# Patient Record
Sex: MALE | Race: WHITE | NOT HISPANIC OR LATINO | Employment: FULL TIME | URBAN - METROPOLITAN AREA
[De-identification: names, ages, dates, MRNs, and addresses within clinical notes are randomized per-mention and may not be internally consistent; named-entity substitution may affect disease eponyms.]

---

## 2018-07-29 ENCOUNTER — OFFICE VISIT (OUTPATIENT)
Dept: URGENT CARE | Facility: CLINIC | Age: 68
End: 2018-07-29
Payer: COMMERCIAL

## 2018-07-29 VITALS
HEIGHT: 66 IN | BODY MASS INDEX: 24.91 KG/M2 | WEIGHT: 155 LBS | HEART RATE: 69 BPM | OXYGEN SATURATION: 98 % | DIASTOLIC BLOOD PRESSURE: 97 MMHG | RESPIRATION RATE: 18 BRPM | TEMPERATURE: 97 F | SYSTOLIC BLOOD PRESSURE: 143 MMHG

## 2018-07-29 DIAGNOSIS — J40 BRONCHITIS: Primary | ICD-10-CM

## 2018-07-29 DIAGNOSIS — Z76.89 ESTABLISHING CARE WITH NEW DOCTOR, ENCOUNTER FOR: ICD-10-CM

## 2018-07-29 DIAGNOSIS — R05.9 COUGH: ICD-10-CM

## 2018-07-29 PROCEDURE — 99203 OFFICE O/P NEW LOW 30 MIN: CPT | Mod: 25,S$GLB,, | Performed by: NURSE PRACTITIONER

## 2018-07-29 PROCEDURE — 96372 THER/PROPH/DIAG INJ SC/IM: CPT | Mod: S$GLB,,, | Performed by: NURSE PRACTITIONER

## 2018-07-29 RX ORDER — PROMETHAZINE HYDROCHLORIDE AND DEXTROMETHORPHAN HYDROBROMIDE 6.25; 15 MG/5ML; MG/5ML
5 SYRUP ORAL NIGHTLY PRN
Qty: 100 ML | Refills: 0 | Status: SHIPPED | OUTPATIENT
Start: 2018-07-29 | End: 2018-08-03

## 2018-07-29 RX ORDER — BENZONATATE 200 MG/1
200 CAPSULE ORAL 3 TIMES DAILY PRN
Qty: 30 CAPSULE | Refills: 0 | Status: SHIPPED | OUTPATIENT
Start: 2018-07-29 | End: 2019-06-12

## 2018-07-29 RX ORDER — FINASTERIDE 1 MG/1
1 TABLET, FILM COATED ORAL DAILY
COMMUNITY
End: 2019-06-12

## 2018-07-29 RX ORDER — AZITHROMYCIN 250 MG/1
TABLET, FILM COATED ORAL
Qty: 6 TABLET | Refills: 0 | Status: SHIPPED | OUTPATIENT
Start: 2018-07-29 | End: 2019-06-12

## 2018-07-29 RX ORDER — ATORVASTATIN CALCIUM 10 MG/1
10 TABLET, FILM COATED ORAL DAILY
COMMUNITY
End: 2019-09-11 | Stop reason: SDUPTHER

## 2018-07-29 RX ORDER — BETAMETHASONE SODIUM PHOSPHATE AND BETAMETHASONE ACETATE 3; 3 MG/ML; MG/ML
6 INJECTION, SUSPENSION INTRA-ARTICULAR; INTRALESIONAL; INTRAMUSCULAR; SOFT TISSUE
Status: COMPLETED | OUTPATIENT
Start: 2018-07-29 | End: 2018-07-29

## 2018-07-29 RX ADMIN — BETAMETHASONE SODIUM PHOSPHATE AND BETAMETHASONE ACETATE 6 MG: 3; 3 INJECTION, SUSPENSION INTRA-ARTICULAR; INTRALESIONAL; INTRAMUSCULAR; SOFT TISSUE at 10:07

## 2018-07-29 NOTE — PATIENT INSTRUCTIONS
What Is Acute Bronchitis?  Acute bronchitis is when the airways in your lungs (bronchial tubes) become red and swollen (inflamed). It is usually caused by a viral infection. But it can also occur because of a bacteria or allergen. Symptoms include a cough that produces yellow or greenish mucus and can last for days or sometimes weeks.  Inside healthy lungs    Air travels in and out of the lungs through the airways. The linings of these airways produce sticky mucus. This mucus traps particles that enter the lungs. Tiny structures called cilia then sweep the particles out of the airways.     Healthy airway: Airways are normally open. Air moves in and out easily.      Healthy cilia: Tiny, hairlike cilia sweep mucus and particles up and out of the airways.   Lungs with bronchitis  Bronchitis often occurs with a cold or the flu virus. The airways become inflamed (red and swollen). There is a deep hacking cough from the extra mucus. Other symptoms may include:  · Wheezing  · Chest discomfort  · Shortness of breath  · Mild fever  A second infection, this time due to bacteria, may then occur. And airways irritated by allergens or smoke are more likely to get infected.        Inflamed airway: Inflammation and extra mucus narrow the airway, causing shortness of breath.      Impaired cilia: Extra mucus impairs cilia, causing congestion and wheezing. Smoking makes the problem worse.   Making a diagnosis  A physical exam, health history, and certain tests help your healthcare provider make the diagnosis.  Health history  Your healthcare provider will ask you about your symptoms.  The exam  Your provider listens to your chest for signs of congestion. He or she may also check your ears, nose, and throat.  Possible tests  · A sputum test for bacteria. This requires a sample of mucus from your lungs.  · A nasal or throat swab. This tests to see if you have a bacterial infection.  · A chest X-ray. This is done if your healthcare  provider thinks you have pneumonia.  · Tests to check for an underlying condition. Other tests may be done to check for things such as allergies, asthma, or COPD (chronic obstructive pulmonary disease). You may need to see a specialist for more lung function testing.  Treating a cough  The main treatment for bronchitis is easing symptoms. Avoiding smoke, allergens, and other things that trigger coughing can often help. If the infection is bacterial, you may be given antibiotics. During the illness, it's important to get plenty of sleep. To ease symptoms:  · Dont smoke. Also avoid secondhand smoke.  · Use a humidifier. Or try breathing in steam from a hot shower. This may help loosen mucus.  · Drink a lot of water and juice. They can soothe the throat and may help thin mucus.  · Sit up or use extra pillows when in bed. This helps to lessen coughing and congestion.  · Ask your provider about using medicine. Ask about using cough medicine, pain and fever medicine, or a decongestant.  Antibiotics  Most cases of bronchitis are caused by cold or flu viruses. They dont need antibiotics to treat them, even if your mucus is thick and green or yellow. Antibiotics dont treat viral illness and antibiotics have not been shown to have any benefit in cases of acute bronchitis. Taking antibiotics when they are not needed increases your risk of getting an infection later that is antibiotic-resistant. Antibiotics can also cause severe cases of diarrhea that require other antibiotics to treat.  It is important that you accept your healthcare provider's opinion to not use antibiotics. Your provider will prescribe antibiotics if the infection is caused by bacteria. If they are prescribed:  · Take all of the medicine. Take the medicine until it is used up, even if symptoms have improved. If you dont, the bronchitis may come back.  · Take the medicines as directed. For instance, some medicines should be taken with food.  · Ask about  side effects. Ask your provider or pharmacist what side effects are common, and what to do about them.  Follow-up care  You should see your provider again in 2 to 3 weeks. By this time, symptoms should have improved. An infection that lasts longer may mean you have a more serious problem.  Prevention  · Avoid tobacco smoke. If you smoke, quit. Stay away from smoky places. Ask friends and family not to smoke around you, or in your home or car.  · Get checked for allergies.  · Ask your provider about getting a yearly flu shot. Also ask about pneumococcal or pneumonia shots.  · Wash your hands often. This helps reduce the chance of picking up viruses that cause colds and flu.  Call your healthcare provider if:  · Symptoms worsen, or you have new symptoms  · Breathing problems worsen or  become severe  · Symptoms dont get better within a week, or within 3 days of taking antibiotics   Date Last Reviewed: 2/1/2017  © 6086-3832 [employer]. [address] [place] [address]. All rights reserved. This information is not intended as a substitute for professional medical care. Always follow your healthcare professional's instructions.    Please arrange follow up with your primary medical clinic as soon as possible. You must understand that you've received an Urgent Care treatment only and that you may be released before all of your medical problems are known or treated. You, the patient, will arrange for follow up as instructed. If your symptoms worsen or fail to improve you should go to the Emergency Room.

## 2018-07-29 NOTE — PROGRESS NOTES
"Subjective:       Patient ID: Sidney Guileln is a 68 y.o. male.    Vitals:  height is 5' 6" (1.676 m) and weight is 70.3 kg (155 lb). His tympanic temperature is 97.4 °F (36.3 °C). His blood pressure is 143/97 (abnormal) and his pulse is 69. His respiration is 18 and oxygen saturation is 98%.     Chief Complaint: Cough    Patient presents with complaint of cough x 2 weeks. Cough is productive. Patient having difficulty sleeping due to cough. Patient has different otc medicines but none have helped. Patient denies chills or fever. Patient communicates that he has had bronchitis in the past, never pneumonia. + SOB with exertion and coughing.       Cough   This is a new problem. The current episode started 1 to 4 weeks ago. The problem has been unchanged. The problem occurs every few minutes. The cough is productive of sputum. Pertinent negatives include no chest pain, chills, ear pain, eye redness, fever, headaches, hemoptysis, myalgias, nasal congestion, postnasal drip, rhinorrhea, sore throat, shortness of breath, sweats or wheezing. The symptoms are aggravated by lying down. He has tried OTC cough suppressant for the symptoms. The treatment provided no relief. His past medical history is significant for bronchitis. There is no history of asthma or pneumonia.     Review of Systems   Constitution: Positive for malaise/fatigue. Negative for chills and fever.   HENT: Positive for congestion and hoarse voice. Negative for ear pain, postnasal drip, rhinorrhea and sore throat.    Eyes: Negative for discharge and redness.   Cardiovascular: Negative for chest pain, dyspnea on exertion and leg swelling.   Respiratory: Positive for cough and sputum production. Negative for hemoptysis, shortness of breath and wheezing.    Musculoskeletal: Negative for myalgias.   Gastrointestinal: Negative for abdominal pain and nausea.   Neurological: Negative for headaches.   All other systems reviewed and are negative.      Objective:    "   Physical Exam   Constitutional: He is oriented to person, place, and time. He appears well-developed and well-nourished. He is cooperative.  Non-toxic appearance. He does not appear ill. No distress.   HENT:   Head: Normocephalic and atraumatic.   Right Ear: Hearing, tympanic membrane, external ear and ear canal normal.   Left Ear: Hearing, tympanic membrane, external ear and ear canal normal.   Nose: Nose normal. No mucosal edema, rhinorrhea or nasal deformity. No epistaxis. Right sinus exhibits no maxillary sinus tenderness and no frontal sinus tenderness. Left sinus exhibits no maxillary sinus tenderness and no frontal sinus tenderness.   Mouth/Throat: Uvula is midline, oropharynx is clear and moist and mucous membranes are normal. No trismus in the jaw. Normal dentition. No uvula swelling. No posterior oropharyngeal erythema.   Eyes: Conjunctivae and lids are normal. No scleral icterus.   Sclera clear bilat   Neck: Trachea normal, full passive range of motion without pain and phonation normal. Neck supple.   Cardiovascular: Normal rate, regular rhythm, normal heart sounds, intact distal pulses and normal pulses.    Pulmonary/Chest: Effort normal. No respiratory distress. He has rhonchi in the right lower field.   Persistent cough present throughout exam   Abdominal: Soft. Normal appearance and bowel sounds are normal. He exhibits no distension. There is no tenderness.   Musculoskeletal: Normal range of motion. He exhibits no edema or deformity.   Neurological: He is alert and oriented to person, place, and time. He exhibits normal muscle tone. Coordination normal.   Skin: Skin is warm, dry and intact. He is not diaphoretic. No pallor.   Psychiatric: He has a normal mood and affect. His speech is normal and behavior is normal. Judgment and thought content normal. Cognition and memory are normal.   Nursing note and vitals reviewed.    CXR IMPRESSION:  Impression       No significant abnormality seen.  Probable  bibasilar nipple shadows.  Apparent gynecomastia.       Assessment:       1. Bronchitis    2. Cough    3. Establishing care with new doctor, encounter for        Plan:         Bronchitis  -     azithromycin (Z-JAMESON) 250 MG tablet; Take 2 tablets by mouth on day 1; Take 1 tablet by mouth on days 2-5  Dispense: 6 tablet; Refill: 0  -     betamethasone acetate-betamethasone sodium phosphate injection 6 mg; Inject 1 mL (6 mg total) into the muscle one time.    Cough  -     X-Ray Chest PA And Lateral; Future; Expected date: 07/29/2018  -     benzonatate (TESSALON) 200 MG capsule; Take 1 capsule (200 mg total) by mouth 3 (three) times daily as needed.  Dispense: 30 capsule; Refill: 0  -     promethazine-dextromethorphan (PROMETHAZINE-DM) 6.25-15 mg/5 mL Syrp; Take 5 mLs by mouth nightly as needed.  Dispense: 100 mL; Refill: 0    Establishing care with new doctor, encounter for  -     Ambulatory referral to Internal Medicine

## 2018-08-01 ENCOUNTER — TELEPHONE (OUTPATIENT)
Dept: URGENT CARE | Facility: CLINIC | Age: 68
End: 2018-08-01

## 2019-06-12 ENCOUNTER — OFFICE VISIT (OUTPATIENT)
Dept: URGENT CARE | Facility: CLINIC | Age: 69
End: 2019-06-12
Payer: COMMERCIAL

## 2019-06-12 VITALS
WEIGHT: 158 LBS | HEIGHT: 66 IN | HEART RATE: 65 BPM | RESPIRATION RATE: 18 BRPM | TEMPERATURE: 97 F | DIASTOLIC BLOOD PRESSURE: 87 MMHG | SYSTOLIC BLOOD PRESSURE: 152 MMHG | OXYGEN SATURATION: 96 % | BODY MASS INDEX: 25.39 KG/M2

## 2019-06-12 DIAGNOSIS — Z76.89 ENCOUNTER TO ESTABLISH CARE: ICD-10-CM

## 2019-06-12 DIAGNOSIS — J98.01 BRONCHOSPASM: Primary | ICD-10-CM

## 2019-06-12 PROCEDURE — 99214 PR OFFICE/OUTPT VISIT, EST, LEVL IV, 30-39 MIN: ICD-10-PCS | Mod: S$GLB,,, | Performed by: FAMILY MEDICINE

## 2019-06-12 PROCEDURE — 99214 OFFICE O/P EST MOD 30 MIN: CPT | Mod: S$GLB,,, | Performed by: FAMILY MEDICINE

## 2019-06-12 PROCEDURE — 94640 PR INHAL RX, AIRWAY OBST/DX SPUTUM INDUCT: ICD-10-PCS | Mod: S$GLB,,, | Performed by: FAMILY MEDICINE

## 2019-06-12 PROCEDURE — 94640 AIRWAY INHALATION TREATMENT: CPT | Mod: S$GLB,,, | Performed by: FAMILY MEDICINE

## 2019-06-12 RX ORDER — CETIRIZINE HYDROCHLORIDE 10 MG/1
10 TABLET ORAL DAILY
COMMUNITY

## 2019-06-12 RX ORDER — IPRATROPIUM BROMIDE 0.5 MG/2.5ML
0.5 SOLUTION RESPIRATORY (INHALATION)
Status: COMPLETED | OUTPATIENT
Start: 2019-06-12 | End: 2019-06-12

## 2019-06-12 RX ORDER — ALBUTEROL SULFATE 0.83 MG/ML
2.5 SOLUTION RESPIRATORY (INHALATION)
Status: COMPLETED | OUTPATIENT
Start: 2019-06-12 | End: 2019-06-12

## 2019-06-12 RX ORDER — BENZONATATE 200 MG/1
200 CAPSULE ORAL 3 TIMES DAILY PRN
Qty: 30 CAPSULE | Refills: 1 | Status: SHIPPED | OUTPATIENT
Start: 2019-06-12 | End: 2019-06-22

## 2019-06-12 RX ORDER — ALBUTEROL SULFATE 90 UG/1
2 AEROSOL, METERED RESPIRATORY (INHALATION) EVERY 4 HOURS PRN
Qty: 18 G | Refills: 1 | Status: SHIPPED | OUTPATIENT
Start: 2019-06-12 | End: 2019-09-11

## 2019-06-12 RX ORDER — PREDNISONE 20 MG/1
40 TABLET ORAL DAILY
Qty: 10 TABLET | Refills: 0 | Status: SHIPPED | OUTPATIENT
Start: 2019-06-12 | End: 2019-06-17

## 2019-06-12 RX ADMIN — ALBUTEROL SULFATE 2.5 MG: 0.83 SOLUTION RESPIRATORY (INHALATION) at 09:06

## 2019-06-12 RX ADMIN — ALBUTEROL SULFATE 2.5 MG: 0.83 SOLUTION RESPIRATORY (INHALATION) at 10:06

## 2019-06-12 RX ADMIN — IPRATROPIUM BROMIDE 0.5 MG: 0.5 SOLUTION RESPIRATORY (INHALATION) at 10:06

## 2019-06-12 RX ADMIN — IPRATROPIUM BROMIDE 0.5 MG: 0.5 SOLUTION RESPIRATORY (INHALATION) at 09:06

## 2019-06-12 NOTE — PATIENT INSTRUCTIONS
Bronchospasm (Adult)    Bronchospasm occurs when the airways (bronchial tubes) go into spasm and contract. This makes it hard to breathe and causes wheezing (a high-pitched whistling sound). Bronchospasm can also cause frequent coughing without wheezing.  Bronchospasm is due to irritation, inflammation, or allergic reaction of the airways. People with asthma get bronchospasm. However, not everyone with bronchospasm has asthma.  Being exposed to harmful fumes, a recent case of bronchitis, exercise, or a flare-up of chronic obstructive pulmonary disease (COPD) may cause the airways to spasm. An episode of bronchospasm may last 7 to 14 days. Medicine may be prescribed to relax the airways and prevent wheezing. Antibiotics will be prescribed only if your healthcare provider thinks there is a bacterial infection. Antibiotics do not help a viral infection.  Home care  · Drink lots of water or other fluids (at least 10 glasses a day) during an attack. This will loosen lung secretions and make it easier to breathe. If you have heart or kidney disease, check with your doctor before you drink extra fluids.  · Take prescribed medicine exactly at the times advised. If you take an inhaled medicine to help with breathing, do not use it more than once every 4 hours, unless told to do so. If prescribed an antibiotic or prednisone, take all of the medicine, even if you are feeling better after a few days.  · Do not smoke. Also avoid being exposed to secondhand smoke.  · If you were given an inhaler, use it exactly as directed. If you need to use it more often than prescribed, your condition may be getting worse. Contact your healthcare provider.  Follow-up care  Follow up with your healthcare provider, or as advised.  Note: If you are age 65 or older, have a chronic lung disease or condition that affects your immune system, or you smoke, we recommend getting pneumococcal vaccinations, as well as an influenza vaccination (flu shot)  every autumn. Ask your healthcare provider about this.  When to seek medical advice  Call your healthcare provider right away if any of these occur:  · You need to use your inhalers more often than usual.  · You develop a fever of 100.4°F (38°C) or higher.  · You are coughing up lots of dark-colored sputum (mucus).  · You do not start to improve within 24 hours.  Call 911, or get immediate medical care  Contact emergency services if any of these occur:  · Coughing up bloody sputum (mucus)  · Chest pain with each breath  · Increased wheezing or shortness of breath  Date Last Reviewed: 9/13/2015 © 2000-2017 [employer]. [address] [place] [address], [place] [address]. All rights reserved. This information is not intended as a substitute for professional medical care. Always follow your healthcare professional's instructions.        Inhaler Use  The inhaler that you were prescribed contains a potent medicine. It should only be used as directed. The medicine in your inhaler must be breathed deeply into your lungs for it to work. It will not work at all if it only reaches your mouth and throat. Follow the instructions below for best results. And remember to follow your asthma action plan as given to you by your doctor.  1. Keep your inhaler at room temperature.  2. Hold the inhaler so that the part that goes into your mouth is at the bottom.  3. Shake the inhaler well and remove the cap.  4. Breathe out through your mouth to fully empty your lungs.  5. Place the inhaler in your mouth and close your lips tightly around it. (Or hold the inhaler 1 to 2 inches from your open mouth if told to do so by your healthcare provider.)  6. Squeeze the inhaler as you breathe in slowly through your mouth until your lungs are full of air, drawing the medicine deep into your lungs.  7. Hold your breath for 10 seconds, or as long as you can comfortably hold your breath. Then breathe out slowly.  8. If you have been advised to  take 2 puffs, wait 5 minutes, then repeat steps 3-7 above. Waiting 5 minutes between puffs will alow the medicine to open up your lungs so the second puff can get deeper into the lungs. Replace the cap when done.  9. If you were prescribed both a steroid inhaler and a bronchodilator inhaler, use the bronchodilator first to open the air passages. Wait 5 minutes, then use the steroid inhaler.  10. Rinse your mouth with water and spit it out (especially after using a steroid inhaler). This is very important if you are using a steroid inhaler to prevent thrush, a mild yeast infection of the mouth and back of the throat.  11. A special chamber (spacer) may be prescribed that attaches to your inhaler. This increases the amount of medicine that goes to your lungs. It also improves how well each treatment works. Ask your doctor about this if you did not receive one.    Keep it clean  Remove the metal canister and do not immerse it in water. Then clean the plastic mouthpiece, cap, and spacer if you have one, by rinsing them well in warm running water for 30 to 60 seconds. Shake off excess water and allow the mouthpiece to dry completely (overnight is recommended). This should be done once a week. If you need the inhaler before the mouthpiece is dry, shake off excess water, replace canister, and test spray 2 times (away from the face).  Warning  A steroid inhaler is used to prevent an asthma attack. Do not use this to treat an acute wheezing episode. Use only bronchodilator inhalers (quick relief) to treat an acute asthma attack.  If you find that your medicine is not working and you need to use it more often than prescribed, this could be a sign that your asthma is getting worse. Go to the emergency room or urgent care right away. An asthma attack is easiest to treat in the early stages before it becomes severe.  When to seek medical advice  Get prompt medical attention if any of the following occur:  · Increased wheezing  or shortness of breath  · Need to use your inhalers more often than usual without relief  · Fever of 100.4°F (38°C) or higher, or as directed by your healthcare provider  · Coughing up lots of dark-colored or bloody sputum (mucus)  · Chest pain with each breath  · Blue lips or fingernails  · Peak flow reading less than 50% of your normal best  Date Last Reviewed: 12/2/2015  © 8632-6054 [employer]. [address] [place] [address], [place] [address]. All rights reserved. This information is not intended as a substitute for professional medical care. Always follow your healthcare professional's instructions.      DO NOT HESITATE TO USE YOUR INHALER EVERY 4 HR AS NEEDED IF YOU ARE COUGHING OR WHEEZING.    Make sure that you follow up with your primary care doctor in the next 2-5 days if needed .  Return to the Urgent Care if signs or symptoms change and certainly if you have worsening symptoms go to the nearest emergency department for further evaluation.

## 2019-06-12 NOTE — PROGRESS NOTES
"Subjective:       Patient ID: Sidney Guillen is a 69 y.o. male.    Vitals:  height is 5' 6" (1.676 m) and weight is 71.7 kg (158 lb). His oral temperature is 97.4 °F (36.3 °C). His blood pressure is 152/87 (abnormal) and his pulse is 65. His respiration is 18 and oxygen saturation is 96%.     Chief Complaint: Cough    69-year-old male with cough for about 1 month.  Remote history of smoking, quit 25 years ago.  Cough is worse at night with some wheezing.  He has required steroid shots in the past. No new medications.  No fever.  No consistently colored sputum    Cough   The current episode started more than 1 month ago. The problem has been unchanged. The problem occurs constantly. The cough is productive of sputum. Associated symptoms include a fever and a sore throat. Pertinent negatives include no chills, ear pain, eye redness, hemoptysis, myalgias, rash, shortness of breath or wheezing. The symptoms are aggravated by lying down. He has tried OTC cough suppressant for the symptoms. The treatment provided no relief. His past medical history is significant for environmental allergies.       Constitution: Positive for fever. Negative for chills, sweating and fatigue.   HENT: Positive for congestion, sore throat, trouble swallowing and voice change. Negative for ear pain, sinus pain and sinus pressure.    Neck: Negative for painful lymph nodes.   Eyes: Negative for eye redness.   Respiratory: Positive for cough and sputum production. Negative for chest tightness, bloody sputum, COPD, shortness of breath, stridor, wheezing and asthma.    Gastrointestinal: Negative for nausea and vomiting.   Musculoskeletal: Negative for muscle ache.   Skin: Negative for rash.   Allergic/Immunologic: Positive for environmental allergies and seasonal allergies. Negative for asthma.   Hematologic/Lymphatic: Negative for swollen lymph nodes.       Objective:      Physical Exam   Constitutional: He is oriented to person, place, and time. " He appears well-developed and well-nourished. He is cooperative.  Non-toxic appearance. He does not appear ill. No distress.   HENT:   Head: Normocephalic and atraumatic.   Right Ear: Hearing, tympanic membrane, external ear and ear canal normal.   Left Ear: Hearing, tympanic membrane, external ear and ear canal normal.   Nose: Nose normal. No mucosal edema, rhinorrhea or nasal deformity. No epistaxis. Right sinus exhibits no maxillary sinus tenderness and no frontal sinus tenderness. Left sinus exhibits no maxillary sinus tenderness and no frontal sinus tenderness.   Mouth/Throat: Uvula is midline, oropharynx is clear and moist and mucous membranes are normal. No trismus in the jaw. Normal dentition. No uvula swelling. No oropharyngeal exudate or posterior oropharyngeal erythema.   Eyes: Conjunctivae and lids are normal. No scleral icterus.   Sclera clear bilat   Neck: Trachea normal, normal range of motion, full passive range of motion without pain and phonation normal. Neck supple.   Cardiovascular: Normal rate, regular rhythm, normal heart sounds, intact distal pulses and normal pulses.   No murmur heard.  Pulmonary/Chest: Effort normal. No stridor. No respiratory distress. He has wheezes. He has no rales.   End expiratory wheezing that improved after initial nebulizer treatment.  However he reported feeling about the same.  Nebulizer treatment was repeated.  Clear to auscultation bilaterally post treatment.  No rales.   Abdominal: Soft. Normal appearance and bowel sounds are normal. He exhibits no distension and no mass. There is no tenderness. There is no rebound and no guarding.   Musculoskeletal: Normal range of motion. He exhibits no edema or deformity.   Lymphadenopathy:     He has no cervical adenopathy.   Neurological: He is alert and oriented to person, place, and time. He exhibits normal muscle tone. Coordination normal.   Skin: Skin is warm, dry and intact. He is not diaphoretic. No pallor.    Psychiatric: He has a normal mood and affect. His speech is normal and behavior is normal. Judgment and thought content normal. Cognition and memory are normal.   Nursing note and vitals reviewed.      Assessment:       1. Bronchospasm        Plan:         Bronchospasm  -     albuterol nebulizer solution 2.5 mg  -     ipratropium 0.02 % nebulizer solution 0.5 mg    DO NOT HESITATE TO USE YOUR INHALER EVERY 4 HR AS NEEDED IF YOU ARE COUGHING OR WHEEZING.    Make sure that you follow up with your primary care doctor in the next 2-5 days if needed .  Return to the Urgent Care if signs or symptoms change and certainly if you have worsening symptoms go to the nearest emergency department for further evaluation.

## 2019-09-11 ENCOUNTER — HOSPITAL ENCOUNTER (OUTPATIENT)
Dept: RADIOLOGY | Facility: OTHER | Age: 69
Discharge: HOME | End: 2019-09-11
Attending: FAMILY MEDICINE
Payer: COMMERCIAL

## 2019-09-11 ENCOUNTER — OFFICE VISIT (OUTPATIENT)
Dept: INTERNAL MEDICINE | Facility: CLINIC | Age: 69
End: 2019-09-11
Payer: COMMERCIAL

## 2019-09-11 VITALS
SYSTOLIC BLOOD PRESSURE: 141 MMHG | HEIGHT: 66 IN | BODY MASS INDEX: 26.01 KG/M2 | OXYGEN SATURATION: 97 % | WEIGHT: 161.81 LBS | HEART RATE: 64 BPM | DIASTOLIC BLOOD PRESSURE: 84 MMHG

## 2019-09-11 DIAGNOSIS — I10 BENIGN HYPERTENSION: ICD-10-CM

## 2019-09-11 DIAGNOSIS — Z00.00 ANNUAL PHYSICAL EXAM: ICD-10-CM

## 2019-09-11 DIAGNOSIS — E78.2 MIXED HYPERLIPIDEMIA: ICD-10-CM

## 2019-09-11 DIAGNOSIS — Z11.59 ENCOUNTER FOR HEPATITIS C SCREENING TEST FOR LOW RISK PATIENT: ICD-10-CM

## 2019-09-11 DIAGNOSIS — E55.9 VITAMIN D DEFICIENCY: ICD-10-CM

## 2019-09-11 DIAGNOSIS — Z23 NEED FOR PNEUMOCOCCAL VACCINATION: ICD-10-CM

## 2019-09-11 DIAGNOSIS — Z13.6 ENCOUNTER FOR ABDOMINAL AORTIC ANEURYSM (AAA) SCREENING: ICD-10-CM

## 2019-09-11 DIAGNOSIS — Z00.00 ANNUAL PHYSICAL EXAM: Primary | ICD-10-CM

## 2019-09-11 DIAGNOSIS — I10 BENIGN HYPERTENSION: Primary | ICD-10-CM

## 2019-09-11 LAB — 25(OH)D3+25(OH)D2 SERPL-MCNC: 40 NG/ML (ref 30–96)

## 2019-09-11 PROCEDURE — 99214 OFFICE O/P EST MOD 30 MIN: CPT | Mod: PBBFAC,PN | Performed by: FAMILY MEDICINE

## 2019-09-11 PROCEDURE — 99999 PR PBB SHADOW E&M-EST. PATIENT-LVL IV: ICD-10-PCS | Mod: PBBFAC,,, | Performed by: FAMILY MEDICINE

## 2019-09-11 PROCEDURE — 90686 IIV4 VACC NO PRSV 0.5 ML IM: CPT | Mod: PBBFAC,PN

## 2019-09-11 PROCEDURE — 76706 US AAA SCREENING: ICD-10-PCS | Mod: 26,,, | Performed by: RADIOLOGY

## 2019-09-11 PROCEDURE — 99397 PER PM REEVAL EST PAT 65+ YR: CPT | Mod: S$PBB,,, | Performed by: FAMILY MEDICINE

## 2019-09-11 PROCEDURE — G0009 ADMIN PNEUMOCOCCAL VACCINE: HCPCS | Mod: PBBFAC,PN

## 2019-09-11 PROCEDURE — 82306 VITAMIN D 25 HYDROXY: CPT

## 2019-09-11 PROCEDURE — 76706 US ABDL AORTA SCREEN AAA: CPT | Mod: TC

## 2019-09-11 PROCEDURE — 99999 PR PBB SHADOW E&M-EST. PATIENT-LVL IV: CPT | Mod: PBBFAC,,, | Performed by: FAMILY MEDICINE

## 2019-09-11 PROCEDURE — 86803 HEPATITIS C AB TEST: CPT

## 2019-09-11 PROCEDURE — 76706 US ABDL AORTA SCREEN AAA: CPT | Mod: 26,,, | Performed by: RADIOLOGY

## 2019-09-11 PROCEDURE — 99397 PR PREVENTIVE VISIT,EST,65 & OVER: ICD-10-PCS | Mod: S$PBB,,, | Performed by: FAMILY MEDICINE

## 2019-09-11 RX ORDER — AMLODIPINE BESYLATE 10 MG/1
10 TABLET ORAL DAILY
Qty: 30 TABLET | Refills: 1 | Status: SHIPPED | OUTPATIENT
Start: 2019-09-11 | End: 2019-10-04 | Stop reason: SDUPTHER

## 2019-09-11 RX ORDER — ACETAMINOPHEN 500 MG
TABLET ORAL
COMMUNITY

## 2019-09-11 RX ORDER — AMLODIPINE BESYLATE 5 MG/1
5 TABLET ORAL DAILY
Qty: 90 TABLET | Refills: 3 | Status: SHIPPED | OUTPATIENT
Start: 2019-09-11 | End: 2019-09-11

## 2019-09-11 RX ORDER — ATORVASTATIN CALCIUM 10 MG/1
10 TABLET, FILM COATED ORAL DAILY
Qty: 90 TABLET | Refills: 3 | Status: SHIPPED | OUTPATIENT
Start: 2019-09-11 | End: 2020-06-11 | Stop reason: SDUPTHER

## 2019-09-11 NOTE — TELEPHONE ENCOUNTER
I see that this patient's repeat systolic blood pressure was still >140.  Can you please call the patient to offer the following options:    1. We can increase the dose of his amlodipine to bring his pressures more comfortably below the goal of 140/90    2. Or to recommend a follow-up visit in 2-4 weeks to repeat his reading, discuss home blood pressures, and possible treatment options.    Thanks!

## 2019-09-11 NOTE — PROGRESS NOTES
After obtaining consent, and per orders of Dr. Batres, injection of fluarix Lot [id number] 6/30/20 given in the RD by SOFI ALATORRE. Patient tolerated well and band aid applied. Patient instructed to remain in clinic for 15 minutes afterwards, and to report any adverse reaction to me immediately.    After obtaining consent, and per orders of Dr. Batres, injection of pneumonia 13 Lot [id number] 9/20 given in the LD by SOFI ALATORRE. Patient tolerated well and band aid applied. Patient instructed to remain in clinic for 15 minutes afterwards, and to report any adverse reaction to me immediately.

## 2019-09-11 NOTE — TELEPHONE ENCOUNTER
Sounds good, new Rx sent, can you please call his [facility] to cancel the initial Rx for amlodipine 5 mg and clarify his dosing, thank you!

## 2019-09-11 NOTE — TELEPHONE ENCOUNTER
Spoke with sofy at [facility] and informed her that Dr Batres sent in the new script for Amlodipine 10mg today and she cancelled out the Amlodipine 5mg and pt is to be taking it once daily. Pt was informed as well.

## 2019-09-11 NOTE — TELEPHONE ENCOUNTER
Pt states that she would like to try a higher dose of the amlodipine and come back to see Dr. Batres on 10/10/19 to make sure that the medication is working.

## 2019-09-11 NOTE — PATIENT INSTRUCTIONS
Prevention Guidelines, Men Ages 65 and Older  Screening tests and vaccines are an important part of managing your health. Health counseling is essential, too. Below are guidelines for these, for men ages 65 and older. Talk with your healthcare provider to make sure youre up-to-date on what you need.  Screening Who needs it How often   Abdominal aortic aneurysm Men ages 65 to 75 who have ever smoked 1 ultrasound   Alcohol misuse All men in this age group At routine exams   Blood pressure All men in this age group Every 2 years if your blood pressure is less than 120/80 mm Hg; yearly if your systolic blood pressure is 120 to 139 mm Hg, or your diastolic blood pressure reading is 80 to 89 mm Hg   Colorectal cancer All men in this age group Flexible sigmoidoscopy every 5 years, or colonoscopy every 10 years, or double-contrast barium enema every 5 years; yearly fecal occult blood test or fecal immunochemical test; or a stool DNA test as often as your healthcare provider advises; talk with your healthcare provider about which tests are best for you and when you no longer need colonoscopies (generally after age 75)   Depression All men in this age group At routine exams   Type 2 diabetes or prediabetes All adults beginning at age 45 and adults without symptoms at any age who are overweight or obese and have 1 or more other risk factors for diabetes At least every 3 years (yearly if your blood sugar has already begun to rise)   Hepatitis C Men at increased risk for infection - talk with your healthcare provider At routine exams   High cholesterol or triglycerides All men in this age group At least every 5 years   HIV Men at increased risk for infection - talk with your healthcare provider At routine exams   Lung cancer Adults ages 55 to 80 who have smoked Yearly screening in smokers with 30 pack-year history of smoking or who quit within 15 years   Obesity All men in this age group At routine exams   Prostate cancer All  men in this age group, talk to healthcare provider about risks and benefits of digital rectal exam (LASHAWN) and prostate-specific antigen (PSA) screening1 At routine exams   Syphilis Men at increased risk for infection - talk with your healthcare provider At routine exams   Tuberculosis Men at increased risk for infection - talk with your healthcare provider Ask your healthcare provider   Vision All men in this age group Every 1 to 2 years; if you have a chronic health condition, ask your healthcare provider if you needs exams more often   Vaccine Who needs it How often   Chickenpox (varicella) All men in this age group who have no record of this infection or vaccine 2 doses; second dose should be given at least 4 weeks after the first dose   Hepatitis A Men at increased risk for infection - talk with your healthcare provider 2 doses given at least 6 months apart   Hepatitis B Men at increased risk for infection - talk with your healthcare provider 3 doses over 6 months; second dose should be given 1 month after the first dose; the third dose should be given at least 2 months after the second dose and at least 4 months after the first dose   Haemophilus influenzae Type B (HIB) Men at increased risk for infection - talk with your healthcare provider 1 to 3 doses   Influenza (flu) All men in this age group  Once a year   Meningococcal Men at increased risk for infection - talk with your healthcare provider 1 or more doses   Pneumococcal conjugate vaccine (PCV13) and pneumococcal polysaccharide vaccine (PPSV23) All men in this age group 1 dose of each vaccine   Tetanus/diphtheria/  pertussis (Td/Tdap) booster All men in this age group Td every 10 years, or Tdap if you will have contact with a child younger than 12 months old   Zoster All men in this age group 1 dose   Counseling Who needs it How often   Diet and exercise Men who are overweight or obese When diagnosed, and then at routine exams   Fall prevention (exercise,  vitamin D supplements) All men in this age group At routine exams   Sexually transmitted infection Men at increased risk for infection - talk with your healthcare provider At routine exams   Use of daily aspirin Men ages 45 to 79 at risk for cardiovascular health problems At routine exams   Use of tobacco and the health effects it can cause All men in this age group Every visit   [address] [place] [address] [facility]   Date Last Reviewed: 2/1/2017  © 7354-1015 [employer]. [address] [place] [address], [place] [address]. All rights reserved. This information is not intended as a substitute for professional medical care. Always follow your healthcare professional's instructions.

## 2019-09-12 ENCOUNTER — TELEPHONE (OUTPATIENT)
Dept: INTERNAL MEDICINE | Facility: CLINIC | Age: 69
End: 2019-09-12

## 2019-09-12 LAB — HCV AB SERPL QL IA: NEGATIVE

## 2019-09-12 NOTE — TELEPHONE ENCOUNTER
"docplannerhart message sent.  Please let the patient know their results, thank you:    " Hi Mr. Guillen,    I have reviewed your recent results.  Your labs are looking good.  The screening for hepatitis is negative, and your vitamin D level is now safely in the normal range.  It would be safe to continue the current dose of vitamin D supplement, but if you prefer to reduce the dosing I would recommend still taking 1683-4354 units per day.  The ultrasound to screen for "AAA" (abdominal aortic aneurysm) was not quite normal, but also not worrisome.  The top of the aorta is measuring at 3.6-3.7 cm, which is slightly above the cutoff of normal at 3.5 cm.  For perspective, we typically don't worry about this measurement unless it is above 4.5-5.5 cm.  At this point, no further workup would be needed, but we could consider repeating this ultrasound in another 1-2 years to make sure everything is stable.  Please contact the office if you have any additional questions or concerns.    Daniel Batres MD      "

## 2019-10-04 DIAGNOSIS — I10 BENIGN HYPERTENSION: ICD-10-CM

## 2019-10-04 RX ORDER — AMLODIPINE BESYLATE 10 MG/1
TABLET ORAL
Qty: 90 TABLET | Refills: 3 | Status: SHIPPED | OUTPATIENT
Start: 2019-10-04 | End: 2020-03-11 | Stop reason: SDUPTHER

## 2019-10-14 ENCOUNTER — OFFICE VISIT (OUTPATIENT)
Dept: INTERNAL MEDICINE | Facility: CLINIC | Age: 69
End: 2019-10-14
Payer: MEDICARE

## 2019-10-14 VITALS
TEMPERATURE: 98 F | HEART RATE: 67 BPM | WEIGHT: 165.25 LBS | SYSTOLIC BLOOD PRESSURE: 142 MMHG | HEIGHT: 66 IN | BODY MASS INDEX: 26.56 KG/M2 | OXYGEN SATURATION: 98 % | DIASTOLIC BLOOD PRESSURE: 80 MMHG

## 2019-10-14 DIAGNOSIS — J30.9 CHRONIC ALLERGIC RHINITIS: ICD-10-CM

## 2019-10-14 DIAGNOSIS — I10 BENIGN HYPERTENSION: Primary | ICD-10-CM

## 2019-10-14 PROCEDURE — 99999 PR PBB SHADOW E&M-EST. PATIENT-LVL IV: CPT | Mod: PBBFAC,,, | Performed by: FAMILY MEDICINE

## 2019-10-14 PROCEDURE — 99999 PR PBB SHADOW E&M-EST. PATIENT-LVL IV: ICD-10-PCS | Mod: PBBFAC,,, | Performed by: FAMILY MEDICINE

## 2019-10-14 PROCEDURE — 99214 OFFICE O/P EST MOD 30 MIN: CPT | Mod: PBBFAC,PN | Performed by: FAMILY MEDICINE

## 2019-10-14 RX ORDER — FLUTICASONE PROPIONATE 50 MCG
2 SPRAY, SUSPENSION (ML) NASAL DAILY
Qty: 16 G | Refills: 2 | Status: SHIPPED | OUTPATIENT
Start: 2019-10-14 | End: 2019-12-30 | Stop reason: SDUPTHER

## 2019-10-14 RX ORDER — LOSARTAN POTASSIUM 25 MG/1
25 TABLET ORAL DAILY
Qty: 30 TABLET | Refills: 1 | Status: SHIPPED | OUTPATIENT
Start: 2019-10-14 | End: 2019-11-12 | Stop reason: SDUPTHER

## 2019-10-14 NOTE — PATIENT INSTRUCTIONS
Losartan tablets  What is this medicine?  LOSARTAN (efren NOELLE tan) is used to treat high blood pressure and to reduce the risk of stroke in certain patients. This drug also slows the progression of kidney disease in patients with diabetes.  How should I use this medicine?  Take this medicine by mouth with a glass of water. Follow the directions on the prescription label. This medicine can be taken with or without food. Take your doses at regular intervals. Do not take your medicine more often than directed.  Talk to your pediatrician regarding the use of this medicine in children. Special care may be needed.  What side effects may I notice from receiving this medicine?  Side effects that you should report to your doctor or health care professional as soon as possible:  · confusion, dizziness, light headedness or fainting spells  · decreased amount of urine passed  · difficulty breathing or swallowing, hoarseness, or tightening of the throat  · fast or irregular heart beat, palpitations, or chest pain  · skin rash, itching  · swelling of your face, lips, tongue, hands, or feet  Side effects that usually do not require medical attention (report to your doctor or health care professional if they continue or are bothersome):  · cough  · decreased sexual function or desire  · headache  · nasal congestion or stuffiness  · nausea or stomach pain  · sore or cramping muscles  What may interact with this medicine?  · blood pressure medicines  · diuretics, especially triamterene, spironolactone, or amiloride  · fluconazole  · NSAIDs, medicines for pain and inflammation, like ibuprofen or naproxen  · potassium salts or potassium supplements  · rifampin  What if I miss a dose?  If you miss a dose, take it as soon as you can. If it is almost time for your next dose, take only that dose. Do not take double or extra doses.  Where should I keep my medicine?  Keep out of the reach of children.  Store at room temperature between 15  and 30 degrees C (59 and 86 degrees F). Protect from light. Keep container tightly closed. Throw away any unused medicine after the expiration date.  What should I tell my health care provider before I take this medicine?  They need to know if you have any of these conditions:  · heart failure  · kidney or liver disease  · an unusual or allergic reaction to losartan, other medicines, foods, dyes, or preservatives  · pregnant or trying to get pregnant  · breast-feeding  What should I watch for while using this medicine?  Visit your doctor or health care professional for regular checks on your progress. Check your blood pressure as directed. Ask your doctor or health care professional what your blood pressure should be and when you should contact him or her. Call your doctor or health care professional if you notice an irregular or fast heart beat.  Women should inform their doctor if they wish to become pregnant or think they might be pregnant. There is a potential for serious side effects to an unborn child, particularly in the second or third trimester. Talk to your health care professional or pharmacist for more information.  You may get drowsy or dizzy. Do not drive, use machinery, or do anything that needs mental alertness until you know how this drug affects you. Do not stand or sit up quickly, especially if you are an older patient. This reduces the risk of dizzy or fainting spells. Alcohol can make you more drowsy and dizzy. Avoid alcoholic drinks.  Avoid salt substitutes unless you are told otherwise by your doctor or health care professional.  Do not treat yourself for coughs, colds, or pain while you are taking this medicine without asking your doctor or health care professional for advice. Some ingredients may increase your blood pressure.  NOTE:This sheet is a summary. It may not cover all possible information. If you have questions about this medicine, talk to your doctor, pharmacist, or health care  provider. Copyright© 2017 [employer]

## 2019-10-14 NOTE — PROGRESS NOTES
"[facility]  [facility] - Family Medicine/ Internal Medicine  Clinic Note      Subjective:       Patient ID: Sidney Guillen is a 69 y.o. male.    Chief Complaint: blood pressure follow up    Hypertension   This is a chronic problem. The problem has been gradually worsening since onset. The problem is uncontrolled (mildly elevated blood pressure at last visit and today). Pertinent negatives include no anxiety, chest pain, headaches, malaise/fatigue, palpitations, peripheral edema or shortness of breath. There are no associated agents to hypertension. There are no known risk factors for coronary artery disease. Past treatments include angiotensin blockers. The current treatment provides significant improvement. There are no compliance problems.  There is no history of kidney disease, CAD/MI or CVA.     Review of Systems   Constitutional: Negative for fatigue, fever and malaise/fatigue.   Respiratory: Negative for cough and shortness of breath.    Cardiovascular: Negative for chest pain and palpitations.   Gastrointestinal: Negative for diarrhea and vomiting.   Neurological: Negative for dizziness, light-headedness and headaches.   Psychiatric/Behavioral: Negative for dysphoric mood and sleep disturbance. The patient is not nervous/anxious.        Objective:      BP (!) 142/80 (BP Location: Right arm, Patient Position: Sitting, BP Method: Medium (Manual))   Pulse 67   Temp 98.1 °F (36.7 °C)   Ht 5' 6" (1.676 m)   Wt 75 kg (165 lb 3.8 oz)   SpO2 98%   BMI 26.67 kg/m²   Physical Exam   Constitutional: He is oriented to person, place, and time. He appears well-developed and well-nourished. No distress.   Neck: Normal range of motion.   Cardiovascular: Normal rate and regular rhythm.   No murmur heard.  Pulmonary/Chest: Effort normal and breath sounds normal. He has no wheezes. He has no rales.   Abdominal: Soft. Bowel sounds are normal. He exhibits no distension. There is no tenderness. "   Musculoskeletal: Normal range of motion.   Neurological: He is alert and oriented to person, place, and time.   Skin: Skin is warm and dry. No rash noted.   Psychiatric: He has a normal mood and affect.   Vitals reviewed.      Assessment:       1. Benign hypertension    2. Chronic allergic rhinitis        Plan:     1. Benign hypertension  - chronic condition, is not well-controlled, with slightly elevated pressures here, will increase current regimen by adding ARB to CCB, dosing instructions and potential side effects reviewed, handout provided   - - losartan (COZAAR) 25 MG tablet; Take 1 tablet (25 mg total) by mouth once daily.  Dispense: 30 tablet; Refill: 1  - diet and exercise lifestyle modifications reviewed and recommended     2. Chronic allergic rhinitis  - fluticasone propionate (FLONASE) 50 mcg/actuation nasal spray; 2 sprays (100 mcg total) by Each Nostril route once daily.  Dispense: 16 g; Refill: 2     - Follow up in about 4 weeks (around 11/11/2019) for follow-up blood pressure.     Daniel Batres MD  10/14/2019          Medication List with Changes/Refills   New Medications    FLUTICASONE PROPIONATE (FLONASE) 50 MCG/ACTUATION NASAL SPRAY    2 sprays (100 mcg total) by Each Nostril route once daily.    LOSARTAN (COZAAR) 25 MG TABLET    Take 1 tablet (25 mg total) by mouth once daily.   Current Medications    AMLODIPINE (NORVASC) 10 MG TABLET    TAKE 1 TABLET BY MOUTH EVERY DAY    ATORVASTATIN (LIPITOR) 10 MG TABLET    Take 1 tablet (10 mg total) by mouth once daily.    CETIRIZINE (ZYRTEC) 10 MG TABLET    Take 10 mg by mouth once daily.    CHOLECALCIFEROL, VITAMIN D3, (VITAMIN D3) 2,000 UNIT CAP        DICLOFENAC POTASSIUM ORAL    Take 75 mg by mouth as needed.

## 2019-11-12 ENCOUNTER — OFFICE VISIT (OUTPATIENT)
Dept: INTERNAL MEDICINE | Facility: CLINIC | Age: 69
End: 2019-11-12
Payer: MEDICARE

## 2019-11-12 VITALS
SYSTOLIC BLOOD PRESSURE: 132 MMHG | WEIGHT: 166.81 LBS | DIASTOLIC BLOOD PRESSURE: 70 MMHG | HEIGHT: 66 IN | HEART RATE: 59 BPM | OXYGEN SATURATION: 95 % | BODY MASS INDEX: 26.81 KG/M2 | TEMPERATURE: 97 F

## 2019-11-12 DIAGNOSIS — I10 BENIGN HYPERTENSION: ICD-10-CM

## 2019-11-12 PROCEDURE — 99214 OFFICE O/P EST MOD 30 MIN: CPT | Mod: PBBFAC,PN | Performed by: FAMILY MEDICINE

## 2019-11-12 PROCEDURE — 99999 PR PBB SHADOW E&M-EST. PATIENT-LVL IV: CPT | Mod: PBBFAC,,, | Performed by: FAMILY MEDICINE

## 2019-11-12 PROCEDURE — 99999 PR PBB SHADOW E&M-EST. PATIENT-LVL IV: ICD-10-PCS | Mod: PBBFAC,,, | Performed by: FAMILY MEDICINE

## 2019-11-12 RX ORDER — LOSARTAN POTASSIUM 25 MG/1
25 TABLET ORAL DAILY
Qty: 90 TABLET | Refills: 3 | Status: SHIPPED | OUTPATIENT
Start: 2019-11-12 | End: 2019-12-02 | Stop reason: SDUPTHER

## 2019-11-12 NOTE — PATIENT INSTRUCTIONS
Discharge Instructions for Low Blood Pressure (Hypotension)  You have been diagnosed with hypotension. When you have hypotension, your blood pressure is lower than normal. Low blood pressure can make you feel dizzy or faint. This condition is sometimes a side effect of taking certain medicines, including medicines for high blood pressure (hypertension). It can also result from medical conditions such as dehydration.  Home care  These home care steps can help manage your condition:  · Follow your doctors instructions.  · Rest in bed and ask for help with daily activities until you feel better. You may need to slowly increase the amount of time you spend sitting or doing light activity.  · Dont drive while your blood pressure is not controlled.  · Be careful when you get up from sitting or lying down.  ¨ Take your time. Sudden movements can cause dizziness or fainting.  ¨ When you first sit up after lying down, be sure to sit in bed for 30 seconds or so before getting up to walk.  · Tell your doctor about the medicines you are taking. Many kinds of medicines trigger low blood pressure.  · Limit your alcohol intake to no more than 2 drinks a day for men and 1 drink a day for women. Alcohol can dehydrate you even further. It can also interfere with the effectiveness of medicines.  · Prevent dehydration by drinking plenty of fluids, unless otherwise instructed by your doctor  · Learn to take your own blood pressure. Keep a record of your results. Ask your doctor which readings mean that you need medical attention.  · Tell your family members to call an ambulance if you become unconscious. Request that they learn CPR.  Follow-up care  Make a follow-up appointment as directed by our staff.     When to seek medical care  Call your doctor immediately if you have any of the following:  · Chest pain or shortness of breath (call 911)  · Dizziness or fainting spells  · Black, maroon, or tarry stools  · Irregular  heartbeat  · Stiff neck  · Severe upper back pain  · Diarrhea or vomiting that doesnt go away  · Inability to eat or drink  · Burning sensation when you urinate  · Urine with a strong, unpleasant odor  · Fainting with exercise   Date Last Reviewed: 4/27/2016  © 5832-6749 "[employer]. [address] [place] [address], [place] [address]. All rights reserved. This information is not intended as a substitute for professional medical care. Always follow your healthcare professional's instructions.

## 2019-11-12 NOTE — PROGRESS NOTES
"[facility]  [facility] - Family Medicine/ Internal Medicine  Clinic Note      Subjective:       Patient ID: Sidney Guillen is a 69 y.o. male.    Chief Complaint: Benign Hypertension    Hypertension   This is a chronic problem. The problem has been rapidly improving since onset. The problem is controlled. Pertinent negatives include no anxiety, chest pain, headaches, malaise/fatigue, palpitations, peripheral edema or shortness of breath. There are no associated agents to hypertension. There are no known risk factors for coronary artery disease. Past treatments include angiotensin blockers and calcium channel blockers. The current treatment provides significant improvement. There are no compliance problems.  There is no history of kidney disease, CAD/MI or CVA.     Review of Systems   Constitutional: Negative for fatigue, fever and malaise/fatigue.   Respiratory: Negative for cough and shortness of breath.    Cardiovascular: Negative for chest pain and palpitations.   Gastrointestinal: Negative for diarrhea and vomiting.   Neurological: Negative for dizziness, light-headedness and headaches.   Psychiatric/Behavioral: Negative for dysphoric mood and sleep disturbance. The patient is not nervous/anxious.        Objective:      /70 (BP Location: Left arm, Patient Position: Sitting, BP Method: Medium (Manual))   Pulse (!) 59   Temp 97.4 °F (36.3 °C) (Oral)   Ht 5' 6" (1.676 m)   Wt 75.7 kg (166 lb 12.8 oz)   SpO2 95%   BMI 26.92 kg/m²   Physical Exam   Constitutional: He is oriented to person, place, and time. He appears well-developed and well-nourished. No distress.   Neck: Normal range of motion.   Cardiovascular: Normal rate and regular rhythm.   No murmur heard.  Pulmonary/Chest: Effort normal and breath sounds normal. He has no wheezes. He has no rales.   Abdominal: Soft. Bowel sounds are normal. He exhibits no distension. There is no tenderness.   Musculoskeletal: Normal range of motion. "   Neurological: He is alert and oriented to person, place, and time.   Skin: Skin is warm and dry. No rash noted.   Psychiatric: He has a normal mood and affect.   Vitals reviewed.      Assessment:       1. Benign hypertension        Plan:     1. Benign hypertension  - chronic condition, now well-controlled with amlodipine and low-dose ARB, will continue current regimen and refill provided   - - losartan (COZAAR) 25 MG tablet; Take 1 tablet (25 mg total) by mouth once daily.  Dispense: 90 tablet; Refill: 3  - diet and exercise lifestyle modifications reviewed and recommended  - goal BP reviewed, have encouraged patient to follow blood pressures at home intermittently and call the office for any concerns  - questions answered, warning signs reviewed, patient is comfortable with this plan and was encouraged to call the office for any concerns or worsening of condition     - Follow up in about 3 months (around 2/12/2020) for follow-up BP.     Daniel Batres MD  11/12/2019          Medication List with Changes/Refills   Current Medications    AMLODIPINE (NORVASC) 10 MG TABLET    TAKE 1 TABLET BY MOUTH EVERY DAY    ATORVASTATIN (LIPITOR) 10 MG TABLET    Take 1 tablet (10 mg total) by mouth once daily.    CETIRIZINE (ZYRTEC) 10 MG TABLET    Take 10 mg by mouth once daily.    CHOLECALCIFEROL, VITAMIN D3, (VITAMIN D3) 2,000 UNIT CAP        DICLOFENAC POTASSIUM ORAL    Take 75 mg by mouth as needed.     FLUTICASONE PROPIONATE (FLONASE) 50 MCG/ACTUATION NASAL SPRAY    2 sprays (100 mcg total) by Each Nostril route once daily.   Changed and/or Refilled Medications    Modified Medication Previous Medication    LOSARTAN (COZAAR) 25 MG TABLET losartan (COZAAR) 25 MG tablet       Take 1 tablet (25 mg total) by mouth once daily.    Take 1 tablet (25 mg total) by mouth once daily.

## 2019-12-02 DIAGNOSIS — I10 BENIGN HYPERTENSION: ICD-10-CM

## 2019-12-02 RX ORDER — LOSARTAN POTASSIUM 25 MG/1
25 TABLET ORAL DAILY
Qty: 90 TABLET | Refills: 3 | Status: SHIPPED | OUTPATIENT
Start: 2019-12-02 | End: 2020-03-11 | Stop reason: SDUPTHER

## 2019-12-30 DIAGNOSIS — J30.9 CHRONIC ALLERGIC RHINITIS: ICD-10-CM

## 2019-12-30 RX ORDER — FLUTICASONE PROPIONATE 50 MCG
2 SPRAY, SUSPENSION (ML) NASAL DAILY
Qty: 16 G | Refills: 2 | Status: SHIPPED | OUTPATIENT
Start: 2019-12-30 | End: 2020-01-06

## 2020-01-06 DIAGNOSIS — J30.9 CHRONIC ALLERGIC RHINITIS: ICD-10-CM

## 2020-01-06 RX ORDER — FLUTICASONE PROPIONATE 50 MCG
2 SPRAY, SUSPENSION (ML) NASAL DAILY
Qty: 48 ML | Refills: 2 | Status: SHIPPED | OUTPATIENT
Start: 2020-01-06 | End: 2020-03-17 | Stop reason: SDUPTHER

## 2020-01-22 ENCOUNTER — PATIENT MESSAGE (OUTPATIENT)
Dept: INTERNAL MEDICINE | Facility: CLINIC | Age: 70
End: 2020-01-22

## 2020-01-22 NOTE — TELEPHONE ENCOUNTER
Please recommend for the patient to make an appointment in clinic to discuss what's been going on and what further testing might be indicated.  We can arrange for testing and/or specialty referral at that time, and discuss potential treatment options as well.  Thanks.

## 2020-01-27 ENCOUNTER — OFFICE VISIT (OUTPATIENT)
Dept: INTERNAL MEDICINE | Facility: CLINIC | Age: 70
End: 2020-01-27
Payer: COMMERCIAL

## 2020-01-27 VITALS
HEART RATE: 69 BPM | HEIGHT: 66 IN | DIASTOLIC BLOOD PRESSURE: 80 MMHG | OXYGEN SATURATION: 96 % | WEIGHT: 164.44 LBS | SYSTOLIC BLOOD PRESSURE: 128 MMHG | BODY MASS INDEX: 26.43 KG/M2

## 2020-01-27 DIAGNOSIS — M25.562 CHRONIC PAIN OF BOTH KNEES: Primary | ICD-10-CM

## 2020-01-27 DIAGNOSIS — G89.29 CHRONIC PAIN OF BOTH KNEES: Primary | ICD-10-CM

## 2020-01-27 DIAGNOSIS — M25.561 CHRONIC PAIN OF BOTH KNEES: Primary | ICD-10-CM

## 2020-01-27 DIAGNOSIS — Z87.828 HISTORY OF TORN MENISCUS OF LEFT KNEE: ICD-10-CM

## 2020-01-27 PROCEDURE — 99213 OFFICE O/P EST LOW 20 MIN: CPT | Mod: S$GLB,,, | Performed by: FAMILY MEDICINE

## 2020-01-27 PROCEDURE — 1125F AMNT PAIN NOTED PAIN PRSNT: CPT | Mod: S$GLB,,, | Performed by: FAMILY MEDICINE

## 2020-01-27 PROCEDURE — 99999 PR PBB SHADOW E&M-EST. PATIENT-LVL V: ICD-10-PCS | Mod: PBBFAC,,, | Performed by: FAMILY MEDICINE

## 2020-01-27 PROCEDURE — 99999 PR PBB SHADOW E&M-EST. PATIENT-LVL V: CPT | Mod: PBBFAC,,, | Performed by: FAMILY MEDICINE

## 2020-01-27 PROCEDURE — 1125F PR PAIN SEVERITY QUANTIFIED, PAIN PRESENT: ICD-10-PCS | Mod: S$GLB,,, | Performed by: FAMILY MEDICINE

## 2020-01-27 PROCEDURE — 99215 OFFICE O/P EST HI 40 MIN: CPT | Mod: PBBFAC,PN | Performed by: FAMILY MEDICINE

## 2020-01-27 PROCEDURE — 1159F PR MEDICATION LIST DOCUMENTED IN MEDICAL RECORD: ICD-10-PCS | Mod: S$GLB,,, | Performed by: FAMILY MEDICINE

## 2020-01-27 PROCEDURE — 1159F MED LIST DOCD IN RCRD: CPT | Mod: S$GLB,,, | Performed by: FAMILY MEDICINE

## 2020-01-27 PROCEDURE — 99213 PR OFFICE/OUTPT VISIT, EST, LEVL III, 20-29 MIN: ICD-10-PCS | Mod: S$GLB,,, | Performed by: FAMILY MEDICINE

## 2020-01-27 NOTE — PATIENT INSTRUCTIONS
Knee Pain of Uncertain Cause    There are several common causes for knee pain. These can include:  · A sprain of the ligaments that support the joint  · An injury to the cartilage lining of the joint  · Arthritis from wear-and-tear or inflammation  There are other causes as well. There may also be swelling, reduced movement of the knee joint, and pain with walking. A definite diagnosis will still need to be made. If your symptoms do not improve, further follow-up and testing may be needed.  Home care  · Stay off the injured leg as much as possible until pain improves.  · Apply an ice pack over the injured area for 15 to 20 minutes every 3 to 6 hours. You should do this for the first 24 to 48 hours. You can make an ice pack by filling a plastic bag that seals at the top with ice cubes and then wrapping it with a thin towel. Continue to use ice packs for relief of pain and swelling as needed. As the ice melts, be careful to avoid getting your wrap, splint, or cast wet. After 48 hours, apply heat (warm shower or warm bath) for 15 to 20 minutes several times a day, or alternate ice and heat. If you have to wear a hook-and-loop knee brace, you can open it to apply the ice pack, or heat, directly to the knee. Never put ice directly on the skin. Always wrap the ice in a towel or other type of cloth.  · You may use over-the-counter pain medicine to control pain, unless another pain medicine was prescribed. If you have chronic liver or kidney disease or ever had a stomach ulcer or GI bleeding, talk with your healthcare provider using these medicines.  · If crutches or a walker have been recommended, do not put weight on the injured leg until you can do so without pain. Check with your healthcare provider before returning to sports or full work duties.  · If you have a hook-and-loop knee brace, you can remove it to bathe and sleep, unless told otherwise.  Follow-up care  Follow up with your healthcare provider as advised.  This is usually within 1-2 weeks.  If X-rays were taken, you will be told of any new findings that may affect your care.  Call 911  Call 911 if you have:  · Shortness of breath  · Chest pain  When to seek medical advice  Call your healthcare provider right away if any of these occur:  · Toes or foot becomes swollen, cold, blue, numb, or tingly  · Pain or swelling spreads over the knee or calf  · Warmth or redness appears over the knee or calf  · Other joints become painful  · Rash appears  · Fever of 100.4°F (38°C) or above lasting for 24 to 48 hours  Date Last Reviewed: 11/23/2015  © 6446-3071 [employer]. [address] [place] [address], [place] [address]. All rights reserved. This information is not intended as a substitute for professional medical care. Always follow your healthcare professional's instructions.

## 2020-01-27 NOTE — PROGRESS NOTES
"[facility]  [facility] - Family Medicine/ Internal Medicine  Clinic Note      Subjective:       Patient ID: Sidney Guillen is a 69 y.o. male.    Chief Complaint: Both Knee Pain    Patient is here today for follow-up visit.  He notes recurrent left knee pain in the last 2 weeks, which is reminiscent of prior left knee meniscus surgery in 2011.    Knee Pain    There was no injury mechanism. The pain is present in the left knee and right knee. The quality of the pain is described as stabbing. The pain is moderate. The pain has been intermittent since onset. Pertinent negatives include no inability to bear weight, loss of sensation, muscle weakness, numbness or tingling. Associated symptoms comments: Knee is not giving out. He reports no foreign bodies present. The symptoms are aggravated by weight bearing (walking down stairs). He has tried nothing for the symptoms.     Review of Systems   Constitutional: Negative for fatigue and fever.   Respiratory: Negative for cough and shortness of breath.    Gastrointestinal: Negative for abdominal pain, diarrhea, nausea and vomiting.   Musculoskeletal: Positive for arthralgias. Negative for gait problem, joint swelling and myalgias.   Neurological: Negative for tingling, weakness and numbness.       Objective:      /80 (BP Location: Left arm, Patient Position: Sitting, BP Method: Medium (Manual))   Pulse 69   Ht 5' 6" (1.676 m)   Wt 74.6 kg (164 lb 7.4 oz)   SpO2 96%   BMI 26.55 kg/m²   Physical Exam   Constitutional: He is oriented to person, place, and time. He appears well-developed and well-nourished. No distress.   Neck: Normal range of motion.   Cardiovascular: Normal rate and regular rhythm.   No murmur heard.  Pulmonary/Chest: Effort normal and breath sounds normal. He has no wheezes. He has no rales.   Abdominal: Soft. Bowel sounds are normal.   Musculoskeletal: Normal range of motion.        Left knee: He exhibits normal range of motion, no " swelling, no effusion, no ecchymosis, no deformity, normal patellar mobility and no bony tenderness. No tenderness found. No medial joint line, no lateral joint line and no patellar tendon tenderness noted.   Neurological: He is alert and oriented to person, place, and time.   Skin: Skin is warm and dry. No rash noted.   Psychiatric: He has a normal mood and affect.   Vitals reviewed.      Assessment:       1. Chronic pain of both knees    2. History of torn meniscus of left knee        Plan:     1. Chronic pain of both knees  2. History of torn meniscus of left knee  - history and exam findings reviewed, will recheck XR and refer back to Orthopedic Surgery  - - X-Ray Knee AP Standing Bilateral; Future  - - Ambulatory referral/consult to Orthopedics; Future  - patient is not entirely sure which knee had been operated on in the past, will request records from [place] to verify  - supportive care measures reviewed  - treatment options reviewed, steroid knee injection in the contralateral knee may be considered if XR is showing significant arthritis  - questions answered, warning signs reviewed, patient is comfortable with this plan and was encouraged to call the office for any concerns or worsening of condition     - Follow up in 2-4 weeks as needed, for follow-up knee pain.     Daniel Batres MD  1/27/2020          Medication List with Changes/Refills   Current Medications    AMLODIPINE (NORVASC) 10 MG TABLET    TAKE 1 TABLET BY MOUTH EVERY DAY    ATORVASTATIN (LIPITOR) 10 MG TABLET    Take 1 tablet (10 mg total) by mouth once daily.    CETIRIZINE (ZYRTEC) 10 MG TABLET    Take 10 mg by mouth once daily.    CHOLECALCIFEROL, VITAMIN D3, (VITAMIN D3) 2,000 UNIT CAP        DICLOFENAC POTASSIUM ORAL    Take 75 mg by mouth as needed.     FLUTICASONE PROPIONATE (FLONASE) 50 MCG/ACTUATION NASAL SPRAY    2 SPRAYS (100 MCG TOTAL) BY EACH NOSTRIL ROUTE ONCE DAILY.    LOSARTAN (COZAAR) 25 MG TABLET    Take 1 tablet (25 mg  total) by mouth once daily.

## 2020-01-29 ENCOUNTER — HOSPITAL ENCOUNTER (OUTPATIENT)
Dept: RADIOLOGY | Facility: HOSPITAL | Age: 70
Discharge: HOME | End: 2020-01-29
Attending: FAMILY MEDICINE
Payer: COMMERCIAL

## 2020-01-29 DIAGNOSIS — M25.562 CHRONIC PAIN OF BOTH KNEES: ICD-10-CM

## 2020-01-29 DIAGNOSIS — G89.29 CHRONIC PAIN OF BOTH KNEES: ICD-10-CM

## 2020-01-29 DIAGNOSIS — M25.561 CHRONIC PAIN OF BOTH KNEES: ICD-10-CM

## 2020-01-29 PROCEDURE — 73565 XR KNEE AP STANDING BILATERAL: ICD-10-PCS | Mod: 26,,, | Performed by: RADIOLOGY

## 2020-01-29 PROCEDURE — 73565 X-RAY EXAM OF KNEES: CPT | Mod: TC,PO

## 2020-01-29 PROCEDURE — 73565 X-RAY EXAM OF KNEES: CPT | Mod: 26,,, | Performed by: RADIOLOGY

## 2020-01-30 ENCOUNTER — TELEPHONE (OUTPATIENT)
Dept: INTERNAL MEDICINE | Facility: CLINIC | Age: 70
End: 2020-01-30

## 2020-01-30 NOTE — TELEPHONE ENCOUNTER
"MyChart message sent.  Please let the patient know their results, thank you:    " Hi Mr. Guillen,    I have reviewed the report from your recent X-ray.  The results are showing just mild arthritic changes in your knees.  These may not be severe enough to explain your pain.  Let's see what the Orthopedist recommends in terms of additional imaging to evaluate your pain, and whether or not they would recommend physical therapy or other treatment at this point.  In the meantime, please try to remember which knee what operated on, and we could also order an MRI to evaluate further while we are waiting to see the specialist.  Please contact the office if you have any additional questions or concerns.    Daniel Batres MD      "

## 2020-02-03 ENCOUNTER — TELEPHONE (OUTPATIENT)
Dept: INTERNAL MEDICINE | Facility: CLINIC | Age: 70
End: 2020-02-03

## 2020-02-03 DIAGNOSIS — H91.90 HEARING LOSS, UNSPECIFIED HEARING LOSS TYPE, UNSPECIFIED LATERALITY: Primary | ICD-10-CM

## 2020-02-04 ENCOUNTER — TELEPHONE (OUTPATIENT)
Dept: OTOLARYNGOLOGY | Facility: CLINIC | Age: 70
End: 2020-02-04

## 2020-02-04 NOTE — TELEPHONE ENCOUNTER
Called pt per Dr. Hickey.      ----- Message from PAZ Bowser sent at 2/4/2020 10:12 AM CST -----  Contact: JEFFREY BENNETT [[id number]]  Please see message below and contact patient to ask exactly what kind of appointment is being requested so that we can better assist him.  Once you have clarification from the patient, let me know and I'll work with you to find an appointment.    Thanks,  Dr. Hickey  ----- Message -----  From: Kaykay Flood  Sent: 2/4/2020  10:02 AM CST  To: PAZ Bowser    Name of Who is Calling: JEFFREY BENNETT [[id number]]      What is the request in detail: Pt is requesting a call back from clinical team in regard to getiing schedule appointment .Please contact to further discuss and advise.          Can the clinic reply by [facility]:       What Number to Call Back if not in [facility]: [phone]

## 2020-02-07 ENCOUNTER — TELEPHONE (OUTPATIENT)
Dept: INTERNAL MEDICINE | Facility: CLINIC | Age: 70
End: 2020-02-07

## 2020-02-11 ENCOUNTER — TELEPHONE (OUTPATIENT)
Dept: INTERNAL MEDICINE | Facility: CLINIC | Age: 70
End: 2020-02-11

## 2020-02-11 ENCOUNTER — CLINICAL SUPPORT (OUTPATIENT)
Dept: AUDIOLOGY | Facility: CLINIC | Age: 70
End: 2020-02-11
Payer: COMMERCIAL

## 2020-02-11 DIAGNOSIS — H90.3 SENSORINEURAL HEARING LOSS, BILATERAL: Primary | ICD-10-CM

## 2020-02-11 PROCEDURE — 99999 PR PBB SHADOW E&M-EST. PATIENT-LVL I: ICD-10-PCS | Mod: PBBFAC,,, | Performed by: AUDIOLOGIST

## 2020-02-11 PROCEDURE — 99999 PR PBB SHADOW E&M-EST. PATIENT-LVL I: CPT | Mod: PBBFAC,,, | Performed by: AUDIOLOGIST

## 2020-02-11 PROCEDURE — 99211 OFF/OP EST MAY X REQ PHY/QHP: CPT | Mod: PBBFAC,25 | Performed by: AUDIOLOGIST

## 2020-02-11 PROCEDURE — 92567 TYMPANOMETRY: CPT | Mod: PBBFAC | Performed by: AUDIOLOGIST

## 2020-02-11 PROCEDURE — 92557 COMPREHENSIVE HEARING TEST: CPT | Mod: PBBFAC | Performed by: AUDIOLOGIST

## 2020-02-11 NOTE — PROGRESS NOTES
Wangevettewilliam Guillen was seen today for a hearing evaluation.     Pure tone audiometry revealed a mild - moderate SNHL, AU  SRT and PTA are in good agreement bilaterally.  Excellent speech discrimination for the right ear: Excellent for the left ear   Tympanometry revealed Type A, bilaterally      Recommendations:  1. Otologic Evaluation  2. Annual Audiogram or repeat audiogram as needed  3. Hearing Protection  4. Hearing Aid Consult

## 2020-03-03 ENCOUNTER — CLINICAL SUPPORT (OUTPATIENT)
Dept: AUDIOLOGY | Facility: CLINIC | Age: 70
End: 2020-03-03
Payer: MEDICARE

## 2020-03-03 DIAGNOSIS — H90.3 SENSORINEURAL HEARING LOSS, BILATERAL: Primary | ICD-10-CM

## 2020-03-03 PROCEDURE — 99499 UNLISTED E&M SERVICE: CPT | Mod: S$PBB,,, | Performed by: AUDIOLOGIST-HEARING AID FITTER

## 2020-03-03 PROCEDURE — 99499 NO LOS: ICD-10-PCS | Mod: S$PBB,,, | Performed by: AUDIOLOGIST-HEARING AID FITTER

## 2020-03-03 NOTE — PROGRESS NOTES
Sidney Guillen was seen in the clinic today for a hearing aid consult.     Pricing and styles were discussed. Two hearing aids were recommended. Mr. Guillen decided to order a pair of [employer] M90-RT hearing aids in P6 with #2 M receivers and small open domes. The contract was signed and Mr. Guillen was given a copy. An appointment was scheduled for Mr. Guillen to return to the clinic to  the hearing aids.

## 2020-03-11 ENCOUNTER — OFFICE VISIT (OUTPATIENT)
Dept: INTERNAL MEDICINE | Facility: CLINIC | Age: 70
End: 2020-03-11
Payer: COMMERCIAL

## 2020-03-11 VITALS
HEART RATE: 60 BPM | HEIGHT: 66 IN | DIASTOLIC BLOOD PRESSURE: 78 MMHG | BODY MASS INDEX: 26.43 KG/M2 | WEIGHT: 164.44 LBS | SYSTOLIC BLOOD PRESSURE: 136 MMHG | OXYGEN SATURATION: 96 %

## 2020-03-11 DIAGNOSIS — M25.561 CHRONIC PAIN OF BOTH KNEES: ICD-10-CM

## 2020-03-11 DIAGNOSIS — G89.29 CHRONIC PAIN OF BOTH KNEES: ICD-10-CM

## 2020-03-11 DIAGNOSIS — N52.9 ERECTILE DYSFUNCTION, UNSPECIFIED ERECTILE DYSFUNCTION TYPE: ICD-10-CM

## 2020-03-11 DIAGNOSIS — M25.562 CHRONIC PAIN OF BOTH KNEES: ICD-10-CM

## 2020-03-11 DIAGNOSIS — I10 BENIGN HYPERTENSION: Primary | ICD-10-CM

## 2020-03-11 PROCEDURE — 99213 PR OFFICE/OUTPT VISIT, EST, LEVL III, 20-29 MIN: ICD-10-PCS | Mod: S$GLB,,, | Performed by: FAMILY MEDICINE

## 2020-03-11 PROCEDURE — 99213 OFFICE O/P EST LOW 20 MIN: CPT | Mod: PBBFAC,PN | Performed by: FAMILY MEDICINE

## 2020-03-11 PROCEDURE — 99213 OFFICE O/P EST LOW 20 MIN: CPT | Mod: S$GLB,,, | Performed by: FAMILY MEDICINE

## 2020-03-11 PROCEDURE — 99999 PR PBB SHADOW E&M-EST. PATIENT-LVL III: ICD-10-PCS | Mod: PBBFAC,,, | Performed by: FAMILY MEDICINE

## 2020-03-11 PROCEDURE — 99999 PR PBB SHADOW E&M-EST. PATIENT-LVL III: CPT | Mod: PBBFAC,,, | Performed by: FAMILY MEDICINE

## 2020-03-11 RX ORDER — GLUCOSAMINE/CHONDRO SU A 500-400 MG
2 TABLET ORAL DAILY
COMMUNITY

## 2020-03-11 RX ORDER — LOSARTAN POTASSIUM 25 MG/1
25 TABLET ORAL DAILY
Qty: 90 TABLET | Refills: 3 | Status: SHIPPED | OUTPATIENT
Start: 2020-03-11 | End: 2020-05-26 | Stop reason: SDUPTHER

## 2020-03-11 RX ORDER — DICLOFENAC SODIUM 75 MG/1
75 TABLET, DELAYED RELEASE ORAL 2 TIMES DAILY PRN
Qty: 90 TABLET | Refills: 1 | Status: SHIPPED | OUTPATIENT
Start: 2020-03-11 | End: 2020-04-15 | Stop reason: SDUPTHER

## 2020-03-11 RX ORDER — AMLODIPINE BESYLATE 10 MG/1
10 TABLET ORAL DAILY
Qty: 90 TABLET | Refills: 3 | Status: SHIPPED | OUTPATIENT
Start: 2020-03-11 | End: 2020-06-11 | Stop reason: SDUPTHER

## 2020-03-11 NOTE — PROGRESS NOTES
[facility]  [facility] Family Medicine/ Internal Medicine  Clinic Note      Subjective:       Patient ID: Sidney Guillen is a 69 y.o. male.    Chief Complaint: Hypertension    Patient is here today for follow-up visit.  He was last seen for recurrence of chronic left knee pain and referred to Orthopedics, who recommended medication management as opposed to any surgical options.  He notes the pain is manageable at this point and reassured that no further surgery will be needed.    Hypertension   This is a chronic problem. The problem is unchanged. The problem is controlled. Pertinent negatives include no anxiety, chest pain, headaches, malaise/fatigue, palpitations, peripheral edema or shortness of breath. There are no associated agents to hypertension. There are no known risk factors for coronary artery disease. Past treatments include angiotensin blockers and calcium channel blockers. The current treatment provides significant improvement. There are no compliance problems.  There is no history of kidney disease, CAD/MI or CVA.   Knee Pain    There was no injury mechanism. The pain is present in the left knee and right knee. The quality of the pain is described as stabbing. The pain is moderate. The pain has been intermittent since onset. Pertinent negatives include no inability to bear weight, loss of sensation, muscle weakness, numbness or tingling. Associated symptoms comments: Knee is not giving out. He reports no foreign bodies present. The symptoms are aggravated by weight bearing (walking down stairs). He has tried NSAIDs (glucosamine/chondroitin) for the symptoms. The treatment provided moderate relief.     Review of Systems   Constitutional: Negative for fatigue, fever and malaise/fatigue.   Respiratory: Negative for cough and shortness of breath.    Cardiovascular: Negative for chest pain and palpitations.   Gastrointestinal: Negative for abdominal pain, diarrhea, nausea and vomiting.  "  Musculoskeletal: Positive for arthralgias and joint swelling. Negative for myalgias.   Neurological: Negative for dizziness, tingling, syncope, numbness and headaches.       Objective:      /78   Pulse 60   Ht 5' 6" (1.676 m)   Wt 74.6 kg (164 lb 7.4 oz)   SpO2 96%   BMI 26.55 kg/m²   Physical Exam   Constitutional: He is oriented to person, place, and time. He appears well-developed and well-nourished. No distress.   Neck: Normal range of motion.   Cardiovascular: Normal rate and regular rhythm.   No murmur heard.  Pulmonary/Chest: Effort normal and breath sounds normal. He has no wheezes. He has no rales.   Abdominal: Soft. Bowel sounds are normal. He exhibits no distension. There is no tenderness.   Musculoskeletal: He exhibits no edema.        Left knee: He exhibits decreased range of motion, swelling and bony tenderness. He exhibits no deformity and no erythema. Tenderness found.   Neurological: He is alert and oriented to person, place, and time.   Skin: Skin is warm and dry. No rash noted.   Psychiatric: He has a normal mood and affect.   Vitals reviewed.      Assessment:       1. Benign hypertension    2. Erectile dysfunction, unspecified erectile dysfunction type    3. Chronic pain of both knees        Plan:     1. Benign hypertension  - chronic condition, well-controlled, will continue current regimen and refill provided   - - amLODIPine (NORVASC) 10 MG tablet; Take 1 tablet (10 mg total) by mouth once daily.  Dispense: 90 tablet; Refill: 3  - - losartan (COZAAR) 25 MG tablet; Take 1 tablet (25 mg total) by mouth once daily.  Dispense: 90 tablet; Refill: 3  .cbdiet     2. Erectile dysfunction, unspecified erectile dysfunction type  - history reviewed, will check labs and we discussed possible trial of PDE-5i medication, patient will consider this option after labs return  - - Testosterone; Future  - - CBC auto differential; Future  - - Comprehensive metabolic panel; Future  - - Lipid panel; " Future    3. Chronic pain of both knees  - continue follow-up with Orthopedics as directed for routine care  - - diclofenac (VOLTAREN) 75 MG EC tablet; Take 1 tablet (75 mg total) by mouth 2 (two) times daily as needed.  Dispense: 90 tablet; Refill: 1     - Follow up in about 3 months (around 6/11/2020) for follow-up blood pressure.     Daniel Batres MD  3/11/2020          Medication List with Changes/Refills   New Medications    DICLOFENAC (VOLTAREN) 75 MG EC TABLET    Take 1 tablet (75 mg total) by mouth 2 (two) times daily as needed.   Current Medications    ATORVASTATIN (LIPITOR) 10 MG TABLET    Take 1 tablet (10 mg total) by mouth once daily.    CETIRIZINE (ZYRTEC) 10 MG TABLET    Take 10 mg by mouth once daily.    CHOLECALCIFEROL, VITAMIN D3, (VITAMIN D3) 2,000 UNIT CAP        FLUTICASONE PROPIONATE (FLONASE) 50 MCG/ACTUATION NASAL SPRAY    2 SPRAYS (100 MCG TOTAL) BY EACH NOSTRIL ROUTE ONCE DAILY.    GLUCOSAMINE-CHONDROITIN 500-400 MG TABLET    Take 2 tablets by mouth once daily.   Changed and/or Refilled Medications    Modified Medication Previous Medication    AMLODIPINE (NORVASC) 10 MG TABLET amLODIPine (NORVASC) 10 MG tablet       Take 1 tablet (10 mg total) by mouth once daily.    TAKE 1 TABLET BY MOUTH EVERY DAY    LOSARTAN (COZAAR) 25 MG TABLET losartan (COZAAR) 25 MG tablet       Take 1 tablet (25 mg total) by mouth once daily.    Take 1 tablet (25 mg total) by mouth once daily.   Discontinued Medications    DICLOFENAC POTASSIUM ORAL    Take 75 mg by mouth as needed.

## 2020-03-12 ENCOUNTER — LAB VISIT (OUTPATIENT)
Dept: LAB | Facility: HOSPITAL | Age: 70
End: 2020-03-12
Attending: FAMILY MEDICINE

## 2020-03-12 DIAGNOSIS — N52.9 ERECTILE DYSFUNCTION, UNSPECIFIED ERECTILE DYSFUNCTION TYPE: ICD-10-CM

## 2020-03-12 LAB
ALBUMIN SERPL BCP-MCNC: 4.1 G/DL (ref 3.5–5.2)
ALP SERPL-CCNC: 70 U/L (ref 55–135)
ALT SERPL W/O P-5'-P-CCNC: 21 U/L (ref 10–44)
ANION GAP SERPL CALC-SCNC: 7 MMOL/L (ref 8–16)
AST SERPL-CCNC: 25 U/L (ref 10–40)
BASOPHILS # BLD AUTO: 0.04 K/UL (ref 0–0.2)
BASOPHILS NFR BLD: 0.9 % (ref 0–1.9)
BILIRUB SERPL-MCNC: 0.9 MG/DL (ref 0.1–1)
BUN SERPL-MCNC: 10 MG/DL (ref 8–23)
CALCIUM SERPL-MCNC: 9.4 MG/DL (ref 8.7–10.5)
CHLORIDE SERPL-SCNC: 107 MMOL/L (ref 95–110)
CHOLEST SERPL-MCNC: 161 MG/DL (ref 120–199)
CHOLEST/HDLC SERPL: 3.2 {RATIO} (ref 2–5)
CO2 SERPL-SCNC: 28 MMOL/L (ref 23–29)
CREAT SERPL-MCNC: 0.8 MG/DL (ref 0.5–1.4)
DIFFERENTIAL METHOD: ABNORMAL
EOSINOPHIL # BLD AUTO: 0.3 K/UL (ref 0–0.5)
EOSINOPHIL NFR BLD: 5.6 % (ref 0–8)
ERYTHROCYTE [DISTWIDTH] IN BLOOD BY AUTOMATED COUNT: 12.3 % (ref 11.5–14.5)
EST. GFR  (AFRICAN AMERICAN): >60 ML/MIN/1.73 M^2
EST. GFR  (NON AFRICAN AMERICAN): >60 ML/MIN/1.73 M^2
GLUCOSE SERPL-MCNC: 91 MG/DL (ref 70–110)
HCT VFR BLD AUTO: 46.2 % (ref 40–54)
HDLC SERPL-MCNC: 50 MG/DL (ref 40–75)
HDLC SERPL: 31.1 % (ref 20–50)
HGB BLD-MCNC: 15.1 G/DL (ref 14–18)
IMM GRANULOCYTES # BLD AUTO: 0.02 K/UL (ref 0–0.04)
IMM GRANULOCYTES NFR BLD AUTO: 0.4 % (ref 0–0.5)
LDLC SERPL CALC-MCNC: 92.2 MG/DL (ref 63–159)
LYMPHOCYTES # BLD AUTO: 1.5 K/UL (ref 1–4.8)
LYMPHOCYTES NFR BLD: 34.2 % (ref 18–48)
MCH RBC QN AUTO: 31.7 PG (ref 27–31)
MCHC RBC AUTO-ENTMCNC: 32.7 G/DL (ref 32–36)
MCV RBC AUTO: 97 FL (ref 82–98)
MONOCYTES # BLD AUTO: 0.4 K/UL (ref 0.3–1)
MONOCYTES NFR BLD: 8.3 % (ref 4–15)
NEUTROPHILS # BLD AUTO: 2.3 K/UL (ref 1.8–7.7)
NEUTROPHILS NFR BLD: 50.6 % (ref 38–73)
NONHDLC SERPL-MCNC: 111 MG/DL
NRBC BLD-RTO: 0 /100 WBC
PLATELET # BLD AUTO: 206 K/UL (ref 150–350)
PMV BLD AUTO: 10.7 FL (ref 9.2–12.9)
POTASSIUM SERPL-SCNC: 4 MMOL/L (ref 3.5–5.1)
PROT SERPL-MCNC: 7.2 G/DL (ref 6–8.4)
RBC # BLD AUTO: 4.77 M/UL (ref 4.6–6.2)
SODIUM SERPL-SCNC: 142 MMOL/L (ref 136–145)
TESTOST SERPL-MCNC: 477 NG/DL (ref 304–1227)
TRIGL SERPL-MCNC: 94 MG/DL (ref 30–150)
WBC # BLD AUTO: 4.47 K/UL (ref 3.9–12.7)

## 2020-03-12 PROCEDURE — 80061 LIPID PANEL: CPT

## 2020-03-12 PROCEDURE — 84403 ASSAY OF TOTAL TESTOSTERONE: CPT

## 2020-03-12 PROCEDURE — 80053 COMPREHEN METABOLIC PANEL: CPT

## 2020-03-12 PROCEDURE — 36415 COLL VENOUS BLD VENIPUNCTURE: CPT

## 2020-03-12 PROCEDURE — 85025 COMPLETE CBC W/AUTO DIFF WBC: CPT

## 2020-03-16 ENCOUNTER — TELEPHONE (OUTPATIENT)
Dept: INTERNAL MEDICINE | Facility: CLINIC | Age: 70
End: 2020-03-16

## 2020-03-16 NOTE — TELEPHONE ENCOUNTER
"Arista Powerhart message sent.  Please let the patient know their results, thank you:    " Hi Mr. Guillen,    I have reviewed your recent results.  Your blood counts are all within acceptable limits and do not show any anemia.  Your electrolytes, blood sugar, and kidney/liver function are all normal.  Your cholesterol levels are all normal, as well as your testosterone level.  There is nothing to worry about in these results.  Please contact the office if you have any additional questions or concerns.    Daniel Batres MD      "

## 2020-03-17 DIAGNOSIS — J30.9 CHRONIC ALLERGIC RHINITIS: ICD-10-CM

## 2020-03-17 RX ORDER — FLUTICASONE PROPIONATE 50 MCG
2 SPRAY, SUSPENSION (ML) NASAL DAILY
Qty: 48 ML | Refills: 2 | Status: SHIPPED | OUTPATIENT
Start: 2020-03-17 | End: 2020-04-28 | Stop reason: SDUPTHER

## 2020-04-15 ENCOUNTER — PATIENT MESSAGE (OUTPATIENT)
Dept: AUDIOLOGY | Facility: CLINIC | Age: 70
End: 2020-04-15

## 2020-04-15 DIAGNOSIS — M25.562 CHRONIC PAIN OF BOTH KNEES: ICD-10-CM

## 2020-04-15 DIAGNOSIS — M25.561 CHRONIC PAIN OF BOTH KNEES: ICD-10-CM

## 2020-04-15 DIAGNOSIS — G89.29 CHRONIC PAIN OF BOTH KNEES: ICD-10-CM

## 2020-04-15 RX ORDER — DICLOFENAC SODIUM 75 MG/1
75 TABLET, DELAYED RELEASE ORAL 2 TIMES DAILY PRN
Qty: 90 TABLET | Refills: 1 | Status: CANCELLED | OUTPATIENT
Start: 2020-04-15

## 2020-04-15 RX ORDER — DICLOFENAC SODIUM 75 MG/1
75 TABLET, DELAYED RELEASE ORAL 2 TIMES DAILY PRN
Qty: 90 TABLET | Refills: 1 | Status: SHIPPED | OUTPATIENT
Start: 2020-04-15 | End: 2020-06-11 | Stop reason: SDUPTHER

## 2020-04-19 ENCOUNTER — PATIENT MESSAGE (OUTPATIENT)
Dept: INTERNAL MEDICINE | Facility: CLINIC | Age: 70
End: 2020-04-19

## 2020-04-20 DIAGNOSIS — M25.561 CHRONIC PAIN OF BOTH KNEES: ICD-10-CM

## 2020-04-20 DIAGNOSIS — M25.562 CHRONIC PAIN OF BOTH KNEES: ICD-10-CM

## 2020-04-20 DIAGNOSIS — G89.29 CHRONIC PAIN OF BOTH KNEES: ICD-10-CM

## 2020-04-20 RX ORDER — DICLOFENAC SODIUM 75 MG/1
75 TABLET, DELAYED RELEASE ORAL 2 TIMES DAILY PRN
Qty: 90 TABLET | Refills: 1 | Status: CANCELLED | OUTPATIENT
Start: 2020-04-20

## 2020-04-20 NOTE — TELEPHONE ENCOUNTER
Spoke to pharmacy. pharmacy stated that patient medication is ready. Patient is just needing to update is Rx coverage.

## 2020-04-23 ENCOUNTER — PATIENT MESSAGE (OUTPATIENT)
Dept: INTERNAL MEDICINE | Facility: CLINIC | Age: 70
End: 2020-04-23

## 2020-04-28 DIAGNOSIS — J30.9 CHRONIC ALLERGIC RHINITIS: ICD-10-CM

## 2020-04-28 RX ORDER — FLUTICASONE PROPIONATE 50 MCG
2 SPRAY, SUSPENSION (ML) NASAL DAILY
Qty: 48 ML | Refills: 2 | Status: SHIPPED | OUTPATIENT
Start: 2020-04-28 | End: 2021-06-11 | Stop reason: SDUPTHER

## 2020-05-03 DIAGNOSIS — J30.9 CHRONIC ALLERGIC RHINITIS: ICD-10-CM

## 2020-05-03 RX ORDER — FLUTICASONE PROPIONATE 50 MCG
2 SPRAY, SUSPENSION (ML) NASAL DAILY
Qty: 48 ML | Refills: 2 | Status: CANCELLED | OUTPATIENT
Start: 2020-05-03

## 2020-05-06 ENCOUNTER — TELEPHONE (OUTPATIENT)
Dept: AUDIOLOGY | Facility: CLINIC | Age: 70
End: 2020-05-06

## 2020-05-08 NOTE — PROGRESS NOTES
Zion Guillen was seen in the clinic today to  his hearing aids.    The hearing aids were programmed. The fitting formula was changed to NAL-NL 2. MPO was increased. Acclimatization was set to 90%. Mr. Guillen was pleased with how the hearing aids sounded. The alerts were demonstrated. Mr. guillen was shown how to properly place the hearing aids in the  and how to turn them on and off. Insertion/removal as well as care and maintenance were also reviewed. The hearing aids were paired to the iPhone. Streaming was enabled. We were unable to download the luana in the office today. Mr. Guillen was encouraged to try downloading the luana at home and that we would review navigating the luana at his follow-up appointment.     A two week follow-up appointment was scheduled. Mr. Guillen was encouraged to call the clinic if he needed to be seen sooner.     Hearing Aid Information:    Right ear  : Phonak  Model: [employer] M90-RT  Type: SRI  Color: Silver gray  Battery: Lithium-ion  Tube/ length & power: #2 M  Dome size & style: Phonak small open   Serial number: [id number]  Warranty expiration: 05/31/2020   L and D expiration: 05/31/2020      Left ear  :    Model:    Type:    Color:    Battery:  Tube/ length & power:    Dome size & style:    Serial number: [id number]  Warranty expiration:    L and D expiration:       Serial Number: [id number]

## 2020-05-11 ENCOUNTER — CLINICAL SUPPORT (OUTPATIENT)
Dept: AUDIOLOGY | Facility: CLINIC | Age: 70
End: 2020-05-11
Payer: COMMERCIAL

## 2020-05-11 DIAGNOSIS — H90.3 SENSORINEURAL HEARING LOSS, BILATERAL: Primary | ICD-10-CM

## 2020-05-25 ENCOUNTER — CLINICAL SUPPORT (OUTPATIENT)
Dept: AUDIOLOGY | Facility: CLINIC | Age: 70
End: 2020-05-25
Payer: COMMERCIAL

## 2020-05-25 DIAGNOSIS — H90.3 SENSORINEURAL HEARING LOSS, BILATERAL: Primary | ICD-10-CM

## 2020-05-25 PROCEDURE — 99499 UNLISTED E&M SERVICE: CPT | Mod: S$GLB,,, | Performed by: AUDIOLOGIST-HEARING AID FITTER

## 2020-05-25 PROCEDURE — 99499 NO LOS: ICD-10-PCS | Mod: S$GLB,,, | Performed by: AUDIOLOGIST-HEARING AID FITTER

## 2020-05-25 NOTE — PROGRESS NOTES
Zion Guillen was seen in the clinic today for his first hearing aid follow-up.    Overall, Mr. Guillen was pleased with the hearing aids. No adjustments were made. All questions were answered.    Mr. Guillen will call the clinic for a follow-up appointment as needed. A one year appointment reminder will be put in to the Epic system.

## 2020-05-26 DIAGNOSIS — I10 BENIGN HYPERTENSION: ICD-10-CM

## 2020-05-26 RX ORDER — LOSARTAN POTASSIUM 25 MG/1
25 TABLET ORAL DAILY
Qty: 90 TABLET | Refills: 3 | Status: SHIPPED | OUTPATIENT
Start: 2020-05-26 | End: 2020-06-11 | Stop reason: SDUPTHER

## 2020-06-11 ENCOUNTER — OFFICE VISIT (OUTPATIENT)
Dept: INTERNAL MEDICINE | Facility: CLINIC | Age: 70
End: 2020-06-11
Payer: COMMERCIAL

## 2020-06-11 VITALS
OXYGEN SATURATION: 96 % | DIASTOLIC BLOOD PRESSURE: 78 MMHG | BODY MASS INDEX: 26.22 KG/M2 | HEIGHT: 66 IN | WEIGHT: 163.13 LBS | SYSTOLIC BLOOD PRESSURE: 124 MMHG | HEART RATE: 60 BPM | TEMPERATURE: 98 F

## 2020-06-11 DIAGNOSIS — G89.29 CHRONIC PAIN OF BOTH KNEES: ICD-10-CM

## 2020-06-11 DIAGNOSIS — E78.2 MIXED HYPERLIPIDEMIA: ICD-10-CM

## 2020-06-11 DIAGNOSIS — M25.561 CHRONIC PAIN OF BOTH KNEES: ICD-10-CM

## 2020-06-11 DIAGNOSIS — I10 BENIGN HYPERTENSION: ICD-10-CM

## 2020-06-11 DIAGNOSIS — M25.562 CHRONIC PAIN OF BOTH KNEES: ICD-10-CM

## 2020-06-11 PROCEDURE — 99213 PR OFFICE/OUTPT VISIT, EST, LEVL III, 20-29 MIN: ICD-10-PCS | Mod: S$GLB,,, | Performed by: FAMILY MEDICINE

## 2020-06-11 PROCEDURE — 99999 PR PBB SHADOW E&M-EST. PATIENT-LVL IV: CPT | Mod: PBBFAC,,, | Performed by: FAMILY MEDICINE

## 2020-06-11 PROCEDURE — 1159F PR MEDICATION LIST DOCUMENTED IN MEDICAL RECORD: ICD-10-PCS | Mod: S$GLB,,, | Performed by: FAMILY MEDICINE

## 2020-06-11 PROCEDURE — 1101F PR PT FALLS ASSESS DOC 0-1 FALLS W/OUT INJ PAST YR: ICD-10-PCS | Mod: CPTII,S$GLB,, | Performed by: FAMILY MEDICINE

## 2020-06-11 PROCEDURE — 99999 PR PBB SHADOW E&M-EST. PATIENT-LVL IV: ICD-10-PCS | Mod: PBBFAC,,, | Performed by: FAMILY MEDICINE

## 2020-06-11 PROCEDURE — 1126F AMNT PAIN NOTED NONE PRSNT: CPT | Mod: S$GLB,,, | Performed by: FAMILY MEDICINE

## 2020-06-11 PROCEDURE — 3074F PR MOST RECENT SYSTOLIC BLOOD PRESSURE < 130 MM HG: ICD-10-PCS | Mod: CPTII,S$GLB,, | Performed by: FAMILY MEDICINE

## 2020-06-11 PROCEDURE — 3078F PR MOST RECENT DIASTOLIC BLOOD PRESSURE < 80 MM HG: ICD-10-PCS | Mod: CPTII,S$GLB,, | Performed by: FAMILY MEDICINE

## 2020-06-11 PROCEDURE — 1159F MED LIST DOCD IN RCRD: CPT | Mod: S$GLB,,, | Performed by: FAMILY MEDICINE

## 2020-06-11 PROCEDURE — 3078F DIAST BP <80 MM HG: CPT | Mod: CPTII,S$GLB,, | Performed by: FAMILY MEDICINE

## 2020-06-11 PROCEDURE — 1126F PR PAIN SEVERITY QUANTIFIED, NO PAIN PRESENT: ICD-10-PCS | Mod: S$GLB,,, | Performed by: FAMILY MEDICINE

## 2020-06-11 PROCEDURE — 1101F PT FALLS ASSESS-DOCD LE1/YR: CPT | Mod: CPTII,S$GLB,, | Performed by: FAMILY MEDICINE

## 2020-06-11 PROCEDURE — 3074F SYST BP LT 130 MM HG: CPT | Mod: CPTII,S$GLB,, | Performed by: FAMILY MEDICINE

## 2020-06-11 PROCEDURE — 99213 OFFICE O/P EST LOW 20 MIN: CPT | Mod: S$GLB,,, | Performed by: FAMILY MEDICINE

## 2020-06-11 RX ORDER — LOSARTAN POTASSIUM 25 MG/1
25 TABLET ORAL DAILY
Qty: 90 TABLET | Refills: 3 | Status: SHIPPED | OUTPATIENT
Start: 2020-06-11 | End: 2021-07-27 | Stop reason: SDUPTHER

## 2020-06-11 RX ORDER — DICLOFENAC SODIUM 75 MG/1
75 TABLET, DELAYED RELEASE ORAL 2 TIMES DAILY PRN
Qty: 90 TABLET | Refills: 3 | Status: SHIPPED | OUTPATIENT
Start: 2020-06-11 | End: 2021-07-01

## 2020-06-11 RX ORDER — AMLODIPINE BESYLATE 10 MG/1
10 TABLET ORAL DAILY
Qty: 90 TABLET | Refills: 3 | Status: SHIPPED | OUTPATIENT
Start: 2020-06-11 | End: 2021-07-12 | Stop reason: SDUPTHER

## 2020-06-11 RX ORDER — ATORVASTATIN CALCIUM 10 MG/1
10 TABLET, FILM COATED ORAL DAILY
Qty: 90 TABLET | Refills: 3 | Status: SHIPPED | OUTPATIENT
Start: 2020-06-11 | End: 2021-07-27 | Stop reason: SDUPTHER

## 2020-06-11 NOTE — PATIENT INSTRUCTIONS
Contrôle de lhypertension artérielle (high blood pressure)  Lhypertension artérielle (high blood pressure) (HTA) est appelée le tueur silencieux. Cest parce que beaucoup de gens rivas en souffrent ne le savent pas. La tension artérielle normale est inférieure à 120/80. Connaissez votre pression artérielle et noubliez pas de la vérifier régulièrement. Cindy peut vous sauver la vie. Voici quelques conseils que vous pouvez suivre pour aider à contrôler votre pression artérielle.    Choisissez shola aliments sains pour le cur  · Choisissez shola aliments à faible teneur en kvng et en graisses.  · Limitez les aliments en conserve, séchés, fumés, salés, emballés, et les fast-foods. Ceux-ci peuvent contenir beaucoup de kvng.  · Mangez 8-10 portions de fruits et légumes chaque jour.  · Choisissez shola viandes maigres, du poisson ou du poulet.  · Mangez shola pâtes à grains entiers, du yvette susie et shola haricots.  · Mangez 2-3 portions de produits laitiers faibles en gras ou sans gras.  · Demandez conseil à votre médecin au sujet du plan DASH. Ce plan permet de réduire la pression artérielle.  Maintenez un poids marcello  · Demandez à votre professionnel de santé combien de calories vous devez manger par jour. Puis tenez-vous en à ce nombre.  · Demandez à votre professionnel de santé quelle fourchette de poids est la plus saine pour vous. Si vous êtes en surpoids, une perte de poids de seulement 10 lb (4.53 kg) peut contribuer à diminuer votre pression artérielle.  · Limitez les collations et friandises.  · Faites de lexercice régulièrement.  Levez-vous et soyez actif.  · Choisissez shola activités que vous aimez. Trouvez shola activités que vous pouvez pratiquer avec shola amis ou en famille.  · Garez-vous plus loin de lentrée shola bâtiments.  · Utilisez les escaliers plutôt que lascenseur.  · Lorsque vous le pouvez, partez à pied ou à vélo plutôt quen voiture.  · Ramassez les feuilles, prenez soin de votre jardin ou faites shola  réparations à la inder.  · Soyez actif pendant au moins 30 minutes par jour, la plupart shola jours de la semaine.  Gérez votre stress  · Prenez le temps de vous détendre et de profiter de la vie. Trouvez le temps de rire.  · Rendez visite à votre famille et à micah amis, et poursuivez micah passe-temps.  Limitez lalcool et arrêtez de fumer  · Hommes: Ne buvez pas plus de 2 verres par jour.  · Femmes: Ne buvez pas plus d1 verre par jour.  · Discutez avec votre professionnel de santé au sujet darrêter de fumer. Fumer augmente votre risque de maladie cardiaque et daccident vasculaire cérébral. Renseignez-vous ruddy les programmes locaux ou communautaires rivas peuvent vous aider.  Médicaments  Si les changements de mode de vie ne suffisent pas, votre professionnel de santé peut vous prescrire un traitement contre lhypertension artérielle (high blood pressure). Prenez micah médicaments tels que prescrits.   Date Last Reviewed: 4/27/2016  © 3545-1090 [employer]. [address] [place] [address], [place] [address]. All rights reserved. This information is not intended as a substitute for professional medical care. Always follow your healthcare professional's instructions.

## 2020-06-11 NOTE — PROGRESS NOTES
"[facility]  [facility] - Family Medicine/ Internal Medicine  Clinic Note      Subjective:       Patient ID: Sidney Guillen is a 70 y.o. male.    Chief Complaint: benign hypetension    Patient is here today for follow-up visit.  He is at baseline health, without recent illness or fever.  He has been able to get back into his routine with daily walk of 5 miles and swimming frequently.  He reports feeling great.  He has chronic HTN, which remains well-controlled.  He is tolerating his medications well.  He has chronic left knee pain which is manageable at this point with daily NSAID use, with no further surgery planned.    Hypertension   This is a chronic problem. The problem is unchanged. The problem is controlled. Pertinent negatives include no anxiety, chest pain, headaches, malaise/fatigue, palpitations, peripheral edema or shortness of breath. There are no associated agents to hypertension. There are no known risk factors for coronary artery disease. Past treatments include angiotensin blockers and calcium channel blockers. The current treatment provides significant improvement. There are no compliance problems.  There is no history of kidney disease, CAD/MI or CVA.     Review of Systems   Constitutional: Negative for fatigue, fever and malaise/fatigue.   Respiratory: Negative for cough and shortness of breath.    Cardiovascular: Negative for chest pain and palpitations.   Musculoskeletal: Positive for arthralgias. Negative for joint swelling.   Neurological: Negative for dizziness and headaches.       Objective:      /78 (BP Location: Left arm, Patient Position: Sitting, BP Method: Medium (Manual))   Pulse 60   Temp 97.8 °F (36.6 °C) (Oral)   Ht 5' 6" (1.676 m)   Wt 74 kg (163 lb 2.3 oz)   SpO2 96%   BMI 26.33 kg/m²   Physical Exam   Constitutional: He is oriented to person, place, and time. He appears well-developed and well-nourished. No distress.   Neck: Normal range of motion. "   Cardiovascular: Normal rate and regular rhythm.   No murmur heard.  Pulmonary/Chest: Effort normal and breath sounds normal. He has no wheezes. He has no rales.   Abdominal: Soft. Bowel sounds are normal. He exhibits no distension and no mass. There is no tenderness. There is no guarding.   Musculoskeletal: Normal range of motion.   Neurological: He is alert and oriented to person, place, and time.   Skin: Skin is warm and dry. No rash noted.   Psychiatric: He has a normal mood and affect.   Vitals reviewed.      Assessment:       1. Benign hypertension    2. Mixed hyperlipidemia    3. Chronic pain of both knees        Plan:     1. Benign hypertension  2. Mixed hyperlipidemia  - chronic condition, well-controlled, will continue current regimen and refill provided   - - amLODIPine (NORVASC) 10 MG tablet; Take 1 tablet (10 mg total) by mouth once daily.  Dispense: 90 tablet; Refill: 3  - - losartan (COZAAR) 25 MG tablet; Take 1 tablet (25 mg total) by mouth once daily.  Dispense: 90 tablet; Refill: 3  - - atorvastatin (LIPITOR) 10 MG tablet; Take 1 tablet (10 mg total) by mouth once daily.  Dispense: 90 tablet; Refill: 3  - labs last done in March, results reviewed and normal  - diet and exercise lifestyle modifications reviewed and recommended  - goal BP reviewed, monitor clinical response, have recommended to continue checking BP at home intermittently  - questions answered, warning signs reviewed, patient is comfortable with this plan and was encouraged to call the office for any concerns or worsening of condition     3. Chronic pain of both knees  - diclofenac (VOLTAREN) 75 MG EC tablet; Take 1 tablet (75 mg total) by mouth 2 (two) times daily as needed.  Dispense: 90 tablet; Refill: 3    - Follow up in about 6 months (around 12/11/2020) for follow-up BP.     Daniel Batres MD  6/11/2020          Medication List with Changes/Refills   Current Medications    CETIRIZINE (ZYRTEC) 10 MG TABLET    Take 10 mg by  mouth once daily.    CHOLECALCIFEROL, VITAMIN D3, (VITAMIN D3) 2,000 UNIT CAP        FLUTICASONE PROPIONATE (FLONASE) 50 MCG/ACTUATION NASAL SPRAY    2 sprays (100 mcg total) by Each Nostril route once daily.    GLUCOSAMINE-CHONDROITIN 500-400 MG TABLET    Take 2 tablets by mouth once daily.   Changed and/or Refilled Medications    Modified Medication Previous Medication    AMLODIPINE (NORVASC) 10 MG TABLET amLODIPine (NORVASC) 10 MG tablet       Take 1 tablet (10 mg total) by mouth once daily.    Take 1 tablet (10 mg total) by mouth once daily.    ATORVASTATIN (LIPITOR) 10 MG TABLET atorvastatin (LIPITOR) 10 MG tablet       Take 1 tablet (10 mg total) by mouth once daily.    Take 1 tablet (10 mg total) by mouth once daily.    DICLOFENAC (VOLTAREN) 75 MG EC TABLET diclofenac (VOLTAREN) 75 MG EC tablet       Take 1 tablet (75 mg total) by mouth 2 (two) times daily as needed.    Take 1 tablet (75 mg total) by mouth 2 (two) times daily as needed.    LOSARTAN (COZAAR) 25 MG TABLET losartan (COZAAR) 25 MG tablet       Take 1 tablet (25 mg total) by mouth once daily.    Take 1 tablet (25 mg total) by mouth once daily.

## 2020-06-20 DIAGNOSIS — E78.2 MIXED HYPERLIPIDEMIA: ICD-10-CM

## 2020-06-20 RX ORDER — ATORVASTATIN CALCIUM 10 MG/1
10 TABLET, FILM COATED ORAL DAILY
Qty: 90 TABLET | Refills: 3 | Status: CANCELLED | OUTPATIENT
Start: 2020-06-20

## 2020-12-03 ENCOUNTER — OFFICE VISIT (OUTPATIENT)
Dept: INTERNAL MEDICINE | Facility: CLINIC | Age: 70
End: 2020-12-03
Payer: COMMERCIAL

## 2020-12-03 VITALS
WEIGHT: 164.69 LBS | OXYGEN SATURATION: 97 % | DIASTOLIC BLOOD PRESSURE: 78 MMHG | SYSTOLIC BLOOD PRESSURE: 125 MMHG | HEIGHT: 66 IN | HEART RATE: 65 BPM | BODY MASS INDEX: 26.47 KG/M2

## 2020-12-03 DIAGNOSIS — I10 ESSENTIAL HYPERTENSION: ICD-10-CM

## 2020-12-03 DIAGNOSIS — E55.9 VITAMIN D DEFICIENCY: ICD-10-CM

## 2020-12-03 DIAGNOSIS — Z23 NEED FOR PNEUMOCOCCAL VACCINE: ICD-10-CM

## 2020-12-03 DIAGNOSIS — L82.1 SEBORRHEIC KERATOSIS: ICD-10-CM

## 2020-12-03 DIAGNOSIS — Z00.00 ANNUAL PHYSICAL EXAM: Primary | ICD-10-CM

## 2020-12-03 DIAGNOSIS — E78.2 MIXED HYPERLIPIDEMIA: ICD-10-CM

## 2020-12-03 PROBLEM — M17.0 BILATERAL PRIMARY OSTEOARTHRITIS OF KNEE: Status: ACTIVE | Noted: 2020-12-03

## 2020-12-03 PROCEDURE — 1101F PR PT FALLS ASSESS DOC 0-1 FALLS W/OUT INJ PAST YR: ICD-10-PCS | Mod: S$GLB,,, | Performed by: INTERNAL MEDICINE

## 2020-12-03 PROCEDURE — 3074F SYST BP LT 130 MM HG: CPT | Mod: S$GLB,,, | Performed by: INTERNAL MEDICINE

## 2020-12-03 PROCEDURE — 99397 PR PREVENTIVE VISIT,EST,65 & OVER: ICD-10-PCS | Mod: S$GLB,,, | Performed by: INTERNAL MEDICINE

## 2020-12-03 PROCEDURE — 1101F PT FALLS ASSESS-DOCD LE1/YR: CPT | Mod: S$GLB,,, | Performed by: INTERNAL MEDICINE

## 2020-12-03 PROCEDURE — 3008F PR BODY MASS INDEX (BMI) DOCUMENTED: ICD-10-PCS | Mod: S$GLB,,, | Performed by: INTERNAL MEDICINE

## 2020-12-03 PROCEDURE — 3078F PR MOST RECENT DIASTOLIC BLOOD PRESSURE < 80 MM HG: ICD-10-PCS | Mod: S$GLB,,, | Performed by: INTERNAL MEDICINE

## 2020-12-03 PROCEDURE — 1126F AMNT PAIN NOTED NONE PRSNT: CPT | Mod: S$GLB,,, | Performed by: INTERNAL MEDICINE

## 2020-12-03 PROCEDURE — 99999 PR PBB SHADOW E&M-EST. PATIENT-LVL IV: CPT | Mod: PBBFAC,,, | Performed by: INTERNAL MEDICINE

## 2020-12-03 PROCEDURE — 3288F FALL RISK ASSESSMENT DOCD: CPT | Mod: S$GLB,,, | Performed by: INTERNAL MEDICINE

## 2020-12-03 PROCEDURE — 3078F DIAST BP <80 MM HG: CPT | Mod: S$GLB,,, | Performed by: INTERNAL MEDICINE

## 2020-12-03 PROCEDURE — 1126F PR PAIN SEVERITY QUANTIFIED, NO PAIN PRESENT: ICD-10-PCS | Mod: S$GLB,,, | Performed by: INTERNAL MEDICINE

## 2020-12-03 PROCEDURE — 3074F PR MOST RECENT SYSTOLIC BLOOD PRESSURE < 130 MM HG: ICD-10-PCS | Mod: S$GLB,,, | Performed by: INTERNAL MEDICINE

## 2020-12-03 PROCEDURE — 99999 PR PBB SHADOW E&M-EST. PATIENT-LVL IV: ICD-10-PCS | Mod: PBBFAC,,, | Performed by: INTERNAL MEDICINE

## 2020-12-03 PROCEDURE — 3288F PR FALLS RISK ASSESSMENT DOCUMENTED: ICD-10-PCS | Mod: S$GLB,,, | Performed by: INTERNAL MEDICINE

## 2020-12-03 PROCEDURE — 99397 PER PM REEVAL EST PAT 65+ YR: CPT | Mod: S$GLB,,, | Performed by: INTERNAL MEDICINE

## 2020-12-03 PROCEDURE — 3008F BODY MASS INDEX DOCD: CPT | Mod: S$GLB,,, | Performed by: INTERNAL MEDICINE

## 2020-12-03 NOTE — PROGRESS NOTES
Subjective:       Patient ID: Zion Guillen is a 70 y.o. male who  has a past medical history of Arthritis, BPH (benign prostatic hyperplasia), Hyperlipidemia, and Hypertension.    Chief Complaint: Establish Care and Hypertension (benign)     History was obtained from the patient and supplemented through chart review.  He was previously seen by Dr. Batres 6/2020 for HTN.    He is from [facility].  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  His wife is Brenda (teaches at [employer]).    HPI    HTN:    Pt's BP is controlled on Norvasc 10, losartan 25. Took meds 1-1.5 hours ago. Tolerating meds well. Pt denies CP, SOB, lightheadedness, dizziness, leg cramps.    Home BP: controlled. Arm cuff. Consistently 120/70s. 145/80 this AM.    ETOH: 1-2 drinks, 4/week  Exercise:  Walks 5 miles qAM for 40 min. Swims 1 km 3/week.    HLD:  Is currently taking Lipitor 10  Diet: No meat, chicken. Eats fish. [place] 3/year.  Eats bread, cheese. Eats veggies. Fruit daily for breakfast.  Lab Results   Component Value Date    LDLCALC 92.2 03/12/2020     The ASCVD Risk score (Randolph VIVEK Jr., et al., 2013) failed to calculate for the following reasons:    Unable to determine if patient is Non-     Vitamin D deficiency: Is taking supplements, 2000 units.  Lab Results   Component Value Date    [id number] 40 09/11/2019     Seborrheic keratosis  No change in size. No FHx skin cancer. No h/o being easily sunburned.              Not addressed today.  C scope in 5/15/2019 in [place] that was reportedly normal. Repeat in 10 years. Will no longer indicated by age.    BPH:  S/p TURP in 2018.    LBP:  Swimming helps.    B/l knee OA:  Hurts to go up and down stairs.  X-ray with mild narrowing.  H/o L meniscus repair.  Controlled with p.o. Voltaren, glucosamine chondroitin.  [facility] recommended medication management rather than surgery.    Review of Systems   Constitutional: Negative for activity change and unexpected weight change.   HENT:  Negative for hearing loss, rhinorrhea and trouble swallowing.    Eyes: Negative for discharge and visual disturbance.   Respiratory: Negative for chest tightness and wheezing.    Cardiovascular: Negative for chest pain and palpitations.   Gastrointestinal: Negative for blood in stool, constipation, diarrhea and vomiting.   Endocrine: Negative for polydipsia and polyuria.   Genitourinary: Negative for difficulty urinating, hematuria and urgency.   Musculoskeletal: Negative for arthralgias, joint swelling and neck pain.   Skin: Negative for rash and wound.   Neurological: Negative for weakness and headaches.   Hematological: Negative for adenopathy.   Psychiatric/Behavioral: Negative for confusion and dysphoric mood.         Past Medical History:   Diagnosis Date    Arthritis     BPH (benign prostatic hyperplasia)     Hyperlipidemia     Hypertension      Past Surgical History:   Procedure Laterality Date    COLONOSCOPY      CYSTOURETHROSCOPY  05/15/2019    HERNIA REPAIR  2017    umbilical, bilateral inguinal    HERNIA REPAIR      KNEE ARTHROSCOPY Left     left knee meniscus repair    PROSTATE SURGERY  10/25/2018    TRANSURETHRAL RESECTION OF PROSTATE  10/25/2018     Family History   Problem Relation Age of Onset    Uterine cancer Mother          at 78    Arthritis Mother     Coronary artery disease Father     Heart attack Father          at 72    Hypertension Father      Social History     Socioeconomic History    Marital status:      Spouse name: Not on file    Number of children: Not on file    Years of education: Not on file    Highest education level: Not on file   Occupational History    Not on file   Social Needs    Financial resource strain: Not hard at all    Food insecurity     Worry: Never true     Inability: Never true    Transportation needs     Medical: No     Non-medical: No   Tobacco Use    Smoking status: Former Smoker     Types: Cigarettes     "Smokeless tobacco: Never Used   Substance and Sexual Activity    Alcohol use: Yes     Alcohol/week: 9.0 standard drinks     Types: 6 Glasses of wine, 3 Shots of liquor per week     Frequency: 4 or more times a week     Drinks per session: 1 or 2     Binge frequency: Never     Comment: socially    Drug use: No    Sexual activity: Not on file   Lifestyle    Physical activity     Days per week: 6 days     Minutes per session: 40 min    Stress: Only a little   Relationships    Social connections     Talks on phone: More than three times a week     Gets together: Once a week     Attends Rastafari service: Not on file     Active member of club or organization: No     Attends meetings of clubs or organizations: Patient refused     Relationship status:    Other Topics Concern    Not on file   Social History Narrative    Not on file     Objective:      Vitals:    12/03/20 1056 12/03/20 1101 12/03/20 1120   BP: (!) 154/83 (!) 147/84 125/78   Pulse: 65     SpO2: 97%     Weight: 74.7 kg (164 lb 10.9 oz)     Height: 5' 6" (1.676 m)        Physical Exam  Constitutional:       General: He is not in acute distress.     Appearance: He is well-developed. He is not diaphoretic.   HENT:      Head: Normocephalic and atraumatic.      Nose: Nose normal.      Mouth/Throat:      Pharynx: No oropharyngeal exudate.   Eyes:      General: No scleral icterus.        Right eye: No discharge.         Left eye: No discharge.   Neck:      Musculoskeletal: Neck supple.      Thyroid: No thyromegaly.      Trachea: No tracheal deviation.   Cardiovascular:      Rate and Rhythm: Normal rate and regular rhythm.      Heart sounds: Normal heart sounds. No murmur.   Pulmonary:      Effort: Pulmonary effort is normal. No respiratory distress.      Breath sounds: Normal breath sounds. No wheezing.   Abdominal:      General: Bowel sounds are normal. There is no distension.      Palpations: Abdomen is soft.      Tenderness: There is no abdominal " tenderness.   Musculoskeletal:         General: No deformity.      Right lower leg: No edema.      Left lower leg: No edema.   Lymphadenopathy:      Cervical: No cervical adenopathy.   Skin:     General: Skin is warm and dry.      Findings: No erythema.      Comments: Seborrheic keratosis of left mid back, R flank. No scaling.   Neurological:      Mental Status: He is alert.      Cranial Nerves: No cranial nerve deficit.      Gait: Gait normal.   Psychiatric:         Behavior: Behavior normal.           Lab Results   Component Value Date    WBC 4.47 03/12/2020    HGB 15.1 03/12/2020    HCT 46.2 03/12/2020     03/12/2020    CHOL 161 03/12/2020    TRIG 94 03/12/2020    HDL 50 03/12/2020    ALT 21 03/12/2020    AST 25 03/12/2020     03/12/2020    K 4.0 03/12/2020     03/12/2020    CREATININE 0.8 03/12/2020    BUN 10 03/12/2020    CO2 28 03/12/2020       The ASCVD Risk score (Fredrick VIVEK Jr., et al., 2013) failed to calculate for the following reasons:    Unable to determine if patient is Non-     (Imaging have been independently reviewed)  Knee x-ray with mild narrowing.  No fracture.    Assessment:       1. Annual physical exam    2. Essential hypertension    3. Mixed hyperlipidemia    4. Vitamin D deficiency    5. Seborrheic keratosis    6. Need for pneumococcal vaccine          Plan:       Zion was seen today for establish care and hypertension.    Diagnoses and all orders for this visit:    Annual physical exam    Essential hypertension  Comments:  Controlled. Cont Norvasc 10, losartan 25. Doing well with exercise.  CMP in 6 mo.   Orders:  -     Comprehensive Metabolic Panel; Future    Mixed hyperlipidemia  Comments:  Controlled.  Continue Lipitor 10.  Doing well with diet. FLP, CMP in 6 months.  Orders:  -     Comprehensive Metabolic Panel; Future  -     CBC Auto Differential; Future  -     Lipid Panel; Future    Vitamin D deficiency  Comments:  On supplements.  Check  vitamin-D level with labs in 6 months.  Orders:  -     Vitamin D; Future    Seborrheic keratosis  Comments:  Benign lesions. This may increase with age. No Fhx skin cancer. Declines referral to Derm for TBSE.    Need for pneumococcal vaccine  Comments:  PPSV23. Advised to obtain vaccine at Pharmacy.         Health Maintenance   Topic Date Due    Pneumococcal Vaccine (65+ Low/Medium Risk) (2 of 2 - PPSV23) 09/11/2020    TETANUS VACCINE  01/01/2024    Lipid Panel  03/12/2025    Hepatitis C Screening  Completed    Abdominal Aortic Aneurysm Screening  Completed     Side effects of medication(s) were discussed in detail and patient voiced understanding.  Patient will call back for any issues or complications.     RTC in 6 month(s) or sooner PRN for HTN with labs prior.

## 2020-12-14 ENCOUNTER — OFFICE VISIT (OUTPATIENT)
Dept: INTERNAL MEDICINE | Facility: CLINIC | Age: 70
End: 2020-12-14
Payer: COMMERCIAL

## 2020-12-14 VITALS
OXYGEN SATURATION: 98 % | SYSTOLIC BLOOD PRESSURE: 145 MMHG | HEIGHT: 66 IN | BODY MASS INDEX: 26.65 KG/M2 | WEIGHT: 165.81 LBS | DIASTOLIC BLOOD PRESSURE: 91 MMHG | HEART RATE: 74 BPM

## 2020-12-14 DIAGNOSIS — R42 DIZZINESS: Primary | ICD-10-CM

## 2020-12-14 DIAGNOSIS — S39.012A STRAIN OF LUMBAR REGION, INITIAL ENCOUNTER: ICD-10-CM

## 2020-12-14 DIAGNOSIS — I10 BENIGN HYPERTENSION: ICD-10-CM

## 2020-12-14 PROCEDURE — 1101F PT FALLS ASSESS-DOCD LE1/YR: CPT | Mod: CPTII,S$GLB,, | Performed by: FAMILY MEDICINE

## 2020-12-14 PROCEDURE — 3080F PR MOST RECENT DIASTOLIC BLOOD PRESSURE >= 90 MM HG: ICD-10-PCS | Mod: CPTII,S$GLB,, | Performed by: FAMILY MEDICINE

## 2020-12-14 PROCEDURE — 3077F PR MOST RECENT SYSTOLIC BLOOD PRESSURE >= 140 MM HG: ICD-10-PCS | Mod: CPTII,S$GLB,, | Performed by: FAMILY MEDICINE

## 2020-12-14 PROCEDURE — 3080F DIAST BP >= 90 MM HG: CPT | Mod: CPTII,S$GLB,, | Performed by: FAMILY MEDICINE

## 2020-12-14 PROCEDURE — 3008F BODY MASS INDEX DOCD: CPT | Mod: CPTII,S$GLB,, | Performed by: FAMILY MEDICINE

## 2020-12-14 PROCEDURE — 99214 OFFICE O/P EST MOD 30 MIN: CPT | Mod: S$GLB,,, | Performed by: FAMILY MEDICINE

## 2020-12-14 PROCEDURE — 99999 PR PBB SHADOW E&M-EST. PATIENT-LVL IV: ICD-10-PCS | Mod: PBBFAC,,, | Performed by: FAMILY MEDICINE

## 2020-12-14 PROCEDURE — 3288F PR FALLS RISK ASSESSMENT DOCUMENTED: ICD-10-PCS | Mod: CPTII,S$GLB,, | Performed by: FAMILY MEDICINE

## 2020-12-14 PROCEDURE — 1126F AMNT PAIN NOTED NONE PRSNT: CPT | Mod: S$GLB,,, | Performed by: FAMILY MEDICINE

## 2020-12-14 PROCEDURE — 99214 PR OFFICE/OUTPT VISIT, EST, LEVL IV, 30-39 MIN: ICD-10-PCS | Mod: S$GLB,,, | Performed by: FAMILY MEDICINE

## 2020-12-14 PROCEDURE — 3008F PR BODY MASS INDEX (BMI) DOCUMENTED: ICD-10-PCS | Mod: CPTII,S$GLB,, | Performed by: FAMILY MEDICINE

## 2020-12-14 PROCEDURE — 1159F MED LIST DOCD IN RCRD: CPT | Mod: S$GLB,,, | Performed by: FAMILY MEDICINE

## 2020-12-14 PROCEDURE — 3077F SYST BP >= 140 MM HG: CPT | Mod: CPTII,S$GLB,, | Performed by: FAMILY MEDICINE

## 2020-12-14 PROCEDURE — 99999 PR PBB SHADOW E&M-EST. PATIENT-LVL IV: CPT | Mod: PBBFAC,,, | Performed by: FAMILY MEDICINE

## 2020-12-14 PROCEDURE — 1101F PR PT FALLS ASSESS DOC 0-1 FALLS W/OUT INJ PAST YR: ICD-10-PCS | Mod: CPTII,S$GLB,, | Performed by: FAMILY MEDICINE

## 2020-12-14 PROCEDURE — 3288F FALL RISK ASSESSMENT DOCD: CPT | Mod: CPTII,S$GLB,, | Performed by: FAMILY MEDICINE

## 2020-12-14 PROCEDURE — 1126F PR PAIN SEVERITY QUANTIFIED, NO PAIN PRESENT: ICD-10-PCS | Mod: S$GLB,,, | Performed by: FAMILY MEDICINE

## 2020-12-14 PROCEDURE — 1159F PR MEDICATION LIST DOCUMENTED IN MEDICAL RECORD: ICD-10-PCS | Mod: S$GLB,,, | Performed by: FAMILY MEDICINE

## 2020-12-14 RX ORDER — TIZANIDINE HYDROCHLORIDE 4 MG/1
1 CAPSULE, GELATIN COATED ORAL EVERY 6 HOURS PRN
Qty: 30 CAPSULE | Refills: 1 | Status: SHIPPED | OUTPATIENT
Start: 2020-12-14 | End: 2021-06-08 | Stop reason: ALTCHOICE

## 2020-12-14 NOTE — PATIENT INSTRUCTIONS
Understanding Dizziness, Balance Problems, and Fainting     The eyes, inner ear, joints, and muscles send signals to the brain to achieve balance.     Balance is a group effort of the eyes, inner ear, joints, and muscles. They each send signals to the brain about body position and head movement. Then the brain uses this information to achieve balance. When the brain receives conflicting signals, or when there is a problem with blood flow, dizziness or fainting can happen.  Vertigo  Vertigo is the feeling of spinning. It may happen if the brain receives conflicting balance signals. Vertigo is often caused by a problem in the inner ear. Problems include changes in inner ear structures, infection, swelling, or excess fluid. Sometimes vertigo is due to a brain problem, such as migraine.   Dysequilibrium  Dysequilibrium is the feeling of imbalance without a sense of spinning. It may happen if the signal path between the body and brain is disrupted. There are many causes of dysequilibrium, including diabetes, anemia, head injury, and aging.  Syncope  Syncope is losing consciousness or fainting. The brain needs oxygen-rich blood to function. The heart pumps that blood to the brain. If there is a problem with the heart, blood flow (such as low blood pressure), or blood vessels, faintness may happen.  Date Last Reviewed: 10/6/2015  © 8487-5270 [employer]. [address] [place] [address], [place] [address]. All rights reserved. This information is not intended as a substitute for professional medical care. Always follow your healthcare professional's instructions.

## 2020-12-14 NOTE — PROGRESS NOTES
Subjective:       Patient ID: Zion Guillen is a 70 y.o. male.    Chief Complaint:   Otitis Media (right) and Dizziness    Dizziness:   Chronicity:  New  Onset:  1 to 4 weeks ago  Progression since onset - resolved included: intermittent.  Severity:  Severe  Dizziness characteristics:  Lightheaded/impending faint, off-balance and sensation of movement  Dizziness duration: seconds to 1-2 minutes.no fever, no headaches, no tinnitus, no nausea, no vomiting, no diaphoresis, no palpitations, no facial weakness and no numbness in extremities.  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  [place] like he was going to pass out.  Had to pull over.  2 days ago while having breakfast had similar symptoms.  Sometimes when he bends over and stands back up he feels off balance.  Like he might fall.  He denies vertigo (which he states he's had before).    Back Pain  R lumbar back pain for several days.  Started after he helped cleaned the house and was bent over a lot.  No trauma.  Diclofenac not helping.  No radiation.  No numbness or tingling.  HTN  His bp has been 140/90 the last 2 days.  Normally 120's systolic when he checks it.  Review of Systems   Constitutional: Negative for diaphoresis and fever.   HENT: Negative for tinnitus.    Cardiovascular: Negative for palpitations.   Gastrointestinal: Negative for nausea and vomiting.   Neurological: Positive for dizziness. Negative for headaches.     Current Outpatient Medications   Medication Sig    amLODIPine (NORVASC) 10 MG tablet Take 1 tablet (10 mg total) by mouth once daily.    atorvastatin (LIPITOR) 10 MG tablet Take 1 tablet (10 mg total) by mouth once daily.    cetirizine (ZYRTEC) 10 MG tablet Take 10 mg by mouth once daily.    cholecalciferol, vitamin D3, (VITAMIN D3) 2,000 unit Cap     diclofenac (VOLTAREN) 75 MG EC tablet Take 1 tablet (75 mg total) by mouth 2 (two) times daily as needed.    fluticasone propionate (FLONASE) 50 mcg/actuation  "nasal spray 2 sprays (100 mcg total) by Each Nostril route once daily.    glucosamine-chondroitin 500-400 mg tablet Take 2 tablets by mouth once daily.    losartan (COZAAR) 25 MG tablet Take 1 tablet (25 mg total) by mouth once daily.    tiZANidine 4 mg Cap Take 1 capsule by mouth every 6 (six) hours as needed.     No current facility-administered medications for this visit.      Past Medical History:   Diagnosis Date    Arthritis     BPH (benign prostatic hyperplasia)     Hyperlipidemia     Hypertension      Family History   Problem Relation Age of Onset    Uterine cancer Mother          at 78    Arthritis Mother     Coronary artery disease Father     Heart attack Father          at 72    Hypertension Father      Social History     Tobacco Use    Smoking status: Former Smoker     Types: Cigarettes    Smokeless tobacco: Never Used   Substance Use Topics    Alcohol use: Yes     Alcohol/week: 9.0 standard drinks     Types: 6 Glasses of wine, 3 Shots of liquor per week     Frequency: 4 or more times a week     Drinks per session: 1 or 2     Binge frequency: Never     Comment: socially    Drug use: No       Objective:      Vitals:    20 0903   BP: (!) 145/91   Pulse: 74   SpO2: 98%   Weight: 75.2 kg (165 lb 12.6 oz)   Height: 5' 6" (1.676 m)     Physical Exam  Vitals signs and nursing note reviewed.   Constitutional:       General: He is not in acute distress.     Appearance: He is well-developed. He is not diaphoretic.   HENT:      Head: Normocephalic and atraumatic.      Right Ear: Tympanic membrane normal. No tenderness.      Left Ear: Tympanic membrane normal. No tenderness.      Mouth/Throat:      Pharynx: Uvula midline.   Eyes:      Conjunctiva/sclera: Conjunctivae normal.      Pupils: Pupils are equal, round, and reactive to light.   Neck:      Musculoskeletal: Normal range of motion and neck supple.   Cardiovascular:      Rate and Rhythm: Normal rate and regular rhythm.      Heart " sounds: Normal heart sounds. No murmur. No friction rub. No gallop.    Pulmonary:      Effort: Pulmonary effort is normal.      Breath sounds: Normal breath sounds. No wheezing or rales.   Skin:     General: Skin is warm and dry.   Neurological:      General: No focal deficit present.      Mental Status: He is alert and oriented to person, place, and time.      Cranial Nerves: Cranial nerves are intact.      Sensory: Sensation is intact.      Motor: Motor function is intact.      Coordination: Coordination is intact.      Gait: Gait is intact.   Psychiatric:         Behavior: Behavior normal.         Thought Content: Thought content normal.              Assessment and Plan:     Dizziness  -     Ambulatory referral/consult to Neurology; Future; Expected date: 12/21/2020    Strain of lumbar region, initial encounter  -     tiZANidine 4 mg Cap; Take 1 capsule by mouth every 6 (six) hours as needed.  Dispense: 30 capsule; Refill: 1    Benign hypertension    He will have the fasting labs done tomorrow that were ordered by Dr. Song on 12/3 during his annual exam.  Check bp daily for 1 week and call Dr. Song with the readings.     Follow up if symptoms worsen or fail to improve.      Jordin Guzman MD                   Statement Selected

## 2020-12-15 ENCOUNTER — PATIENT MESSAGE (OUTPATIENT)
Dept: INTERNAL MEDICINE | Facility: CLINIC | Age: 70
End: 2020-12-15

## 2020-12-15 ENCOUNTER — PATIENT OUTREACH (OUTPATIENT)
Dept: ADMINISTRATIVE | Facility: OTHER | Age: 70
End: 2020-12-15

## 2020-12-15 ENCOUNTER — LAB VISIT (OUTPATIENT)
Dept: LAB | Facility: OTHER | Age: 70
End: 2020-12-15
Attending: INTERNAL MEDICINE
Payer: COMMERCIAL

## 2020-12-15 DIAGNOSIS — E55.9 VITAMIN D DEFICIENCY: ICD-10-CM

## 2020-12-15 DIAGNOSIS — I10 ESSENTIAL HYPERTENSION: ICD-10-CM

## 2020-12-15 DIAGNOSIS — E78.2 MIXED HYPERLIPIDEMIA: ICD-10-CM

## 2020-12-15 LAB
25(OH)D3+25(OH)D2 SERPL-MCNC: 41 NG/ML (ref 30–96)
ALBUMIN SERPL BCP-MCNC: 4.4 G/DL (ref 3.5–5.2)
ALP SERPL-CCNC: 66 U/L (ref 55–135)
ALT SERPL W/O P-5'-P-CCNC: 27 U/L (ref 10–44)
ANION GAP SERPL CALC-SCNC: 11 MMOL/L (ref 8–16)
AST SERPL-CCNC: 29 U/L (ref 10–40)
BASOPHILS # BLD AUTO: 0.05 K/UL (ref 0–0.2)
BASOPHILS NFR BLD: 1.1 % (ref 0–1.9)
BILIRUB SERPL-MCNC: 1 MG/DL (ref 0.1–1)
BUN SERPL-MCNC: 10 MG/DL (ref 8–23)
CALCIUM SERPL-MCNC: 9.1 MG/DL (ref 8.7–10.5)
CHLORIDE SERPL-SCNC: 110 MMOL/L (ref 95–110)
CHOLEST SERPL-MCNC: 155 MG/DL (ref 120–199)
CHOLEST/HDLC SERPL: 3.2 {RATIO} (ref 2–5)
CO2 SERPL-SCNC: 24 MMOL/L (ref 23–29)
CREAT SERPL-MCNC: 0.8 MG/DL (ref 0.5–1.4)
DIFFERENTIAL METHOD: ABNORMAL
EOSINOPHIL # BLD AUTO: 0.3 K/UL (ref 0–0.5)
EOSINOPHIL NFR BLD: 6.4 % (ref 0–8)
ERYTHROCYTE [DISTWIDTH] IN BLOOD BY AUTOMATED COUNT: 12.5 % (ref 11.5–14.5)
EST. GFR  (AFRICAN AMERICAN): >60 ML/MIN/1.73 M^2
EST. GFR  (NON AFRICAN AMERICAN): >60 ML/MIN/1.73 M^2
GLUCOSE SERPL-MCNC: 84 MG/DL (ref 70–110)
HCT VFR BLD AUTO: 46.1 % (ref 40–54)
HDLC SERPL-MCNC: 49 MG/DL (ref 40–75)
HDLC SERPL: 31.6 % (ref 20–50)
HGB BLD-MCNC: 15.4 G/DL (ref 14–18)
IMM GRANULOCYTES # BLD AUTO: 0.03 K/UL (ref 0–0.04)
IMM GRANULOCYTES NFR BLD AUTO: 0.6 % (ref 0–0.5)
LDLC SERPL CALC-MCNC: 85.4 MG/DL (ref 63–159)
LYMPHOCYTES # BLD AUTO: 1.3 K/UL (ref 1–4.8)
LYMPHOCYTES NFR BLD: 27.9 % (ref 18–48)
MCH RBC QN AUTO: 31.6 PG (ref 27–31)
MCHC RBC AUTO-ENTMCNC: 33.4 G/DL (ref 32–36)
MCV RBC AUTO: 95 FL (ref 82–98)
MONOCYTES # BLD AUTO: 0.4 K/UL (ref 0.3–1)
MONOCYTES NFR BLD: 9.2 % (ref 4–15)
NEUTROPHILS # BLD AUTO: 2.6 K/UL (ref 1.8–7.7)
NEUTROPHILS NFR BLD: 54.8 % (ref 38–73)
NONHDLC SERPL-MCNC: 106 MG/DL
NRBC BLD-RTO: 0 /100 WBC
PLATELET # BLD AUTO: 210 K/UL (ref 150–350)
PMV BLD AUTO: 10 FL (ref 9.2–12.9)
POTASSIUM SERPL-SCNC: 4.3 MMOL/L (ref 3.5–5.1)
PROT SERPL-MCNC: 7.2 G/DL (ref 6–8.4)
RBC # BLD AUTO: 4.87 M/UL (ref 4.6–6.2)
SODIUM SERPL-SCNC: 145 MMOL/L (ref 136–145)
TRIGL SERPL-MCNC: 103 MG/DL (ref 30–150)
WBC # BLD AUTO: 4.69 K/UL (ref 3.9–12.7)

## 2020-12-15 PROCEDURE — 82306 VITAMIN D 25 HYDROXY: CPT

## 2020-12-15 PROCEDURE — 80053 COMPREHEN METABOLIC PANEL: CPT

## 2020-12-15 PROCEDURE — 36415 COLL VENOUS BLD VENIPUNCTURE: CPT

## 2020-12-15 PROCEDURE — 80061 LIPID PANEL: CPT

## 2020-12-15 PROCEDURE — 85025 COMPLETE CBC W/AUTO DIFF WBC: CPT

## 2020-12-29 ENCOUNTER — OFFICE VISIT (OUTPATIENT)
Dept: NEUROLOGY | Facility: CLINIC | Age: 70
End: 2020-12-29
Payer: COMMERCIAL

## 2020-12-29 VITALS
BODY MASS INDEX: 26.76 KG/M2 | DIASTOLIC BLOOD PRESSURE: 89 MMHG | SYSTOLIC BLOOD PRESSURE: 159 MMHG | HEIGHT: 66 IN | HEART RATE: 65 BPM

## 2020-12-29 DIAGNOSIS — R42 DIZZINESS: ICD-10-CM

## 2020-12-29 PROCEDURE — 1101F PR PT FALLS ASSESS DOC 0-1 FALLS W/OUT INJ PAST YR: ICD-10-PCS | Mod: CPTII,S$GLB,, | Performed by: PSYCHIATRY & NEUROLOGY

## 2020-12-29 PROCEDURE — 3077F PR MOST RECENT SYSTOLIC BLOOD PRESSURE >= 140 MM HG: ICD-10-PCS | Mod: CPTII,S$GLB,, | Performed by: PSYCHIATRY & NEUROLOGY

## 2020-12-29 PROCEDURE — 3288F FALL RISK ASSESSMENT DOCD: CPT | Mod: CPTII,S$GLB,, | Performed by: PSYCHIATRY & NEUROLOGY

## 2020-12-29 PROCEDURE — 1101F PT FALLS ASSESS-DOCD LE1/YR: CPT | Mod: CPTII,S$GLB,, | Performed by: PSYCHIATRY & NEUROLOGY

## 2020-12-29 PROCEDURE — 1126F AMNT PAIN NOTED NONE PRSNT: CPT | Mod: S$GLB,,, | Performed by: PSYCHIATRY & NEUROLOGY

## 2020-12-29 PROCEDURE — 3008F BODY MASS INDEX DOCD: CPT | Mod: CPTII,S$GLB,, | Performed by: PSYCHIATRY & NEUROLOGY

## 2020-12-29 PROCEDURE — 3077F SYST BP >= 140 MM HG: CPT | Mod: CPTII,S$GLB,, | Performed by: PSYCHIATRY & NEUROLOGY

## 2020-12-29 PROCEDURE — 99204 PR OFFICE/OUTPT VISIT, NEW, LEVL IV, 45-59 MIN: ICD-10-PCS | Mod: S$GLB,,, | Performed by: PSYCHIATRY & NEUROLOGY

## 2020-12-29 PROCEDURE — 1126F PR PAIN SEVERITY QUANTIFIED, NO PAIN PRESENT: ICD-10-PCS | Mod: S$GLB,,, | Performed by: PSYCHIATRY & NEUROLOGY

## 2020-12-29 PROCEDURE — 3288F PR FALLS RISK ASSESSMENT DOCUMENTED: ICD-10-PCS | Mod: CPTII,S$GLB,, | Performed by: PSYCHIATRY & NEUROLOGY

## 2020-12-29 PROCEDURE — 3079F PR MOST RECENT DIASTOLIC BLOOD PRESSURE 80-89 MM HG: ICD-10-PCS | Mod: CPTII,S$GLB,, | Performed by: PSYCHIATRY & NEUROLOGY

## 2020-12-29 PROCEDURE — 99999 PR PBB SHADOW E&M-EST. PATIENT-LVL III: CPT | Mod: PBBFAC,,, | Performed by: PSYCHIATRY & NEUROLOGY

## 2020-12-29 PROCEDURE — 1159F MED LIST DOCD IN RCRD: CPT | Mod: S$GLB,,, | Performed by: PSYCHIATRY & NEUROLOGY

## 2020-12-29 PROCEDURE — 99999 PR PBB SHADOW E&M-EST. PATIENT-LVL III: ICD-10-PCS | Mod: PBBFAC,,, | Performed by: PSYCHIATRY & NEUROLOGY

## 2020-12-29 PROCEDURE — 99204 OFFICE O/P NEW MOD 45 MIN: CPT | Mod: S$GLB,,, | Performed by: PSYCHIATRY & NEUROLOGY

## 2020-12-29 PROCEDURE — 3008F PR BODY MASS INDEX (BMI) DOCUMENTED: ICD-10-PCS | Mod: CPTII,S$GLB,, | Performed by: PSYCHIATRY & NEUROLOGY

## 2020-12-29 PROCEDURE — 3079F DIAST BP 80-89 MM HG: CPT | Mod: CPTII,S$GLB,, | Performed by: PSYCHIATRY & NEUROLOGY

## 2020-12-29 PROCEDURE — 1159F PR MEDICATION LIST DOCUMENTED IN MEDICAL RECORD: ICD-10-PCS | Mod: S$GLB,,, | Performed by: PSYCHIATRY & NEUROLOGY

## 2020-12-29 RX ORDER — MECLIZINE HYDROCHLORIDE 25 MG/1
25 TABLET ORAL 2 TIMES DAILY
Qty: 30 TABLET | Refills: 0 | Status: SHIPPED | OUTPATIENT
Start: 2020-12-29 | End: 2021-06-08 | Stop reason: ALTCHOICE

## 2020-12-29 NOTE — LETTER
December 30, 2020      Jordin Guzman MD  [address]  [facility] [address]           [facility] - Neurology 7th Fl  [address]  [facility] [address]  Phone: [phone]          Patient: Zion Guillen   MR Number: [id number]   YOB: 1950   Date of Visit: 12/29/2020       Dear Dr. Jordin Guzman:    Thank you for referring Zion Guillen to me for evaluation. Attached you will find relevant portions of my assessment and plan of care.    If you have questions, please do not hesitate to call me. I look forward to following Zion Guillen along with you.    Sincerely,    Macho Oliveira MD    Enclosure  CC:  No Recipients    If you would like to receive this communication electronically, please contact [email or web address] or [phone] to request more information on [employer] Link access.    For providers and/or their staff who would like to refer a patient to [facility], please contact us through our one-stop-shop provider referral line, [facility], at [phone].    If you feel you have received this communication in error or would no longer like to receive these types of communications, please e-mail [email or web address]

## 2020-12-29 NOTE — PROGRESS NOTES
"  Zion Guillen is a 70 y.o. year old male that  presents with complains of dizziness.  Chief Complaint   Patient presents with    Consult    Dizziness   .     HPI:  Mr Guillen has HTN, HLD, arthritis, and BPH.  He comes in today stating that few days ago he was driving early in the morning in company of his wife when suddenly his whole body became " extremely weak, like my body energy going away from me  and had to pulled over and left my wife drive". The episode lasted for only couple seconds and he denies associated HA, vertigo, double vision, confusion, visual changes, slurred speech, language impairment, visual dysfunction, diaphoresis, palpitations, shortness of breath, chest pain, tinnitus, or hearing loss.  Stated that he did well for the rest of that day but the next morning he was home sitting in a chair and once again experienced " the same type of feeling coming over again but this time it was much shorter in duration". His medications remain unchanged and he denies recent head or neck trauma.  Mr Guillen indicated that although he had had " spinning vertigo in the past", these two episodes were " quite different and very scary", and ever since started noticing that turning in bed or bending forward can provoke " a sensation that is hard to describe but is like I am losing balance".  He strongly thinks that such episodes are somehow related to R ear infections that he had in the past as well as some ear discomfort two weeks prior to these events, although at this time there is not clinical evidence of infection.          Past Medical History:   Diagnosis Date    Arthritis     BPH (benign prostatic hyperplasia)     Hyperlipidemia     Hypertension      Social History     Socioeconomic History    Marital status:      Spouse name: Not on file    Number of children: Not on file    Years of education: Not on file    Highest education level: Not on file   Occupational History    Not on file "   Social Needs    Financial resource strain: Not hard at all    Food insecurity     Worry: Never true     Inability: Never true    Transportation needs     Medical: No     Non-medical: No   Tobacco Use    Smoking status: Former Smoker     Types: Cigarettes    Smokeless tobacco: Never Used   Substance and Sexual Activity    Alcohol use: Yes     Alcohol/week: 9.0 standard drinks     Types: 6 Glasses of wine, 3 Shots of liquor per week     Frequency: 4 or more times a week     Drinks per session: 1 or 2     Binge frequency: Never     Comment: socially    Drug use: No    Sexual activity: Not on file   Lifestyle    Physical activity     Days per week: 6 days     Minutes per session: 40 min    Stress: Only a little   Relationships    Social connections     Talks on phone: More than three times a week     Gets together: Once a week     Attends Denominational service: Not on file     Active member of club or organization: No     Attends meetings of clubs or organizations: Patient refused     Relationship status:    Other Topics Concern    Not on file   Social History Narrative    Not on file     Past Surgical History:   Procedure Laterality Date    COLONOSCOPY  2006    CYSTOURETHROSCOPY  05/15/2019    HERNIA REPAIR  2017    umbilical, bilateral inguinal    HERNIA REPAIR      KNEE ARTHROSCOPY Left     left knee meniscus repair    PROSTATE SURGERY  10/25/2018    TRANSURETHRAL RESECTION OF PROSTATE  10/25/2018     Family History   Problem Relation Age of Onset    Uterine cancer Mother          at 78    Arthritis Mother     Coronary artery disease Father     Heart attack Father          at 72    Hypertension Father            Review of Systems  General ROS: negative for chills, fever or weight loss  Psychological ROS: negative for hallucination, depression or suicidal ideation  Ophthalmic ROS: negative for blurry vision, photophobia or eye pain  ENT ROS: negative for epistaxis,  "sore throat or rhinorrhea  Respiratory ROS: no cough, shortness of breath, or wheezing  Cardiovascular ROS: no chest pain or dyspnea on exertion  Gastrointestinal ROS: no abdominal pain, change in bowel habits, or black/ bloody stools  Genito-Urinary ROS: no dysuria, trouble voiding, or hematuria  Musculoskeletal ROS: negative for gait disturbance or muscular weakness  Neurological ROS: no syncope or seizures; no ataxia  Dermatological ROS: negative for pruritis, rash and jaundice      Physical Exam:  BP (!) 159/89 (BP Location: Left arm, Patient Position: Sitting)   Pulse 65   Ht 5' 6" (1.676 m)   BMI 26.76 kg/m²   General appearance: alert, cooperative, no distress  Constitutional:Oriented to person, place, and time.appears well-developed and well-nourished.   HEENT: Normocephalic, atraumatic, neck symmetrical, no nasal discharge   Eyes: conjunctivae/corneas clear, PERRL, EOM's intact  Lungs: clear to auscultation bilaterally, no dullness to percussion bilaterally  Heart: regular rate and rhythm without rub; no displacement of the PMI   Abdomen: soft, non-tender; bowel sounds normoactive; no organomegaly  Extremities: extremities symmetric; no clubbing, cyanosis, or edema  Integument: Skin color, texture, turgor normal; no rashes; hair distrubution normal  Neurologic:   Mental status: alert and awake, oriented x 4, comprehension, naming, and repetition intact. No right to left confusion. Performs serial 7's without difficulty .No dysarthria.  CN 2-12: pupils 4 mm bilaterally, reactive to light. Fundi without papilledema. Visual fields full to confrontation. EOM full without nystagmus. Face sensation normal in all distributions. Face symmetric. Hearing grossly intact. Palate elevates well. Tongue midline without atrophy or fasciculations.  Motor: 5/5 all over  Sensory: intact in all modalities.  DTR's: 2+ all over.  Plantars: no tested.  Coordination: finger to nose and heel-knee-shin intact.  Gait: no ataxia or " "bradykinesia    LABS:    Complete Blood Count  Lab Results   Component Value Date    RBC 4.87 12/15/2020    HGB 15.4 12/15/2020    HCT 46.1 12/15/2020    MCV 95 12/15/2020    MCH 31.6 (H) 12/15/2020    MCHC 33.4 12/15/2020    RDW 12.5 12/15/2020     12/15/2020    MPV 10.0 12/15/2020    GRAN 2.6 12/15/2020    GRAN 54.8 12/15/2020    LYMPH 1.3 12/15/2020    LYMPH 27.9 12/15/2020    MONO 0.4 12/15/2020    MONO 9.2 12/15/2020    EOS 0.3 12/15/2020    BASO 0.05 12/15/2020    EOSINOPHIL 6.4 12/15/2020    BASOPHIL 1.1 12/15/2020    DIFFMETHOD Automated 12/15/2020       Comprehensive Metabolic Panel  Lab Results   Component Value Date    GLU 84 12/15/2020    BUN 10 12/15/2020    CREATININE 0.8 12/15/2020     12/15/2020    K 4.3 12/15/2020     12/15/2020    PROT 7.2 12/15/2020    ALBUMIN 4.4 12/15/2020    BILITOT 1.0 12/15/2020    AST 29 12/15/2020    ALKPHOS 66 12/15/2020    CO2 24 12/15/2020    ALT 27 12/15/2020    ANIONGAP 11 12/15/2020    EGFRNONAA >60 12/15/2020    ESTGFRAFRICA >60 12/15/2020       TSH  No results found for: TSH      Assessment: 69 y/o with HTN, HLD, arthritis, and BPH, presents for evaluation of new onset episodes of transient generalized weakness and sensation of imbalance when turning in bed or bending forward. Of note, although he had had vertigo in the past, he describes no true vertigo associated with these recent episodes.  Neuro exam is unremarkable.  His symptoms are hard to localize, thus suggested pursuing brain imaging to better elucidate his symptoms. He is concerned with the cost associated with MRI or CT head and prefers to try " a medication that could help with these symptoms" and then decide about testing.  Will give him a trial of meclizine but I stressed the fact that I am no quite convinced that his symptoms are vertiginous in nature, thus advised him to contact me with any new developments or lack of benefit while using meclizine.      ICD-10-CM ICD-9-CM    1. " Dizziness  R42 780.4 Ambulatory referral/consult to Neurology     The encounter diagnosis was Dizziness.      Plan:  1) Transient neurological symptoms: as above  2) HTN  3) HLD  4) Arthritis  5) BPH    No orders of the defined types were placed in this encounter.          Macho Oliveira MD

## 2021-01-08 ENCOUNTER — IMMUNIZATION (OUTPATIENT)
Dept: INTERNAL MEDICINE | Facility: CLINIC | Age: 71
End: 2021-01-08
Payer: MEDICARE

## 2021-01-08 DIAGNOSIS — Z23 NEED FOR VACCINATION: ICD-10-CM

## 2021-01-08 PROCEDURE — 91300 COVID-19, MRNA, LNP-S, PF, 30 MCG/0.3 ML DOSE VACCINE: CPT | Mod: PBBFAC | Performed by: INTERNAL MEDICINE

## 2021-01-18 ENCOUNTER — PATIENT MESSAGE (OUTPATIENT)
Dept: NEUROLOGY | Facility: CLINIC | Age: 71
End: 2021-01-18

## 2021-01-19 DIAGNOSIS — R42 DIZZINESS AND GIDDINESS: ICD-10-CM

## 2021-01-20 ENCOUNTER — PATIENT MESSAGE (OUTPATIENT)
Dept: NEUROLOGY | Facility: CLINIC | Age: 71
End: 2021-01-20

## 2021-01-21 ENCOUNTER — PATIENT MESSAGE (OUTPATIENT)
Dept: NEUROLOGY | Facility: CLINIC | Age: 71
End: 2021-01-21

## 2021-01-29 ENCOUNTER — HOSPITAL ENCOUNTER (OUTPATIENT)
Dept: RADIOLOGY | Facility: HOSPITAL | Age: 71
Discharge: HOME | End: 2021-01-29
Attending: PSYCHIATRY & NEUROLOGY
Payer: MEDICARE

## 2021-01-29 ENCOUNTER — IMMUNIZATION (OUTPATIENT)
Dept: INTERNAL MEDICINE | Facility: CLINIC | Age: 71
End: 2021-01-29
Payer: MEDICARE

## 2021-01-29 DIAGNOSIS — Z23 NEED FOR VACCINATION: Primary | ICD-10-CM

## 2021-01-29 DIAGNOSIS — R42 DIZZINESS AND GIDDINESS: ICD-10-CM

## 2021-01-29 PROCEDURE — 91300 COVID-19, MRNA, LNP-S, PF, 30 MCG/0.3 ML DOSE VACCINE: CPT | Mod: PBBFAC | Performed by: INTERNAL MEDICINE

## 2021-01-29 PROCEDURE — 0002A COVID-19, MRNA, LNP-S, PF, 30 MCG/0.3 ML DOSE VACCINE: CPT | Mod: PBBFAC | Performed by: INTERNAL MEDICINE

## 2021-01-29 PROCEDURE — 70544 MRA BRAIN WITHOUT CONTRAST: ICD-10-PCS | Mod: 26,59,, | Performed by: RADIOLOGY

## 2021-01-29 PROCEDURE — 70551 MRI BRAIN STEM W/O DYE: CPT | Mod: TC

## 2021-01-29 PROCEDURE — 70551 MRI BRAIN WITHOUT CONTRAST: ICD-10-PCS | Mod: 26,,, | Performed by: RADIOLOGY

## 2021-01-29 PROCEDURE — 70544 MR ANGIOGRAPHY HEAD W/O DYE: CPT | Mod: 26,59,, | Performed by: RADIOLOGY

## 2021-01-29 PROCEDURE — 70544 MR ANGIOGRAPHY HEAD W/O DYE: CPT | Mod: TC,59

## 2021-01-29 PROCEDURE — 70551 MRI BRAIN STEM W/O DYE: CPT | Mod: 26,,, | Performed by: RADIOLOGY

## 2021-02-02 ENCOUNTER — TELEPHONE (OUTPATIENT)
Dept: NEUROLOGY | Facility: CLINIC | Age: 71
End: 2021-02-02

## 2021-02-04 ENCOUNTER — TELEPHONE (OUTPATIENT)
Dept: NEUROLOGY | Facility: CLINIC | Age: 71
End: 2021-02-04

## 2021-03-18 ENCOUNTER — PATIENT MESSAGE (OUTPATIENT)
Dept: RESEARCH | Facility: HOSPITAL | Age: 71
End: 2021-03-18

## 2021-03-26 ENCOUNTER — PATIENT MESSAGE (OUTPATIENT)
Dept: RESEARCH | Facility: HOSPITAL | Age: 71
End: 2021-03-26

## 2021-05-17 ENCOUNTER — TELEPHONE (OUTPATIENT)
Dept: AUDIOLOGY | Facility: CLINIC | Age: 71
End: 2021-05-17

## 2021-05-17 DIAGNOSIS — H90.3 SENSORINEURAL HEARING LOSS, BILATERAL: Primary | ICD-10-CM

## 2021-05-25 ENCOUNTER — CLINICAL SUPPORT (OUTPATIENT)
Dept: AUDIOLOGY | Facility: CLINIC | Age: 71
End: 2021-05-25
Payer: MEDICARE

## 2021-05-25 DIAGNOSIS — H90.3 SENSORINEURAL HEARING LOSS, BILATERAL: ICD-10-CM

## 2021-05-25 DIAGNOSIS — H90.3 SENSORINEURAL HEARING LOSS, BILATERAL: Primary | ICD-10-CM

## 2021-05-25 PROCEDURE — 92557 COMPREHENSIVE HEARING TEST: CPT | Mod: PBBFAC | Performed by: AUDIOLOGIST

## 2021-05-25 PROCEDURE — 99999 PR PBB SHADOW E&M-EST. PATIENT-LVL I: CPT | Mod: PBBFAC,,, | Performed by: AUDIOLOGIST

## 2021-05-25 PROCEDURE — 99999 PR PBB SHADOW E&M-EST. PATIENT-LVL I: ICD-10-PCS | Mod: PBBFAC,,, | Performed by: AUDIOLOGIST

## 2021-05-25 PROCEDURE — 99499 NO LOS: ICD-10-PCS | Mod: S$PBB,,, | Performed by: AUDIOLOGIST

## 2021-05-25 PROCEDURE — 99211 OFF/OP EST MAY X REQ PHY/QHP: CPT | Mod: PBBFAC,25 | Performed by: AUDIOLOGIST

## 2021-05-25 PROCEDURE — 99499 UNLISTED E&M SERVICE: CPT | Mod: S$PBB,,, | Performed by: AUDIOLOGIST

## 2021-05-25 PROCEDURE — 92567 TYMPANOMETRY: CPT | Mod: PBBFAC | Performed by: AUDIOLOGIST

## 2021-06-08 ENCOUNTER — OFFICE VISIT (OUTPATIENT)
Dept: URGENT CARE | Facility: CLINIC | Age: 71
End: 2021-06-08
Payer: MEDICARE

## 2021-06-08 VITALS
TEMPERATURE: 98 F | WEIGHT: 150 LBS | HEIGHT: 66 IN | SYSTOLIC BLOOD PRESSURE: 161 MMHG | OXYGEN SATURATION: 97 % | DIASTOLIC BLOOD PRESSURE: 81 MMHG | RESPIRATION RATE: 17 BRPM | HEART RATE: 61 BPM | BODY MASS INDEX: 24.11 KG/M2

## 2021-06-08 DIAGNOSIS — J01.80 ACUTE NON-RECURRENT SINUSITIS OF OTHER SINUS: Primary | ICD-10-CM

## 2021-06-08 DIAGNOSIS — I10 ESSENTIAL HYPERTENSION: ICD-10-CM

## 2021-06-08 PROCEDURE — 99499 UNLISTED E&M SERVICE: CPT | Mod: S$GLB,,, | Performed by: FAMILY MEDICINE

## 2021-06-08 PROCEDURE — 99214 OFFICE O/P EST MOD 30 MIN: CPT | Mod: S$GLB,,, | Performed by: FAMILY MEDICINE

## 2021-06-08 PROCEDURE — 99214 PR OFFICE/OUTPT VISIT, EST, LEVL IV, 30-39 MIN: ICD-10-PCS | Mod: S$GLB,,, | Performed by: FAMILY MEDICINE

## 2021-06-08 PROCEDURE — 99499 RISK ADDL DX/OHS AUDIT: ICD-10-PCS | Mod: S$GLB,,, | Performed by: FAMILY MEDICINE

## 2021-06-08 RX ORDER — AMOXICILLIN AND CLAVULANATE POTASSIUM 875; 125 MG/1; MG/1
1 TABLET, FILM COATED ORAL 2 TIMES DAILY
Qty: 14 TABLET | Refills: 0 | Status: SHIPPED | OUTPATIENT
Start: 2021-06-08 | End: 2021-06-15

## 2021-06-11 DIAGNOSIS — J30.9 CHRONIC ALLERGIC RHINITIS: ICD-10-CM

## 2021-06-11 RX ORDER — FLUTICASONE PROPIONATE 50 MCG
1 SPRAY, SUSPENSION (ML) NASAL DAILY
Qty: 16 G | Refills: 11 | Status: SHIPPED | OUTPATIENT
Start: 2021-06-11 | End: 2022-12-16

## 2021-07-12 ENCOUNTER — PATIENT MESSAGE (OUTPATIENT)
Dept: INTERNAL MEDICINE | Facility: CLINIC | Age: 71
End: 2021-07-12

## 2021-07-12 DIAGNOSIS — I10 BENIGN HYPERTENSION: ICD-10-CM

## 2021-07-12 RX ORDER — AMLODIPINE BESYLATE 10 MG/1
10 TABLET ORAL DAILY
Qty: 90 TABLET | Refills: 3 | Status: SHIPPED | OUTPATIENT
Start: 2021-07-12 | End: 2021-10-13 | Stop reason: SDUPTHER

## 2021-07-27 ENCOUNTER — TELEPHONE (OUTPATIENT)
Dept: INTERNAL MEDICINE | Facility: CLINIC | Age: 71
End: 2021-07-27

## 2021-07-27 ENCOUNTER — OFFICE VISIT (OUTPATIENT)
Dept: INTERNAL MEDICINE | Facility: CLINIC | Age: 71
End: 2021-07-27
Payer: MEDICARE

## 2021-07-27 VITALS
HEART RATE: 68 BPM | WEIGHT: 162.06 LBS | SYSTOLIC BLOOD PRESSURE: 143 MMHG | DIASTOLIC BLOOD PRESSURE: 90 MMHG | BODY MASS INDEX: 26.05 KG/M2 | OXYGEN SATURATION: 96 % | HEIGHT: 66 IN

## 2021-07-27 DIAGNOSIS — Z00.00 ANNUAL PHYSICAL EXAM: ICD-10-CM

## 2021-07-27 DIAGNOSIS — I70.0 AORTIC ATHEROSCLEROSIS: ICD-10-CM

## 2021-07-27 DIAGNOSIS — E78.2 MIXED HYPERLIPIDEMIA: ICD-10-CM

## 2021-07-27 DIAGNOSIS — I10 ESSENTIAL HYPERTENSION: ICD-10-CM

## 2021-07-27 DIAGNOSIS — E55.9 VITAMIN D DEFICIENCY: ICD-10-CM

## 2021-07-27 DIAGNOSIS — I95.1 ORTHOSTASIS: Primary | ICD-10-CM

## 2021-07-27 DIAGNOSIS — M17.0 BILATERAL PRIMARY OSTEOARTHRITIS OF KNEE: ICD-10-CM

## 2021-07-27 PROCEDURE — 99499 RISK ADDL DX/OHS AUDIT: ICD-10-PCS | Mod: HCNC,S$GLB,, | Performed by: INTERNAL MEDICINE

## 2021-07-27 PROCEDURE — 99215 PR OFFICE/OUTPT VISIT, EST, LEVL V, 40-54 MIN: ICD-10-PCS | Mod: S$GLB,,, | Performed by: INTERNAL MEDICINE

## 2021-07-27 PROCEDURE — 99999 PR PBB SHADOW E&M-EST. PATIENT-LVL III: ICD-10-PCS | Mod: PBBFAC,,, | Performed by: INTERNAL MEDICINE

## 2021-07-27 PROCEDURE — 99499 UNLISTED E&M SERVICE: CPT | Mod: HCNC,S$GLB,, | Performed by: INTERNAL MEDICINE

## 2021-07-27 PROCEDURE — 99999 PR PBB SHADOW E&M-EST. PATIENT-LVL III: CPT | Mod: PBBFAC,,, | Performed by: INTERNAL MEDICINE

## 2021-07-27 PROCEDURE — 99213 OFFICE O/P EST LOW 20 MIN: CPT | Mod: PBBFAC | Performed by: INTERNAL MEDICINE

## 2021-07-27 PROCEDURE — 99215 OFFICE O/P EST HI 40 MIN: CPT | Mod: S$GLB,,, | Performed by: INTERNAL MEDICINE

## 2021-07-27 RX ORDER — LOSARTAN POTASSIUM 25 MG/1
25 TABLET ORAL DAILY
Qty: 90 TABLET | Refills: 3 | Status: SHIPPED | OUTPATIENT
Start: 2021-07-27 | End: 2021-11-26 | Stop reason: SDUPTHER

## 2021-07-27 RX ORDER — ATORVASTATIN CALCIUM 10 MG/1
10 TABLET, FILM COATED ORAL DAILY
Qty: 90 TABLET | Refills: 3 | Status: SHIPPED | OUTPATIENT
Start: 2021-07-27 | End: 2021-12-24 | Stop reason: SDUPTHER

## 2021-08-26 ENCOUNTER — PATIENT MESSAGE (OUTPATIENT)
Dept: INTERNAL MEDICINE | Facility: CLINIC | Age: 71
End: 2021-08-26

## 2021-08-26 DIAGNOSIS — I10 ESSENTIAL HYPERTENSION: ICD-10-CM

## 2021-08-26 RX ORDER — LOSARTAN POTASSIUM 25 MG/1
25 TABLET ORAL DAILY
Qty: 90 TABLET | Refills: 3 | Status: CANCELLED | OUTPATIENT
Start: 2021-08-26

## 2021-09-24 DIAGNOSIS — M25.561 CHRONIC PAIN OF BOTH KNEES: ICD-10-CM

## 2021-09-24 DIAGNOSIS — G89.29 CHRONIC PAIN OF BOTH KNEES: ICD-10-CM

## 2021-09-24 DIAGNOSIS — M25.562 CHRONIC PAIN OF BOTH KNEES: ICD-10-CM

## 2021-09-24 RX ORDER — DICLOFENAC SODIUM 75 MG/1
75 TABLET, DELAYED RELEASE ORAL 2 TIMES DAILY PRN
Qty: 30 TABLET | Refills: 3 | Status: SHIPPED | OUTPATIENT
Start: 2021-09-24 | End: 2021-11-26 | Stop reason: SDUPTHER

## 2021-09-28 ENCOUNTER — IMMUNIZATION (OUTPATIENT)
Dept: INTERNAL MEDICINE | Facility: CLINIC | Age: 71
End: 2021-09-28
Payer: MEDICARE

## 2021-09-28 DIAGNOSIS — Z23 NEED FOR VACCINATION: Primary | ICD-10-CM

## 2021-09-28 PROCEDURE — 0003A COVID-19, MRNA, LNP-S, PF, 30 MCG/0.3 ML DOSE VACCINE: CPT | Mod: HCNC,PBBFAC | Performed by: INTERNAL MEDICINE

## 2021-09-28 PROCEDURE — 91300 COVID-19, MRNA, LNP-S, PF, 30 MCG/0.3 ML DOSE VACCINE: CPT | Mod: HCNC,PBBFAC | Performed by: INTERNAL MEDICINE

## 2021-10-19 ENCOUNTER — PATIENT MESSAGE (OUTPATIENT)
Dept: INTERNAL MEDICINE | Facility: CLINIC | Age: 71
End: 2021-10-19
Payer: MEDICARE

## 2021-12-24 DIAGNOSIS — E78.2 MIXED HYPERLIPIDEMIA: ICD-10-CM

## 2021-12-27 DIAGNOSIS — E78.2 MIXED HYPERLIPIDEMIA: ICD-10-CM

## 2021-12-27 RX ORDER — ATORVASTATIN CALCIUM 10 MG/1
10 TABLET, FILM COATED ORAL DAILY
Qty: 90 TABLET | Refills: 3 | OUTPATIENT
Start: 2021-12-27

## 2021-12-27 RX ORDER — INFLUENZA VACCINE, ADJUVANTED 15; 15; 15; 15 UG/.5ML; UG/.5ML; UG/.5ML; UG/.5ML
INJECTION, SUSPENSION INTRAMUSCULAR
COMMUNITY
Start: 2021-10-06 | End: 2022-01-27

## 2021-12-27 RX ORDER — ATORVASTATIN CALCIUM 10 MG/1
10 TABLET, FILM COATED ORAL DAILY
Qty: 90 TABLET | Refills: 3 | Status: SHIPPED | OUTPATIENT
Start: 2021-12-27 | End: 2022-01-27 | Stop reason: SDUPTHER

## 2022-01-16 ENCOUNTER — PATIENT MESSAGE (OUTPATIENT)
Dept: AUDIOLOGY | Facility: CLINIC | Age: 72
End: 2022-01-16
Payer: MEDICARE

## 2022-01-16 DIAGNOSIS — I10 BENIGN HYPERTENSION: ICD-10-CM

## 2022-01-16 DIAGNOSIS — M25.562 CHRONIC PAIN OF BOTH KNEES: ICD-10-CM

## 2022-01-16 DIAGNOSIS — M25.561 CHRONIC PAIN OF BOTH KNEES: ICD-10-CM

## 2022-01-16 DIAGNOSIS — G89.29 CHRONIC PAIN OF BOTH KNEES: ICD-10-CM

## 2022-01-16 NOTE — TELEPHONE ENCOUNTER
No new care gaps identified.  Powered by [employer]. Reference number: [id number].   1/16/2022 4:58:24 PM CST

## 2022-01-18 RX ORDER — DICLOFENAC SODIUM 75 MG/1
75 TABLET, DELAYED RELEASE ORAL 2 TIMES DAILY PRN
Qty: 30 TABLET | Refills: 0 | Status: SHIPPED | OUTPATIENT
Start: 2022-01-18 | End: 2022-05-01 | Stop reason: SDUPTHER

## 2022-01-18 RX ORDER — AMLODIPINE BESYLATE 10 MG/1
10 TABLET ORAL DAILY
Qty: 90 TABLET | Refills: 3 | Status: SHIPPED | OUTPATIENT
Start: 2022-01-18 | End: 2022-07-18 | Stop reason: SDUPTHER

## 2022-01-24 ENCOUNTER — LAB VISIT (OUTPATIENT)
Dept: LAB | Facility: OTHER | Age: 72
End: 2022-01-24
Attending: INTERNAL MEDICINE
Payer: MEDICARE

## 2022-01-24 DIAGNOSIS — E55.9 VITAMIN D DEFICIENCY: ICD-10-CM

## 2022-01-24 DIAGNOSIS — Z00.00 ANNUAL PHYSICAL EXAM: ICD-10-CM

## 2022-01-24 DIAGNOSIS — I10 ESSENTIAL HYPERTENSION: ICD-10-CM

## 2022-01-24 DIAGNOSIS — E78.2 MIXED HYPERLIPIDEMIA: ICD-10-CM

## 2022-01-24 LAB
25(OH)D3+25(OH)D2 SERPL-MCNC: 49 NG/ML (ref 30–96)
ALBUMIN SERPL BCP-MCNC: 4.5 G/DL (ref 3.5–5.2)
ALP SERPL-CCNC: 69 U/L (ref 55–135)
ALT SERPL W/O P-5'-P-CCNC: 31 U/L (ref 10–44)
ANION GAP SERPL CALC-SCNC: 9 MMOL/L (ref 8–16)
AST SERPL-CCNC: 32 U/L (ref 10–40)
BASOPHILS # BLD AUTO: 0.05 K/UL (ref 0–0.2)
BASOPHILS NFR BLD: 1 % (ref 0–1.9)
BILIRUB SERPL-MCNC: 1.5 MG/DL (ref 0.1–1)
BUN SERPL-MCNC: 10 MG/DL (ref 8–23)
CALCIUM SERPL-MCNC: 9.8 MG/DL (ref 8.7–10.5)
CHLORIDE SERPL-SCNC: 110 MMOL/L (ref 95–110)
CHOLEST SERPL-MCNC: 173 MG/DL (ref 120–199)
CHOLEST/HDLC SERPL: 3.4 {RATIO} (ref 2–5)
CO2 SERPL-SCNC: 27 MMOL/L (ref 23–29)
CREAT SERPL-MCNC: 0.9 MG/DL (ref 0.5–1.4)
DIFFERENTIAL METHOD: ABNORMAL
EOSINOPHIL # BLD AUTO: 0.5 K/UL (ref 0–0.5)
EOSINOPHIL NFR BLD: 9.1 % (ref 0–8)
ERYTHROCYTE [DISTWIDTH] IN BLOOD BY AUTOMATED COUNT: 12.5 % (ref 11.5–14.5)
EST. GFR  (AFRICAN AMERICAN): >60 ML/MIN/1.73 M^2
EST. GFR  (NON AFRICAN AMERICAN): >60 ML/MIN/1.73 M^2
GLUCOSE SERPL-MCNC: 97 MG/DL (ref 70–110)
HCT VFR BLD AUTO: 47 % (ref 40–54)
HDLC SERPL-MCNC: 51 MG/DL (ref 40–75)
HDLC SERPL: 29.5 % (ref 20–50)
HGB BLD-MCNC: 15.9 G/DL (ref 14–18)
IMM GRANULOCYTES # BLD AUTO: 0.02 K/UL (ref 0–0.04)
IMM GRANULOCYTES NFR BLD AUTO: 0.4 % (ref 0–0.5)
LDLC SERPL CALC-MCNC: 97.6 MG/DL (ref 63–159)
LYMPHOCYTES # BLD AUTO: 1.7 K/UL (ref 1–4.8)
LYMPHOCYTES NFR BLD: 33 % (ref 18–48)
MCH RBC QN AUTO: 32.8 PG (ref 27–31)
MCHC RBC AUTO-ENTMCNC: 33.8 G/DL (ref 32–36)
MCV RBC AUTO: 97 FL (ref 82–98)
MONOCYTES # BLD AUTO: 0.5 K/UL (ref 0.3–1)
MONOCYTES NFR BLD: 8.9 % (ref 4–15)
NEUTROPHILS # BLD AUTO: 2.5 K/UL (ref 1.8–7.7)
NEUTROPHILS NFR BLD: 47.6 % (ref 38–73)
NONHDLC SERPL-MCNC: 122 MG/DL
NRBC BLD-RTO: 0 /100 WBC
PLATELET # BLD AUTO: 220 K/UL (ref 150–450)
PMV BLD AUTO: 10.1 FL (ref 9.2–12.9)
POTASSIUM SERPL-SCNC: 5 MMOL/L (ref 3.5–5.1)
PROT SERPL-MCNC: 7.2 G/DL (ref 6–8.4)
RBC # BLD AUTO: 4.85 M/UL (ref 4.6–6.2)
SODIUM SERPL-SCNC: 146 MMOL/L (ref 136–145)
TRIGL SERPL-MCNC: 122 MG/DL (ref 30–150)
WBC # BLD AUTO: 5.15 K/UL (ref 3.9–12.7)

## 2022-01-24 PROCEDURE — 85025 COMPLETE CBC W/AUTO DIFF WBC: CPT | Mod: HCNC | Performed by: INTERNAL MEDICINE

## 2022-01-24 PROCEDURE — 80061 LIPID PANEL: CPT | Mod: HCNC | Performed by: INTERNAL MEDICINE

## 2022-01-24 PROCEDURE — 80053 COMPREHEN METABOLIC PANEL: CPT | Mod: HCNC | Performed by: INTERNAL MEDICINE

## 2022-01-24 PROCEDURE — 82306 VITAMIN D 25 HYDROXY: CPT | Mod: HCNC | Performed by: INTERNAL MEDICINE

## 2022-01-24 PROCEDURE — 36415 COLL VENOUS BLD VENIPUNCTURE: CPT | Mod: HCNC | Performed by: INTERNAL MEDICINE

## 2022-01-27 ENCOUNTER — PATIENT MESSAGE (OUTPATIENT)
Dept: ORTHOPEDICS | Facility: CLINIC | Age: 72
End: 2022-01-27
Payer: MEDICARE

## 2022-01-27 ENCOUNTER — TELEPHONE (OUTPATIENT)
Dept: ORTHOPEDICS | Facility: CLINIC | Age: 72
End: 2022-01-27
Payer: MEDICARE

## 2022-01-27 ENCOUNTER — OFFICE VISIT (OUTPATIENT)
Dept: INTERNAL MEDICINE | Facility: CLINIC | Age: 72
End: 2022-01-27
Payer: MEDICARE

## 2022-01-27 VITALS
BODY MASS INDEX: 26.26 KG/M2 | OXYGEN SATURATION: 98 % | HEIGHT: 66 IN | WEIGHT: 163.38 LBS | SYSTOLIC BLOOD PRESSURE: 110 MMHG | DIASTOLIC BLOOD PRESSURE: 76 MMHG | HEART RATE: 68 BPM

## 2022-01-27 DIAGNOSIS — E87.0 HYPERNATREMIA: ICD-10-CM

## 2022-01-27 DIAGNOSIS — I10 ESSENTIAL HYPERTENSION: ICD-10-CM

## 2022-01-27 DIAGNOSIS — M25.532 LEFT WRIST PAIN: ICD-10-CM

## 2022-01-27 DIAGNOSIS — G89.29 CHRONIC LOW BACK PAIN, UNSPECIFIED BACK PAIN LATERALITY, UNSPECIFIED WHETHER SCIATICA PRESENT: ICD-10-CM

## 2022-01-27 DIAGNOSIS — I70.0 AORTIC ATHEROSCLEROSIS: ICD-10-CM

## 2022-01-27 DIAGNOSIS — M17.0 BILATERAL PRIMARY OSTEOARTHRITIS OF KNEE: Primary | ICD-10-CM

## 2022-01-27 DIAGNOSIS — M54.50 CHRONIC LOW BACK PAIN, UNSPECIFIED BACK PAIN LATERALITY, UNSPECIFIED WHETHER SCIATICA PRESENT: ICD-10-CM

## 2022-01-27 DIAGNOSIS — L82.1 SEBORRHEIC KERATOSIS: ICD-10-CM

## 2022-01-27 DIAGNOSIS — E55.9 VITAMIN D DEFICIENCY: ICD-10-CM

## 2022-01-27 PROCEDURE — 1159F PR MEDICATION LIST DOCUMENTED IN MEDICAL RECORD: ICD-10-PCS | Mod: HCNC,CPTII,S$GLB, | Performed by: INTERNAL MEDICINE

## 2022-01-27 PROCEDURE — 3078F PR MOST RECENT DIASTOLIC BLOOD PRESSURE < 80 MM HG: ICD-10-PCS | Mod: HCNC,CPTII,S$GLB, | Performed by: INTERNAL MEDICINE

## 2022-01-27 PROCEDURE — 3074F SYST BP LT 130 MM HG: CPT | Mod: HCNC,CPTII,S$GLB, | Performed by: INTERNAL MEDICINE

## 2022-01-27 PROCEDURE — 99417 PR PROLONGED SVC, OUTPT, W/WO DIRECT PT CONTACT,  EA ADDTL 15 MIN: ICD-10-PCS | Mod: HCNC,S$GLB,, | Performed by: INTERNAL MEDICINE

## 2022-01-27 PROCEDURE — 3288F PR FALLS RISK ASSESSMENT DOCUMENTED: ICD-10-PCS | Mod: HCNC,CPTII,S$GLB, | Performed by: INTERNAL MEDICINE

## 2022-01-27 PROCEDURE — 99499 UNLISTED E&M SERVICE: CPT | Mod: HCNC,S$GLB,, | Performed by: INTERNAL MEDICINE

## 2022-01-27 PROCEDURE — 99499 RISK ADDL DX/OHS AUDIT: ICD-10-PCS | Mod: HCNC,S$GLB,, | Performed by: INTERNAL MEDICINE

## 2022-01-27 PROCEDURE — 99215 PR OFFICE/OUTPT VISIT, EST, LEVL V, 40-54 MIN: ICD-10-PCS | Mod: HCNC,S$GLB,, | Performed by: INTERNAL MEDICINE

## 2022-01-27 PROCEDURE — 1160F PR REVIEW ALL MEDS BY PRESCRIBER/CLIN PHARMACIST DOCUMENTED: ICD-10-PCS | Mod: HCNC,CPTII,S$GLB, | Performed by: INTERNAL MEDICINE

## 2022-01-27 PROCEDURE — 99999 PR PBB SHADOW E&M-EST. PATIENT-LVL IV: ICD-10-PCS | Mod: PBBFAC,HCNC,, | Performed by: INTERNAL MEDICINE

## 2022-01-27 PROCEDURE — 99999 PR PBB SHADOW E&M-EST. PATIENT-LVL IV: CPT | Mod: PBBFAC,HCNC,, | Performed by: INTERNAL MEDICINE

## 2022-01-27 PROCEDURE — 99215 OFFICE O/P EST HI 40 MIN: CPT | Mod: HCNC,S$GLB,, | Performed by: INTERNAL MEDICINE

## 2022-01-27 PROCEDURE — 1101F PT FALLS ASSESS-DOCD LE1/YR: CPT | Mod: HCNC,CPTII,S$GLB, | Performed by: INTERNAL MEDICINE

## 2022-01-27 PROCEDURE — 3008F BODY MASS INDEX DOCD: CPT | Mod: HCNC,CPTII,S$GLB, | Performed by: INTERNAL MEDICINE

## 2022-01-27 PROCEDURE — 3078F DIAST BP <80 MM HG: CPT | Mod: HCNC,CPTII,S$GLB, | Performed by: INTERNAL MEDICINE

## 2022-01-27 PROCEDURE — 1101F PR PT FALLS ASSESS DOC 0-1 FALLS W/OUT INJ PAST YR: ICD-10-PCS | Mod: HCNC,CPTII,S$GLB, | Performed by: INTERNAL MEDICINE

## 2022-01-27 PROCEDURE — 3288F FALL RISK ASSESSMENT DOCD: CPT | Mod: HCNC,CPTII,S$GLB, | Performed by: INTERNAL MEDICINE

## 2022-01-27 PROCEDURE — 1126F PR PAIN SEVERITY QUANTIFIED, NO PAIN PRESENT: ICD-10-PCS | Mod: HCNC,CPTII,S$GLB, | Performed by: INTERNAL MEDICINE

## 2022-01-27 PROCEDURE — 1159F MED LIST DOCD IN RCRD: CPT | Mod: HCNC,CPTII,S$GLB, | Performed by: INTERNAL MEDICINE

## 2022-01-27 PROCEDURE — 99417 PROLNG OP E/M EACH 15 MIN: CPT | Mod: HCNC,S$GLB,, | Performed by: INTERNAL MEDICINE

## 2022-01-27 PROCEDURE — 1160F RVW MEDS BY RX/DR IN RCRD: CPT | Mod: HCNC,CPTII,S$GLB, | Performed by: INTERNAL MEDICINE

## 2022-01-27 PROCEDURE — 3008F PR BODY MASS INDEX (BMI) DOCUMENTED: ICD-10-PCS | Mod: HCNC,CPTII,S$GLB, | Performed by: INTERNAL MEDICINE

## 2022-01-27 PROCEDURE — 3074F PR MOST RECENT SYSTOLIC BLOOD PRESSURE < 130 MM HG: ICD-10-PCS | Mod: HCNC,CPTII,S$GLB, | Performed by: INTERNAL MEDICINE

## 2022-01-27 PROCEDURE — 1126F AMNT PAIN NOTED NONE PRSNT: CPT | Mod: HCNC,CPTII,S$GLB, | Performed by: INTERNAL MEDICINE

## 2022-01-27 RX ORDER — ATORVASTATIN CALCIUM 40 MG/1
40 TABLET, FILM COATED ORAL DAILY
Qty: 90 TABLET | Refills: 3 | Status: SHIPPED | OUTPATIENT
Start: 2022-01-27 | End: 2022-02-01 | Stop reason: SDUPTHER

## 2022-01-27 NOTE — PROGRESS NOTES
Subjective:       Patient ID: Zion Guillen is a 71 y.o. male who  has a past medical history of Arthritis, BPH (benign prostatic hyperplasia), Hyperlipidemia, and Hypertension.    Chief Complaint: Annual Exam, Back Pain, Knee Pain, and Wrist Pain     History was obtained from the patient and supplemented through chart review.  There were no ER or clinic visits since our last appointment.    He is from [facility].  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  His wife is Brenda Sotomayor (teaches at [employer]).    HPI    B/l knee OA:  Hurts to go up and down stairs.  X-ray with mild joint space narrowing.  H/o L meniscus repair.  Controlled with p.o. and topical Voltaren, glucosamine chondroitin. OSH Ortho recommended medical management rather than surgery.    Sx do not occur daily.  Had increased knee pain a few months ago.  Bought a sleeve. Has nearly resolved for the past month or so.  He currently feels well.    Chronic LBP:  Intermittent, but can be more significant at times. Sometimes has to lie down, take diclofenac and then it resolves. Doesn't impair his activities.  Swimming helps.     L wrist pain:  He is R handed.  Intermittent sharp pain at the anterior and posterior aspect of the medial L wrist.  No involvement of the palm/fingers.  Improves with wrist massage.  Used to type often, but now retired.  Holds his phone with that L hand.  Notices wrist pain afterwards. Gradual onset.  No weakness. No wrist cyst/mass.    HTN:    H/o dizziness with positional changes. Saw Neurology.  Not consistent with true vertigo/spinning.  MRI/MRA brain 1/2021 with chronic microvascular ischemic changes.    Pt's BP is controlled on Norvasc 10, losartan 25. Tolerating meds well.   Home BP: has an arm cuff. Checks in the afternoon after meditation.    ETOH: 1-2 drinks, 4/week  Exercise:  Walks daily x 3 miles. Swims 1 km 2-3/week.    Aortic atherosclerosis, HLD:    Is currently taking Lipitor 10. His dad passed away around his age d/t  "MI.  Diet: No meat, chicken. Eats fish. [place] 3/year.  Eats bread, cheese. Eats veggies. Fruit daily for breakfast.  Lab Results   Component Value Date    LDLCALC 97.6 01/24/2022     The 10-year ASCVD risk score (Fredrick DOYLE Jr., et al., 2013) is: 17.4%    Values used to calculate the score:      Age: 71 years      Sex: Male      Is Non- : No      Diabetic: No      Tobacco smoker: No      Systolic Blood Pressure: 110 mmHg      Is BP treated: Yes      HDL Cholesterol: 51 mg/dL      Total Cholesterol: 173 mg/dL    Vitamin D deficiency: Is taking supplements, 2000 units.  Lab Results   Component Value Date    IHTUJKDL89EO 49 01/24/2022    BZFNOBZL96TP 41 12/15/2020    GZQDRAIF94OL 40 09/11/2019     Bili, Na borderline high. Fasted for 10 hours before labs. Didn't drink much water.  CBC, CMP otherwise wnl.    Seborrheic keratosis  No change in size. No FHx skin cancer. No h/o being easily sunburned.            Not addressed today.  C scope in 5/15/2019 in [place] that was reportedly normal. Repeat in 10 years. Will no longer indicated by age.    BPH:  S/p TURP in 2018.    Review of Systems   Constitutional: Negative for activity change and appetite change.   Respiratory: Negative for wheezing.    Cardiovascular: Negative for leg swelling.   Musculoskeletal: Positive for arthralgias and back pain. Negative for gait problem.   Skin: Negative for rash and wound.   Hematological: Negative for adenopathy.   Psychiatric/Behavioral: Negative for confusion. The patient is not nervous/anxious.        I personally reviewed Past Medical History, Past Surgical History, Social History, and Family History.    Objective:      Vitals:    01/27/22 0831   BP: 110/76   BP Location: Right arm   Patient Position: Sitting   BP Method: Large (Manual)   Pulse: 68   SpO2: 98%   Weight: 74.1 kg (163 lb 5.8 oz)   Height: 5' 6" (1.676 m)      Physical Exam  Constitutional:       General: He is not in acute distress.     " Appearance: He is well-developed. He is not diaphoretic.   HENT:      Head: Normocephalic and atraumatic.      Nose: Nose normal.      Mouth/Throat:      Pharynx: No oropharyngeal exudate.   Eyes:      General: No scleral icterus.        Right eye: No discharge.         Left eye: No discharge.   Neck:      Thyroid: No thyromegaly.      Trachea: No tracheal deviation.   Cardiovascular:      Rate and Rhythm: Normal rate and regular rhythm.      Heart sounds: Normal heart sounds. No murmur heard.      Pulmonary:      Effort: Pulmonary effort is normal. No respiratory distress.      Breath sounds: Normal breath sounds. No wheezing.   Abdominal:      General: Bowel sounds are normal. There is no distension.      Palpations: Abdomen is soft.      Tenderness: There is no abdominal tenderness.   Musculoskeletal:         General: No deformity.      Right wrist: No swelling, tenderness or bony tenderness.      Left wrist: No swelling, tenderness or bony tenderness.      Cervical back: Neck supple.      Right lower leg: No edema.      Left lower leg: No edema.      Comments: No wrist cyst/mass. Neg phalen, Tinel sign.   Lymphadenopathy:      Cervical: No cervical adenopathy.   Skin:     General: Skin is warm and dry.      Findings: No erythema.      Comments: About 2 cm seborrheic keratosis of left mid back, R flank. No scaling.   Neurological:      Mental Status: He is alert.      Cranial Nerves: No cranial nerve deficit.      Gait: Gait normal.   Psychiatric:         Behavior: Behavior normal.           Lab Results   Component Value Date    WBC 5.15 01/24/2022    HGB 15.9 01/24/2022    HCT 47.0 01/24/2022     01/24/2022    CHOL 173 01/24/2022    TRIG 122 01/24/2022    HDL 51 01/24/2022    ALT 31 01/24/2022    AST 32 01/24/2022     (H) 01/24/2022    K 5.0 01/24/2022     01/24/2022    CREATININE 0.9 01/24/2022    BUN 10 01/24/2022    CO2 27 01/24/2022       The 10-year ASCVD risk score (Fredrick DOYLE Jr., et al.,  2013) is: 17.4%    Values used to calculate the score:      Age: 71 years      Sex: Male      Is Non- : No      Diabetic: No      Tobacco smoker: No      Systolic Blood Pressure: 110 mmHg      Is BP treated: Yes      HDL Cholesterol: 51 mg/dL      Total Cholesterol: 173 mg/dL    (Imaging have been independently reviewed)  MRI brain with chronic microvascular ischemic changes.  No acute abnormality.    Assessment:       1. Bilateral primary osteoarthritis of knee    2. Chronic low back pain, unspecified back pain laterality, unspecified whether sciatica present    3. Left wrist pain    4. Essential hypertension    5. Aortic atherosclerosis    6. Vitamin D deficiency    7. Hypernatremia    8. Seborrheic keratosis          Plan:       Zion was seen today for annual exam, back pain, knee pain and wrist pain.    Diagnoses and all orders for this visit:    Bilateral primary osteoarthritis of knee  Comments:  Intermittent. May take OTC analgesics PRN for flares. F/u with Ortho PRN; consider steroid injections if sx worsen.  Orders:  -     Ambulatory referral/consult to Orthopedics; Future    Chronic low back pain, unspecified back pain laterality, unspecified whether sciatica present  Comments:  Intermittent. May take OTC analgesics PRN for flares. Consider referral to back/spine clinic.    Left wrist pain  Comments:  Not c/w ganglioin cyst. Avoid triggers/phone use. May wear a wrist splint qHS for possible carpal tunnel syndrome.    Essential hypertension  Comments:  Controlled. Cont Norvasc 10, losartan 25. Monitor home BP. Caution d/t orthostasis. Doing well with diet, exercise.      Aortic atherosclerosis  Comments:  FLP controlled. ASCVD intermediate. Discussed R/B of moderate/high intensity statin. Increase Lipitor 10->40. Doing well with diet.  Monitor FLP annually.  Orders:  -     atorvastatin (LIPITOR) 40 MG tablet; Take 1 tablet (40 mg total) by mouth once daily.    Vitamin D  deficiency  Comments:  Vitamin-D level WNL.  Cont supplements.      Hypernatremia  Comments:  Slightly elevated. Likely hypovolemic hypernatremia d/t fasting for labs.     Seborrheic keratosis  Comments:  Discussed benign nature.  This may increase with age. Refer to Derm for TBSE.  Orders:  -     Ambulatory referral/consult to Dermatology; Future         Side effects of medication(s) were discussed in detail and patient voiced understanding.  Patient will call back for any issues or complications.     I have spent a total of 69 minutes with the patient as well as reviewing the chart/medical record and placing orders on the day of the visit. Discussed knee, back, wrist pain, HLD, SK.     RTC in 6 month(s) or sooner PRN for HTN

## 2022-01-27 NOTE — PATIENT INSTRUCTIONS
"Thank you for your message. We have been getting a lot of questions like yours.     [facility] is offering COVID-19 vaccinations in accordance to the recommendations of the [facility].  [facility] recommends scheduling your COVID Vaccine via [employer] by following the directions outlined below.      To Schedule your vaccine on the [employer] website:  Choose "Visits" then "Schedule an Appointment" and select the visit tile titled "COVID-19 Vaccine."    To Schedule your vaccine on the [employer] luana:  Choose "Appointments" then "Schedule an Appointment" then "Tell us why you're coming in" and select the visit tile titled "COVID Vaccine"      Patients may also call [phone] if they do not have a [facility] account.    More times and dates will become available as we receive more vaccine on a weekly basis. We encourage you to continue to check [facility] as more slots become available and appreciate your patience during this process.    Due to high activity, the [facility] website and COVID hotline may experience a slowdown or long wait times. We sincerely apologize for the inconvenience and appreciate your patience as you try and schedule your vaccination.    We are hopeful that the vaccine will be available to more members of the public soon. We encourage community members and patients to visit [email or web address] or ldh.[place].gov/[email or web address] or covidvaccine.[place].gov for the latest information and resources.     "

## 2022-02-01 DIAGNOSIS — I70.0 AORTIC ATHEROSCLEROSIS: ICD-10-CM

## 2022-02-01 NOTE — TELEPHONE ENCOUNTER
No new care gaps identified.  Powered by [employer]. Reference number: [id number].   2/01/2022 10:30:39 AM CST

## 2022-02-12 RX ORDER — ATORVASTATIN CALCIUM 40 MG/1
40 TABLET, FILM COATED ORAL DAILY
Qty: 90 TABLET | Refills: 3 | Status: SHIPPED | OUTPATIENT
Start: 2022-02-12 | End: 2022-07-18 | Stop reason: SDUPTHER

## 2022-02-12 NOTE — TELEPHONE ENCOUNTER
Refill Authorization Note   Zion Guillen  is requesting a refill authorization.  Brief Assessment and Rationale for Refill:  Approve     Medication Therapy Plan:       Medication Reconciliation Completed: No   Comments:   --->Care Gap information included below if applicable.   Orders Placed This Encounter    atorvastatin (LIPITOR) 40 MG tablet      Requested Prescriptions   Signed Prescriptions Disp Refills    atorvastatin (LIPITOR) 40 MG tablet 90 tablet 3     Sig: Take 1 tablet (40 mg total) by mouth once daily.       Cardiovascular:  Antilipid - Statins Passed - 2/1/2022 12:30 PM        Passed - Patient is at least 18 years old        Passed - Valid encounter within last 15 months     Recent Visits  Date Type Provider Dept   01/27/22 Office Visit Michelle Song MD [facility] Internal Medicine   07/27/21 Office Visit Michelle Song MD [facility] Internal Medicine   12/03/20 Office Visit Michelle Song MD [facility] Internal Medicine   06/11/20 Office Visit Daniel Batres MD [facility]/ Internal Med   03/11/20 Office Visit Daniel Batres MD [facility] Medicine/ Internal Med   Showing recent visits within past 720 days and meeting all other requirements  Future Appointments  No visits were found meeting these conditions.  Showing future appointments within next 150 days and meeting all other requirements      Future Appointments              In 4 months Michelle Song MD [facility] - Internal Medicine, [facility]                Passed - ALT is 131 or below and within 360 days     ALT   Date Value Ref Range Status   01/24/2022 31 10 - 44 U/L Final   12/15/2020 27 10 - 44 U/L Final   03/12/2020 21 10 - 44 U/L Final              Passed - AST is 119 or below and within 360 days     AST   Date Value Ref Range Status   01/24/2022 32 10 - 40 U/L Final   12/15/2020 29 10 - 40 U/L Final   03/12/2020 25 10 - 40 U/L Final              Passed - Total Cholesterol within 360 days     Lab Results   Component Value Date     CHOL 173 01/24/2022    CHOL 155 12/15/2020    CHOL 161 03/12/2020              Passed - LDL within 360 days     LDL Cholesterol   Date Value Ref Range Status   01/24/2022 97.6 63.0 - 159.0 mg/dL Final     Comment:     The National Cholesterol Education Program (NCEP) has set the  following guidelines (reference values) for LDL Cholesterol:  Optimal.......................<130 mg/dL  Borderline High...............130-159 mg/dL  High..........................160-189 mg/dL  Very High.....................>190 mg/dL              Passed - HDL within 360 days     HDL   Date Value Ref Range Status   01/24/2022 51 40 - 75 mg/dL Final     Comment:     The National Cholesterol Education Program (NCEP) has set the  following guidelines (reference values) for HDL Cholesterol:  Low...............<40 mg/dL  Optimal...........>60 mg/dL              Passed - Triglycerides within 360 days     Lab Results   Component Value Date    TRIG 122 01/24/2022    TRIG 103 12/15/2020    TRIG 94 03/12/2020                  Appointments  past 12m or future 3m with PCP    Date Provider   Last Visit   1/27/2022 Michelle Song MD   Next Visit   7/1/2022 Michelle Song MD   ED visits in past 90 days: 0     Note composed:2:20 PM 02/12/2022

## 2022-05-04 DIAGNOSIS — I10 ESSENTIAL HYPERTENSION: ICD-10-CM

## 2022-05-04 NOTE — TELEPHONE ENCOUNTER
No new care gaps identified.  [facility] Embedded Care Gaps. Reference number: [id number]. 5/04/2022   3:49:02 PM CDT

## 2022-05-05 RX ORDER — LOSARTAN POTASSIUM 25 MG/1
25 TABLET ORAL DAILY
Qty: 90 TABLET | Refills: 3 | OUTPATIENT
Start: 2022-05-05

## 2022-05-05 NOTE — TELEPHONE ENCOUNTER
Quick DC. Request already responded to by other means (e.g. phone or fax)   Refill Authorization Note   Zion Guillen  is requesting a refill authorization.  Brief Assessment and Rationale for Refill:  Quick Discontinue  Medication Therapy Plan:       Medication Reconciliation Completed:  No      Comments:   Pended Medication(s)       Requested Prescriptions     Refused Prescriptions Disp Refills    losartan (COZAAR) 25 MG tablet 90 tablet 3     Sig: Take 1 tablet (25 mg total) by mouth once daily.     Refused By: MATHEUS CORONADO     Reason for Refusal: Request already responded to by other means (e.g. phone or fax)        Duplicate Pended Encounter(s)/ Last Prescribed Details: (includes pharmacy & prescriber details)              Note composed:3:31 PM 05/05/2022

## 2022-06-06 DIAGNOSIS — I10 ESSENTIAL HYPERTENSION: ICD-10-CM

## 2022-06-06 RX ORDER — LOSARTAN POTASSIUM 25 MG/1
25 TABLET ORAL DAILY
Qty: 90 TABLET | Refills: 0 | Status: SHIPPED | OUTPATIENT
Start: 2022-06-06 | End: 2022-07-01 | Stop reason: SDUPTHER

## 2022-06-06 NOTE — TELEPHONE ENCOUNTER
No new care gaps identified.  [facility] Embedded Care Gaps. Reference number: [id number]. 6/06/2022   9:07:56 AM MERRYT

## 2022-07-01 ENCOUNTER — OFFICE VISIT (OUTPATIENT)
Dept: INTERNAL MEDICINE | Facility: CLINIC | Age: 72
End: 2022-07-01
Payer: MEDICARE

## 2022-07-01 VITALS
DIASTOLIC BLOOD PRESSURE: 80 MMHG | HEART RATE: 60 BPM | WEIGHT: 160.94 LBS | OXYGEN SATURATION: 95 % | HEIGHT: 66 IN | SYSTOLIC BLOOD PRESSURE: 134 MMHG | BODY MASS INDEX: 25.86 KG/M2

## 2022-07-01 DIAGNOSIS — E87.0 HYPERNATREMIA: ICD-10-CM

## 2022-07-01 DIAGNOSIS — I70.0 AORTIC ATHEROSCLEROSIS: ICD-10-CM

## 2022-07-01 DIAGNOSIS — C44.91 BASAL CELL CARCINOMA (BCC), UNSPECIFIED SITE: ICD-10-CM

## 2022-07-01 DIAGNOSIS — M17.0 BILATERAL PRIMARY OSTEOARTHRITIS OF KNEE: Primary | ICD-10-CM

## 2022-07-01 DIAGNOSIS — Z00.00 ANNUAL PHYSICAL EXAM: ICD-10-CM

## 2022-07-01 DIAGNOSIS — M54.50 CHRONIC LOW BACK PAIN, UNSPECIFIED BACK PAIN LATERALITY, UNSPECIFIED WHETHER SCIATICA PRESENT: ICD-10-CM

## 2022-07-01 DIAGNOSIS — G89.29 CHRONIC LOW BACK PAIN, UNSPECIFIED BACK PAIN LATERALITY, UNSPECIFIED WHETHER SCIATICA PRESENT: ICD-10-CM

## 2022-07-01 DIAGNOSIS — I10 ESSENTIAL HYPERTENSION: ICD-10-CM

## 2022-07-01 DIAGNOSIS — E66.3 OVERWEIGHT (BMI 25.0-29.9): ICD-10-CM

## 2022-07-01 PROCEDURE — 99999 PR PBB SHADOW E&M-EST. PATIENT-LVL III: CPT | Mod: PBBFAC,,, | Performed by: INTERNAL MEDICINE

## 2022-07-01 PROCEDURE — 99999 PR PBB SHADOW E&M-EST. PATIENT-LVL III: ICD-10-PCS | Mod: PBBFAC,,, | Performed by: INTERNAL MEDICINE

## 2022-07-01 PROCEDURE — 3008F BODY MASS INDEX DOCD: CPT | Mod: CPTII,S$GLB,, | Performed by: INTERNAL MEDICINE

## 2022-07-01 PROCEDURE — 3008F PR BODY MASS INDEX (BMI) DOCUMENTED: ICD-10-PCS | Mod: CPTII,S$GLB,, | Performed by: INTERNAL MEDICINE

## 2022-07-01 PROCEDURE — 3075F PR MOST RECENT SYSTOLIC BLOOD PRESS GE 130-139MM HG: ICD-10-PCS | Mod: CPTII,S$GLB,, | Performed by: INTERNAL MEDICINE

## 2022-07-01 PROCEDURE — 4010F PR ACE/ARB THEARPY RXD/TAKEN: ICD-10-PCS | Mod: CPTII,S$GLB,, | Performed by: INTERNAL MEDICINE

## 2022-07-01 PROCEDURE — 1126F AMNT PAIN NOTED NONE PRSNT: CPT | Mod: CPTII,S$GLB,, | Performed by: INTERNAL MEDICINE

## 2022-07-01 PROCEDURE — 3288F FALL RISK ASSESSMENT DOCD: CPT | Mod: CPTII,S$GLB,, | Performed by: INTERNAL MEDICINE

## 2022-07-01 PROCEDURE — 3075F SYST BP GE 130 - 139MM HG: CPT | Mod: CPTII,S$GLB,, | Performed by: INTERNAL MEDICINE

## 2022-07-01 PROCEDURE — 1101F PT FALLS ASSESS-DOCD LE1/YR: CPT | Mod: CPTII,S$GLB,, | Performed by: INTERNAL MEDICINE

## 2022-07-01 PROCEDURE — 3288F PR FALLS RISK ASSESSMENT DOCUMENTED: ICD-10-PCS | Mod: CPTII,S$GLB,, | Performed by: INTERNAL MEDICINE

## 2022-07-01 PROCEDURE — 1160F PR REVIEW ALL MEDS BY PRESCRIBER/CLIN PHARMACIST DOCUMENTED: ICD-10-PCS | Mod: CPTII,S$GLB,, | Performed by: INTERNAL MEDICINE

## 2022-07-01 PROCEDURE — 1160F RVW MEDS BY RX/DR IN RCRD: CPT | Mod: CPTII,S$GLB,, | Performed by: INTERNAL MEDICINE

## 2022-07-01 PROCEDURE — 1159F MED LIST DOCD IN RCRD: CPT | Mod: CPTII,S$GLB,, | Performed by: INTERNAL MEDICINE

## 2022-07-01 PROCEDURE — 3079F DIAST BP 80-89 MM HG: CPT | Mod: CPTII,S$GLB,, | Performed by: INTERNAL MEDICINE

## 2022-07-01 PROCEDURE — 1101F PR PT FALLS ASSESS DOC 0-1 FALLS W/OUT INJ PAST YR: ICD-10-PCS | Mod: CPTII,S$GLB,, | Performed by: INTERNAL MEDICINE

## 2022-07-01 PROCEDURE — 99215 PR OFFICE/OUTPT VISIT, EST, LEVL V, 40-54 MIN: ICD-10-PCS | Mod: S$GLB,,, | Performed by: INTERNAL MEDICINE

## 2022-07-01 PROCEDURE — 1126F PR PAIN SEVERITY QUANTIFIED, NO PAIN PRESENT: ICD-10-PCS | Mod: CPTII,S$GLB,, | Performed by: INTERNAL MEDICINE

## 2022-07-01 PROCEDURE — 1159F PR MEDICATION LIST DOCUMENTED IN MEDICAL RECORD: ICD-10-PCS | Mod: CPTII,S$GLB,, | Performed by: INTERNAL MEDICINE

## 2022-07-01 PROCEDURE — 4010F ACE/ARB THERAPY RXD/TAKEN: CPT | Mod: CPTII,S$GLB,, | Performed by: INTERNAL MEDICINE

## 2022-07-01 PROCEDURE — 99215 OFFICE O/P EST HI 40 MIN: CPT | Mod: S$GLB,,, | Performed by: INTERNAL MEDICINE

## 2022-07-01 PROCEDURE — 3079F PR MOST RECENT DIASTOLIC BLOOD PRESSURE 80-89 MM HG: ICD-10-PCS | Mod: CPTII,S$GLB,, | Performed by: INTERNAL MEDICINE

## 2022-07-01 RX ORDER — DICLOFENAC SODIUM 10 MG/G
2 GEL TOPICAL 4 TIMES DAILY PRN
Qty: 100 G | Refills: 11 | Status: SHIPPED | OUTPATIENT
Start: 2022-07-01 | End: 2022-10-17 | Stop reason: SDUPTHER

## 2022-07-01 RX ORDER — LOSARTAN POTASSIUM 25 MG/1
25 TABLET ORAL DAILY
Qty: 90 TABLET | Refills: 3 | Status: SHIPPED | OUTPATIENT
Start: 2022-07-01 | End: 2022-09-04 | Stop reason: SDUPTHER

## 2022-07-01 NOTE — PROGRESS NOTES
Subjective:       Patient ID: Zion Guillen is a 72 y.o. male who  has a past medical history of Arthritis, BCC (basal cell carcinoma of skin) (7/1/2022), BPH (benign prostatic hyperplasia), Hyperlipidemia, and Hypertension.    Chief Complaint: Hypertension, Back Pain, and Knee Pain     History was obtained from the patient and supplemented through chart review.  There were no ER or clinic visits since our last appointment.  -Reviewed outside records from [facility] for BCC.    He is from [facility].  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  His wife is Brenda Sotomayor (teaches at [employer]).    HPI    He feels well other than minor arthralgias.    B/l knee OA:  Hurts to go up and down stairs. Sx do not occur daily.  Not persistent. Sx resolved even with walking. X-ray with mild joint space narrowing.  H/o L meniscus repair.  Controlled with PO Voltaren. OSH Ortho recommended medical management rather than surgery.    Chronic LBP:  Intermittent, but can be more significant at times. Can occur with position change. Sometimes has to lie down, take diclofenac and then it resolves. Takes it on days with increased activity.  Takes it 2 day/week. Doesn't impair his activities.  Swimming helps.     HTN:    H/o dizziness with positional changes. Saw Neurology.  Not consistent with true vertigo/spinning.  MRI/MRA brain 1/2021 with chronic microvascular ischemic changes.    Pt's BP is controlled on Norvasc 10, losartan 25. Tolerating meds well. Taking meds consistently qAM.  Home BP: has an arm cuff. Hasn't checked regularly lately.    ETOH: 1-2 drinks, 4/week  Exercise:  Walks daily x 3 miles. Swims 1 km 2-3/week.     Aortic atherosclerosis, HLD:    Is currently taking Lipitor increased to 40 d/t ASCVD. His dad passed away around his age d/t MI.  Diet: No meat, chicken. Eats fish. Lynch 3/year.  Eats bread, cheese. Eats veggies. Fruit daily for breakfast.  Lab Results   Component Value Date    LDLCALC 97.6 01/24/2022  "    The 10-year ASCVD risk score (Fredrickchani DOYLE Jr., et al., 2013) is: 25.5%    Values used to calculate the score:      Age: 72 years      Sex: Male      Is Non- : No      Diabetic: No      Tobacco smoker: No      Systolic Blood Pressure: 134 mmHg      Is BP treated: Yes      HDL Cholesterol: 51 mg/dL      Total Cholesterol: 173 mg/dL    Bili, Na borderline high. Fasted for 10 hours before labs. Didn't drink much water.  CBC, CMP otherwise wnl.    BCC:  Following c [facility] dermatology for BCC of the back. S/p excision. Following annually for TBSE              Not addressed today.  Vitamin D deficiency: Is taking supplements, 2000 units.  Vitamin-D level WNL.  Cont supplements.    Lab Results   Component Value Date    [id number] 49 01/24/2022    [id number] 41 12/15/2020    [id number] 40 09/11/2019     C scope in 5/15/2019 in [place] that was reportedly normal. Repeat in 10 years. Will no longer indicated by age.    BPH:  S/p TURP in 2018.    Review of Systems   Constitutional: Negative for activity change and appetite change.   Respiratory: Negative for wheezing.    Musculoskeletal: Positive for arthralgias and back pain. Negative for gait problem.   Skin: Negative for rash and wound.   Hematological: Negative for adenopathy.   Psychiatric/Behavioral: Negative for confusion. The patient is not nervous/anxious.        I personally reviewed Past Medical History, Past Surgical History, Social History, and Family History.    Objective:      Vitals:    07/01/22 1052 07/01/22 1109   BP: (!) 142/82 134/80   Pulse: 60    SpO2: 95%    Weight: 73 kg (160 lb 15 oz)    Height: 5' 6" (1.676 m)       Physical Exam  Constitutional:       General: He is not in acute distress.     Appearance: He is well-developed. He is not diaphoretic.   HENT:      Head: Normocephalic and atraumatic.      Nose: Nose normal.      Mouth/Throat:      Pharynx: No oropharyngeal exudate.   Eyes:      General: No scleral icterus. "        Right eye: No discharge.         Left eye: No discharge.   Neck:      Thyroid: No thyromegaly.      Trachea: No tracheal deviation.   Cardiovascular:      Rate and Rhythm: Normal rate and regular rhythm.      Heart sounds: Normal heart sounds. No murmur heard.  Pulmonary:      Effort: Pulmonary effort is normal. No respiratory distress.      Breath sounds: Normal breath sounds. No wheezing.   Abdominal:      General: Bowel sounds are normal. There is no distension.      Palpations: Abdomen is soft.      Tenderness: There is no abdominal tenderness.   Musculoskeletal:         General: No deformity.      Cervical back: Neck supple.      Right lower leg: No edema.      Left lower leg: No edema.   Lymphadenopathy:      Cervical: No cervical adenopathy.   Skin:     General: Skin is warm and dry.      Findings: No erythema.   Neurological:      Mental Status: He is alert.      Gait: Gait normal.   Psychiatric:         Behavior: Behavior normal.           Lab Results   Component Value Date    WBC 5.15 01/24/2022    HGB 15.9 01/24/2022    HCT 47.0 01/24/2022     01/24/2022    CHOL 173 01/24/2022    TRIG 122 01/24/2022    HDL 51 01/24/2022    ALT 31 01/24/2022    AST 32 01/24/2022     (H) 01/24/2022    K 5.0 01/24/2022     01/24/2022    CREATININE 0.9 01/24/2022    BUN 10 01/24/2022    CO2 27 01/24/2022       The 10-year ASCVD risk score (Friona VIVEK Breaux., et al., 2013) is: 25.5%    Values used to calculate the score:      Age: 72 years      Sex: Male      Is Non- : No      Diabetic: No      Tobacco smoker: No      Systolic Blood Pressure: 134 mmHg      Is BP treated: Yes      HDL Cholesterol: 51 mg/dL      Total Cholesterol: 173 mg/dL    (Imaging have been independently reviewed)  MRI brain with chronic microvascular ischemic changes.  No acute abnormality.    Assessment:       1. Bilateral primary osteoarthritis of knee    2. Chronic low back pain, unspecified back pain  laterality, unspecified whether sciatica present    3. Essential hypertension    4. Aortic atherosclerosis    5. Hypernatremia    6. Overweight (BMI 25.0-29.9)    7. Basal cell carcinoma (BCC), unspecified site    8. Annual physical exam          Plan:       Zion was seen today for hypertension, back pain and knee pain.    Diagnoses and all orders for this visit:    Bilateral primary osteoarthritis of knee  Comments:  Intermittent. Try topical Voltaren first, PO voltaren 2nd line. F/u with Ortho PRN; consider steroid injections if sx worsen.  Orders:  -     diclofenac sodium (VOLTAREN) 1 % Gel; Apply 2 g topically 4 (four) times daily as needed (pain).    Chronic low back pain, unspecified back pain laterality, unspecified whether sciatica present  Comments:  Intermittent. May take OTC analgesics PRN; is not taking excessively. Consider referral to back/spine clinic.    Essential hypertension  Comments:  Controlled. Cont Norvasc 10, losartan 25. Monitor home BP. Provided BP handout. Caution d/t orthostasis. Doing well with diet, exercise.    Orders:  -     CBC Auto Differential; Future  -     Comprehensive Metabolic Panel; Future  -     losartan (COZAAR) 25 MG tablet; Take 1 tablet (25 mg total) by mouth once daily.    Aortic atherosclerosis  Comments:  FLP controlled. Increased Lipitor 40 d/t ASCVD. Doing well with diet.  Monitor FLP annually.  Orders:  -     CBC Auto Differential; Future  -     Lipid Panel; Future  -     Hemoglobin A1C; Future    Hypernatremia  Comments:  Slightly elevated. Likely hypovolemic hypernatremia d/t fasting for labs.  Monitor.    Overweight (BMI 25.0-29.9)  Comments:  Doing well with diet, exercise. Check A1C.  Orders:  -     Hemoglobin A1C; Future    Basal cell carcinoma (BCC), unspecified site  Comments:  S/p excision. F/u c OSH Dermatology.    Annual physical exam  -     CBC Auto Differential; Future  -     Comprehensive Metabolic Panel; Future  -     Lipid Panel; Future  -      Hemoglobin A1C; Future         Side effects of medication(s) were discussed in detail and patient voiced understanding.  Patient will call back for any issues or complications.     I have spent a total of 40 minutes with the patient as well as reviewing the chart/medical record and placing orders on the day of the visit. Discussed arthralgias, HTN, BCC.     RTC in 6 month(s) or sooner PRN for HTN with labs prior.

## 2022-07-26 ENCOUNTER — PES CALL (OUTPATIENT)
Dept: ADMINISTRATIVE | Facility: CLINIC | Age: 72
End: 2022-07-26
Payer: MEDICARE

## 2022-08-16 LAB — HEMOCCULT STL QL IA: NEGATIVE

## 2022-08-31 ENCOUNTER — PATIENT MESSAGE (OUTPATIENT)
Dept: INTERNAL MEDICINE | Facility: CLINIC | Age: 72
End: 2022-08-31
Payer: MEDICARE

## 2022-09-19 ENCOUNTER — PATIENT OUTREACH (OUTPATIENT)
Dept: ADMINISTRATIVE | Facility: HOSPITAL | Age: 72
End: 2022-09-19
Payer: MEDICARE

## 2022-09-27 ENCOUNTER — OFFICE VISIT (OUTPATIENT)
Dept: INTERNAL MEDICINE | Facility: CLINIC | Age: 72
End: 2022-09-27
Payer: MEDICARE

## 2022-09-27 DIAGNOSIS — I10 ESSENTIAL HYPERTENSION: ICD-10-CM

## 2022-09-27 DIAGNOSIS — J00 ACUTE NASOPHARYNGITIS: Primary | ICD-10-CM

## 2022-09-27 DIAGNOSIS — Z23 INFLUENZA VACCINE NEEDED: ICD-10-CM

## 2022-09-27 DIAGNOSIS — J30.9 CHRONIC ALLERGIC RHINITIS: ICD-10-CM

## 2022-09-27 PROCEDURE — 99999 PR PBB SHADOW E&M-EST. PATIENT-LVL IV: CPT | Mod: PBBFAC,,, | Performed by: INTERNAL MEDICINE

## 2022-09-27 PROCEDURE — 4010F ACE/ARB THERAPY RXD/TAKEN: CPT | Mod: CPTII,S$GLB,, | Performed by: INTERNAL MEDICINE

## 2022-09-27 PROCEDURE — 3288F PR FALLS RISK ASSESSMENT DOCUMENTED: ICD-10-PCS | Mod: CPTII,S$GLB,, | Performed by: INTERNAL MEDICINE

## 2022-09-27 PROCEDURE — 4010F PR ACE/ARB THEARPY RXD/TAKEN: ICD-10-PCS | Mod: CPTII,S$GLB,, | Performed by: INTERNAL MEDICINE

## 2022-09-27 PROCEDURE — 1160F RVW MEDS BY RX/DR IN RCRD: CPT | Mod: CPTII,S$GLB,, | Performed by: INTERNAL MEDICINE

## 2022-09-27 PROCEDURE — 99215 PR OFFICE/OUTPT VISIT, EST, LEVL V, 40-54 MIN: ICD-10-PCS | Mod: S$GLB,,, | Performed by: INTERNAL MEDICINE

## 2022-09-27 PROCEDURE — 1160F PR REVIEW ALL MEDS BY PRESCRIBER/CLIN PHARMACIST DOCUMENTED: ICD-10-PCS | Mod: CPTII,S$GLB,, | Performed by: INTERNAL MEDICINE

## 2022-09-27 PROCEDURE — 99215 OFFICE O/P EST HI 40 MIN: CPT | Mod: S$GLB,,, | Performed by: INTERNAL MEDICINE

## 2022-09-27 PROCEDURE — 1101F PR PT FALLS ASSESS DOC 0-1 FALLS W/OUT INJ PAST YR: ICD-10-PCS | Mod: CPTII,S$GLB,, | Performed by: INTERNAL MEDICINE

## 2022-09-27 PROCEDURE — 1101F PT FALLS ASSESS-DOCD LE1/YR: CPT | Mod: CPTII,S$GLB,, | Performed by: INTERNAL MEDICINE

## 2022-09-27 PROCEDURE — 99999 PR PBB SHADOW E&M-EST. PATIENT-LVL IV: ICD-10-PCS | Mod: PBBFAC,,, | Performed by: INTERNAL MEDICINE

## 2022-09-27 PROCEDURE — 1159F PR MEDICATION LIST DOCUMENTED IN MEDICAL RECORD: ICD-10-PCS | Mod: CPTII,S$GLB,, | Performed by: INTERNAL MEDICINE

## 2022-09-27 PROCEDURE — 3288F FALL RISK ASSESSMENT DOCD: CPT | Mod: CPTII,S$GLB,, | Performed by: INTERNAL MEDICINE

## 2022-09-27 PROCEDURE — 1159F MED LIST DOCD IN RCRD: CPT | Mod: CPTII,S$GLB,, | Performed by: INTERNAL MEDICINE

## 2022-09-27 RX ORDER — AZELASTINE 1 MG/ML
1 SPRAY, METERED NASAL 2 TIMES DAILY
Qty: 30 ML | Refills: 11 | Status: SHIPPED | OUTPATIENT
Start: 2022-09-27 | End: 2022-10-21 | Stop reason: SDUPTHER

## 2022-09-27 RX ORDER — GUAIFENESIN 600 MG/1
600 TABLET, EXTENDED RELEASE ORAL 2 TIMES DAILY
Qty: 30 TABLET | Refills: 1 | Status: SHIPPED | OUTPATIENT
Start: 2022-09-27 | End: 2022-12-16 | Stop reason: SDUPTHER

## 2022-09-27 NOTE — PROGRESS NOTES
Subjective:       Patient ID: Zion Guillen is a 72 y.o. male who  has a past medical history of Arthritis, BCC (basal cell carcinoma of skin) (7/1/2022), BPH (benign prostatic hyperplasia), Hyperlipidemia, and Hypertension.    Chief Complaint: Sore Throat     History was obtained from the patient and supplemented through chart review.  There were no ER or clinic visits since our last appointment.    He is from [facility].  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  His wife is Brenda Sotomayor (teaches at [employer]).    Sore Throat   This is a recurrent problem. The current episode started in the past 7 days. The pain is worse on the left side. There has been no fever. The pain is at a severity of 3/10. The pain is mild. Associated symptoms include coughing, a hoarse voice, a plugged ear sensation and trouble swallowing. Pertinent negatives include no abdominal pain, diarrhea, drooling, ear discharge, ear pain, headaches, neck pain, shortness of breath, stridor, swollen glands or vomiting. He has had no exposure to strep or mono. He has tried NSAIDs and gargles for the symptoms. The treatment provided mild relief.     He is concerned for recurrent sinus infections. Has had recurrent symptoms for about a month now.  Intermittent sore throat, odynophagia.    + rhinorrhea, postnasal drip.  + cough, sometimes with difficulty expectorating sputum. No coughing spells, difficulty sleeping at night.  No fever, myalgias.  He feels that his L ear is slightly muffled.  No ear discharge, otalgia, fever.  No malaise. Still able to exercise, no physical restrictions. Is still swimming.  No CP, SOB, wheezing, chest tightness.  No sick contacts.  Temporary relief with OTC for cold/fever/sore throat, gargles.    AR:   To pollen, dust.  Takes Zyrtec.   Usually occurs 1/year. Not associated with time of year.    HTN:    H/o dizziness with positional changes. Saw Neurology.  Not consistent with true vertigo/spinning.  MRI/MRA brain 1/2021 with  chronic microvascular ischemic changes.    Pt's BP is controlled on Norvasc 10, losartan 25. Tolerating meds well. Taking meds consistently qAM.  Home BP: has an arm cuff.     ETOH: 1-2 drinks, 4/week  Exercise:  Walks daily x 3 miles. Swims 1 km 2-3/week.               Not addressed today.  Aortic atherosclerosis, HLD:    Is currently taking Lipitor increased to 40 d/t ASCVD. His dad passed away around his age d/t MI.  Diet: No meat, chicken. Eats fish. Lynch 3/year.  Eats bread, cheese. Eats veggies. Fruit daily for breakfast.  FLP controlled. Increased Lipitor 40 d/t ASCVD. Doing well with diet.  Monitor FLP annually.  Lab Results   Component Value Date    LDLCALC 97.6 01/24/2022     The 10-year ASCVD risk score (Katy WILLIAMSON, et al., 2019) is: 22.9%    Values used to calculate the score:      Age: 72 years      Sex: Male      Is Non- : No      Diabetic: No      Tobacco smoker: No      Systolic Blood Pressure: 125 mmHg      Is BP treated: Yes      HDL Cholesterol: 51 mg/dL      Total Cholesterol: 173 mg/dL    Overweight:  Diet, exercise as above.  Stable. Doing well with diet, exercise. Check A1C with next labs.  BMI Readings from Last 3 Encounters:   09/27/22 (P) 25.37 kg/m²   07/01/22 25.98 kg/m²   01/27/22 26.37 kg/m²     No results found for: HGBA1C    Bili, Na borderline high. Fasted for 10 hours before labs. Didn't drink much water.  CBC, CMP otherwise wnl.  Slightly elevated. Likely hypovolemic hypernatremia d/t fasting for labs.  Monitor.    Vitamin D deficiency: Is taking supplements, 2000 units.  Vitamin-D level WNL.  Cont supplements.    Lab Results   Component Value Date    [id number] 49 01/24/2022    [id number] 41 12/15/2020    [id number] 40 09/11/2019     C scope in 5/15/2019 in [place] that was reportedly normal. Repeat in 10 years. Will no longer indicated by age.    BPH:  S/p TURP in 2018.    BCC:  Following c [facility] dermatology for BCC of the back. S/p excision.  "Following annually for TBSE  S/p excision. F/u c OSH Dermatology.    B/l knee OA:  Hurts to go up and down stairs. Sx do not occur daily.  Not persistent. Sx resolved even with walking. X-ray with mild joint space narrowing.  H/o L meniscus repair.  Controlled with PO Voltaren. OSH Ortho recommended medical management rather than surgery.  Intermittent. Try topical Voltaren first, PO voltaren 2nd line. F/u with Ortho PRN; consider steroid injections if sx worsen.    Chronic LBP:  Intermittent, but can be more significant at times. Can occur with position change. Sometimes has to lie down, take diclofenac and then it resolves. Takes it on days with increased activity.  Takes it 2 day/week. Doesn't impair his activities.  Swimming helps.   Intermittent. May take OTC analgesics PRN; is not taking excessively. Consider referral to back/spine clinic.    Review of Systems   Constitutional:  Negative for activity change and fever.   HENT:  Positive for hoarse voice, postnasal drip, rhinorrhea, sore throat and trouble swallowing. Negative for drooling, ear discharge and ear pain.    Respiratory:  Positive for cough. Negative for shortness of breath, wheezing and stridor.    Cardiovascular:  Negative for leg swelling.   Gastrointestinal:  Negative for abdominal pain, diarrhea and vomiting.   Musculoskeletal:  Negative for gait problem, myalgias and neck pain.   Skin:  Negative for rash and wound.   Neurological:  Negative for headaches.   Hematological:  Negative for adenopathy.   Psychiatric/Behavioral:  Negative for confusion and sleep disturbance.        I personally reviewed Past Medical History, Past Surgical History, Social History, and Family History.    Objective:      Vitals:    09/27/22 1204   BP: (P) 125/60   Pulse: (P) 66   SpO2: (P) 98%   Weight: (P) 71.3 kg (157 lb 3 oz)   Height: (P) 5' 6" (1.676 m)        Physical Exam  Constitutional:       General: He is not in acute distress.     Appearance: He is " well-developed. He is not diaphoretic.   HENT:      Head: Normocephalic and atraumatic.      Right Ear: Tympanic membrane, ear canal and external ear normal.      Left Ear: Tympanic membrane, ear canal and external ear normal.      Nose: Mucosal edema present. No rhinorrhea.      Mouth/Throat:      Pharynx: Uvula midline. Posterior oropharyngeal erythema present. No oropharyngeal exudate.   Eyes:      General: No scleral icterus.        Right eye: No discharge.         Left eye: No discharge.      Conjunctiva/sclera:      Right eye: Right conjunctiva is not injected.      Left eye: Left conjunctiva is not injected.   Neck:      Thyroid: No thyromegaly.      Trachea: No tracheal deviation.   Cardiovascular:      Rate and Rhythm: Normal rate and regular rhythm.      Heart sounds: Normal heart sounds. No murmur heard.  Pulmonary:      Effort: Pulmonary effort is normal. No respiratory distress.      Breath sounds: Normal breath sounds. No stridor. No wheezing.   Abdominal:      General: Bowel sounds are normal. There is no distension.      Palpations: Abdomen is soft.      Tenderness: There is no abdominal tenderness.   Musculoskeletal:         General: No deformity.      Cervical back: Neck supple.      Right lower leg: No edema.      Left lower leg: No edema.   Lymphadenopathy:      Cervical: No cervical adenopathy.   Skin:     General: Skin is warm and dry.      Findings: No erythema.   Neurological:      Mental Status: He is alert.      Gait: Gait normal.   Psychiatric:         Behavior: Behavior normal.         Lab Results   Component Value Date    WBC 5.15 01/24/2022    HGB 15.9 01/24/2022    HCT 47.0 01/24/2022     01/24/2022    CHOL 173 01/24/2022    TRIG 122 01/24/2022    HDL 51 01/24/2022    ALT 31 01/24/2022    AST 32 01/24/2022     (H) 01/24/2022    K 5.0 01/24/2022     01/24/2022    CREATININE 0.9 01/24/2022    BUN 10 01/24/2022    CO2 27 01/24/2022       The 10-year ASCVD risk score  (Katy WILLIAMSON, et al., 2019) is: 22.9%    Values used to calculate the score:      Age: 72 years      Sex: Male      Is Non- : No      Diabetic: No      Tobacco smoker: No      Systolic Blood Pressure: 125 mmHg      Is BP treated: Yes      HDL Cholesterol: 51 mg/dL      Total Cholesterol: 173 mg/dL    (Imaging have been independently reviewed)  MRI brain with chronic microvascular ischemic changes.  No acute abnormality.    Assessment:       1. Acute nasopharyngitis    2. Chronic allergic rhinitis    3. Influenza vaccine needed    4. Essential hypertension            Plan:       Zion was seen today for sore throat.    Diagnoses and all orders for this visit:    Acute nasopharyngitis  Comments:  Likely viral. Rec supportive tx: hydration, antihistamines, Astelin, Guaifenesin. NSAIDs PRN for sore throat.  Orders:  -     guaiFENesin (MUCINEX) 600 mg 12 hr tablet; Take 1 tablet (600 mg total) by mouth 2 (two) times daily.  -     azelastine (ASTELIN) 137 mcg (0.1 %) nasal spray; 1 spray (137 mcg total) by Nasal route 2 (two) times daily.    Chronic allergic rhinitis  Comments:  Cont Antihistamine daily PRN.     Influenza vaccine needed  Comments:  Advised to obtain vaccine at Pharmacy when he feels better.    Essential hypertension  Comments:  Controlled. Cont Norvasc 10, losartan 25. Avoid DM. Monitor home BP. Doing well with diet, exercise. Monitor CMP.          Side effects of medication(s) were discussed in detail and patient voiced understanding.  Patient will call back for any issues or complications.     I have spent a total of 40 minutes with the patient as well as reviewing the chart/medical record and placing orders on the day of the visit. Discussed URI, HTN, vaccines.     RTC in 3 month(s) or sooner PRN for HTN with labs prior.

## 2022-09-27 NOTE — PATIENT INSTRUCTIONS
All of your core healthy metrics are met.      I recommend plenty of rest and drinking plenty of fluids.  Tylenol and NSAIDs, such as ibuprofen, Motrin, naproxen can be helpful for sore throat, headache, ear pain, muscle and joint pain, sneezing, fatigue.  Chloroseptic spray, lozenges can also soothe a sore throat.  Over-the-counter antihistamines (Allegra, Claritin, Zyrtec) for runny nose.  Nasal saline once to twice a day.  Hand washing can help prevent the spread of respiratory illnesses, especially from younger children.    If you have high blood pressure you can not take Sudafed or any medications with DM (dextromethorphan) because it will elevate your blood pressure. You may take Coricidin HBP over the counter.

## 2022-10-05 ENCOUNTER — TELEPHONE (OUTPATIENT)
Dept: AUDIOLOGY | Facility: CLINIC | Age: 72
End: 2022-10-05
Payer: MEDICARE

## 2022-10-05 DIAGNOSIS — H90.3 SENSORINEURAL HEARING LOSS, BILATERAL: Primary | ICD-10-CM

## 2022-10-06 ENCOUNTER — CLINICAL SUPPORT (OUTPATIENT)
Dept: AUDIOLOGY | Facility: CLINIC | Age: 72
End: 2022-10-06
Payer: MEDICARE

## 2022-10-06 DIAGNOSIS — H90.3 SENSORINEURAL HEARING LOSS, BILATERAL: ICD-10-CM

## 2022-10-06 DIAGNOSIS — H90.3 SENSORINEURAL HEARING LOSS, BILATERAL: Primary | ICD-10-CM

## 2022-10-06 PROCEDURE — 99499 UNLISTED E&M SERVICE: CPT | Mod: S$GLB,,, | Performed by: AUDIOLOGIST

## 2022-10-06 PROCEDURE — 92567 TYMPANOMETRY: CPT | Mod: S$GLB,,, | Performed by: AUDIOLOGIST

## 2022-10-06 PROCEDURE — 99999 PR PBB SHADOW E&M-EST. PATIENT-LVL I: CPT | Mod: PBBFAC,,, | Performed by: AUDIOLOGIST

## 2022-10-06 PROCEDURE — 99999 PR PBB SHADOW E&M-EST. PATIENT-LVL I: ICD-10-PCS | Mod: PBBFAC,,, | Performed by: AUDIOLOGIST

## 2022-10-06 PROCEDURE — 92557 COMPREHENSIVE HEARING TEST: CPT | Mod: S$GLB,,, | Performed by: AUDIOLOGIST

## 2022-10-06 PROCEDURE — 92567 PR TYMPA2METRY: ICD-10-PCS | Mod: S$GLB,,, | Performed by: AUDIOLOGIST

## 2022-10-06 PROCEDURE — 92557 PR COMPREHENSIVE HEARING TEST: ICD-10-PCS | Mod: S$GLB,,, | Performed by: AUDIOLOGIST

## 2022-10-06 PROCEDURE — 99499 NO LOS: ICD-10-PCS | Mod: S$GLB,,, | Performed by: AUDIOLOGIST

## 2022-10-06 NOTE — PROGRESS NOTES
Audiologic Evaluation 10/6/2022:       Zion Guillen, a 72 y.o. male, was seen today in the clinic for an audiologic evaluation for hearing loss.  Mr. Guillen reported decreased hearing sensitivity. He reported his wife is complaining that she has to repeat herself frequently. Mr. Guillen denied vertigo, otalgia, and aural fulness.    Tympanometry revealed Type Ad tympanogram in the right ear and Type A tympanogram in the left ear. Audiogram results revealed normal sloping to moderate sensorineural hearing loss in the right ear and normal sloping to moderate sensorineural hearing loss in the left ear.  Speech reception thresholds were noted at 30 dB in the right ear and 30 dB in the left ear.  Speech discrimination scores were 96% in the right ear and 92% in the left ear.    Recommendations:  Otologic evaluation  Hearing aid follow-up  Daily use of hearing aids  Annual audiogram  Hearing protection when in noise

## 2022-10-06 NOTE — PROGRESS NOTES
Hearing Aid Follow-up 10/6/2022:    Mr. Guillen was seen today for a hearing aid follow-up. Mr. Lua feels his hearing has decreased and that he may need his hearing aids adjusted. Mr. Guillen was also seen today for an audiological evaluation. Mr. Guillen's hearing aids were cleaned and checked. Listening check confirmed both devices to be in good working condition. Mr. Guillen's hearing aids were adjusted to today's audiogram at 90% target gain. Mr. Guillen reported good sound quality,    Mr. Guillen's warranty expires in May of 2023. He was counseled to drop his hearing aids off to the hearing aid window in April of 2023 to send the devices to [place] for an end of warranty check.     Recommendations:  Daily use of hearing aids  Follow-up annually or as needed with hearing aids  Annual Audiogram  Send hearing aids off for end of warranty check before the warranty expires

## 2022-10-20 DIAGNOSIS — M17.0 BILATERAL PRIMARY OSTEOARTHRITIS OF KNEE: ICD-10-CM

## 2022-10-20 RX ORDER — DICLOFENAC SODIUM 10 MG/G
2 GEL TOPICAL 4 TIMES DAILY PRN
Qty: 100 G | Refills: 11 | Status: CANCELLED | OUTPATIENT
Start: 2022-10-20

## 2022-12-09 ENCOUNTER — TELEPHONE (OUTPATIENT)
Dept: INTERNAL MEDICINE | Facility: CLINIC | Age: 72
End: 2022-12-09

## 2022-12-16 ENCOUNTER — OFFICE VISIT (OUTPATIENT)
Dept: INTERNAL MEDICINE | Facility: CLINIC | Age: 72
End: 2022-12-16
Payer: MEDICARE

## 2022-12-16 VITALS
OXYGEN SATURATION: 98 % | WEIGHT: 158.31 LBS | HEART RATE: 63 BPM | SYSTOLIC BLOOD PRESSURE: 124 MMHG | HEIGHT: 66 IN | BODY MASS INDEX: 25.44 KG/M2 | DIASTOLIC BLOOD PRESSURE: 80 MMHG

## 2022-12-16 DIAGNOSIS — J00 ACUTE NASOPHARYNGITIS: Primary | ICD-10-CM

## 2022-12-16 DIAGNOSIS — I10 ESSENTIAL HYPERTENSION: ICD-10-CM

## 2022-12-16 DIAGNOSIS — R05.9 COUGH, UNSPECIFIED TYPE: ICD-10-CM

## 2022-12-16 DIAGNOSIS — J30.9 ALLERGIC RHINITIS, UNSPECIFIED SEASONALITY, UNSPECIFIED TRIGGER: ICD-10-CM

## 2022-12-16 LAB — SARS-COV-2 RNA RESP QL NAA+PROBE: NOT DETECTED

## 2022-12-16 PROCEDURE — 4010F ACE/ARB THERAPY RXD/TAKEN: CPT | Mod: CPTII,S$GLB,, | Performed by: INTERNAL MEDICINE

## 2022-12-16 PROCEDURE — 1126F AMNT PAIN NOTED NONE PRSNT: CPT | Mod: CPTII,S$GLB,, | Performed by: INTERNAL MEDICINE

## 2022-12-16 PROCEDURE — 99215 OFFICE O/P EST HI 40 MIN: CPT | Mod: S$GLB,,, | Performed by: INTERNAL MEDICINE

## 2022-12-16 PROCEDURE — 1101F PR PT FALLS ASSESS DOC 0-1 FALLS W/OUT INJ PAST YR: ICD-10-PCS | Mod: CPTII,S$GLB,, | Performed by: INTERNAL MEDICINE

## 2022-12-16 PROCEDURE — 99215 PR OFFICE/OUTPT VISIT, EST, LEVL V, 40-54 MIN: ICD-10-PCS | Mod: S$GLB,,, | Performed by: INTERNAL MEDICINE

## 2022-12-16 PROCEDURE — 1159F MED LIST DOCD IN RCRD: CPT | Mod: CPTII,S$GLB,, | Performed by: INTERNAL MEDICINE

## 2022-12-16 PROCEDURE — 3008F PR BODY MASS INDEX (BMI) DOCUMENTED: ICD-10-PCS | Mod: CPTII,S$GLB,, | Performed by: INTERNAL MEDICINE

## 2022-12-16 PROCEDURE — 3079F DIAST BP 80-89 MM HG: CPT | Mod: CPTII,S$GLB,, | Performed by: INTERNAL MEDICINE

## 2022-12-16 PROCEDURE — U0003 INFECTIOUS AGENT DETECTION BY NUCLEIC ACID (DNA OR RNA); SEVERE ACUTE RESPIRATORY SYNDROME CORONAVIRUS 2 (SARS-COV-2) (CORONAVIRUS DISEASE [COVID-19]), AMPLIFIED PROBE TECHNIQUE, MAKING USE OF HIGH THROUGHPUT TECHNOLOGIES AS DESCRIBED BY CMS-2020-01-R: HCPCS | Performed by: INTERNAL MEDICINE

## 2022-12-16 PROCEDURE — 3008F BODY MASS INDEX DOCD: CPT | Mod: CPTII,S$GLB,, | Performed by: INTERNAL MEDICINE

## 2022-12-16 PROCEDURE — 3079F PR MOST RECENT DIASTOLIC BLOOD PRESSURE 80-89 MM HG: ICD-10-PCS | Mod: CPTII,S$GLB,, | Performed by: INTERNAL MEDICINE

## 2022-12-16 PROCEDURE — 3288F FALL RISK ASSESSMENT DOCD: CPT | Mod: CPTII,S$GLB,, | Performed by: INTERNAL MEDICINE

## 2022-12-16 PROCEDURE — 1160F PR REVIEW ALL MEDS BY PRESCRIBER/CLIN PHARMACIST DOCUMENTED: ICD-10-PCS | Mod: CPTII,S$GLB,, | Performed by: INTERNAL MEDICINE

## 2022-12-16 PROCEDURE — U0005 INFEC AGEN DETEC AMPLI PROBE: HCPCS | Performed by: INTERNAL MEDICINE

## 2022-12-16 PROCEDURE — 3288F PR FALLS RISK ASSESSMENT DOCUMENTED: ICD-10-PCS | Mod: CPTII,S$GLB,, | Performed by: INTERNAL MEDICINE

## 2022-12-16 PROCEDURE — 99999 PR PBB SHADOW E&M-EST. PATIENT-LVL IV: CPT | Mod: PBBFAC,,, | Performed by: INTERNAL MEDICINE

## 2022-12-16 PROCEDURE — 1159F PR MEDICATION LIST DOCUMENTED IN MEDICAL RECORD: ICD-10-PCS | Mod: CPTII,S$GLB,, | Performed by: INTERNAL MEDICINE

## 2022-12-16 PROCEDURE — 4010F PR ACE/ARB THEARPY RXD/TAKEN: ICD-10-PCS | Mod: CPTII,S$GLB,, | Performed by: INTERNAL MEDICINE

## 2022-12-16 PROCEDURE — 1160F RVW MEDS BY RX/DR IN RCRD: CPT | Mod: CPTII,S$GLB,, | Performed by: INTERNAL MEDICINE

## 2022-12-16 PROCEDURE — 3074F PR MOST RECENT SYSTOLIC BLOOD PRESSURE < 130 MM HG: ICD-10-PCS | Mod: CPTII,S$GLB,, | Performed by: INTERNAL MEDICINE

## 2022-12-16 PROCEDURE — 1126F PR PAIN SEVERITY QUANTIFIED, NO PAIN PRESENT: ICD-10-PCS | Mod: CPTII,S$GLB,, | Performed by: INTERNAL MEDICINE

## 2022-12-16 PROCEDURE — 1101F PT FALLS ASSESS-DOCD LE1/YR: CPT | Mod: CPTII,S$GLB,, | Performed by: INTERNAL MEDICINE

## 2022-12-16 PROCEDURE — 99999 PR PBB SHADOW E&M-EST. PATIENT-LVL IV: ICD-10-PCS | Mod: PBBFAC,,, | Performed by: INTERNAL MEDICINE

## 2022-12-16 PROCEDURE — 3074F SYST BP LT 130 MM HG: CPT | Mod: CPTII,S$GLB,, | Performed by: INTERNAL MEDICINE

## 2022-12-16 RX ORDER — CODEINE PHOSPHATE AND GUAIFENESIN 10; 100 MG/5ML; MG/5ML
5-10 SOLUTION ORAL EVERY 4 HOURS PRN
Qty: 118 ML | Refills: 0 | Status: SHIPPED | OUTPATIENT
Start: 2022-12-16 | End: 2022-12-26

## 2022-12-16 RX ORDER — BENZONATATE 200 MG/1
200 CAPSULE ORAL 3 TIMES DAILY PRN
Qty: 30 CAPSULE | Refills: 1 | Status: SHIPPED | OUTPATIENT
Start: 2022-12-16 | End: 2023-01-15 | Stop reason: SDUPTHER

## 2022-12-16 RX ORDER — GUAIFENESIN 600 MG/1
600 TABLET, EXTENDED RELEASE ORAL 2 TIMES DAILY
Qty: 30 TABLET | Refills: 1 | Status: SHIPPED | OUTPATIENT
Start: 2022-12-16 | End: 2023-08-28 | Stop reason: ALTCHOICE

## 2022-12-16 RX ORDER — FLUTICASONE PROPIONATE 50 MCG
1 SPRAY, SUSPENSION (ML) NASAL DAILY
Qty: 16 G | Refills: 11 | Status: SHIPPED | OUTPATIENT
Start: 2022-12-16 | End: 2023-03-08 | Stop reason: SDUPTHER

## 2022-12-16 RX ORDER — PREDNISONE 20 MG/1
40 TABLET ORAL DAILY
Qty: 10 TABLET | Refills: 0 | Status: SHIPPED | OUTPATIENT
Start: 2022-12-16 | End: 2022-12-21

## 2022-12-16 RX ORDER — ALBUTEROL SULFATE 90 UG/1
2 AEROSOL, METERED RESPIRATORY (INHALATION) EVERY 6 HOURS PRN
Qty: 18 G | Refills: 3 | Status: SHIPPED | OUTPATIENT
Start: 2022-12-16 | End: 2023-01-18 | Stop reason: SDUPTHER

## 2022-12-16 NOTE — PATIENT INSTRUCTIONS
I recommend plenty of rest and drinking plenty of fluids.  Tylenol and NSAIDs, such as ibuprofen, Motrin, naproxen can be helpful for sore throat, headache, ear pain, muscle and joint pain, sneezing, fatigue.  Chloroseptic spray, lozenges can also soothe a sore throat.  Over-the-counter antihistamines (Allegra, Claritin, Zyrtec) for runny nose.  Nasal saline once to twice a day.  Hand washing can help prevent the spread of respiratory illnesses, especially from younger children.    If you have high blood pressure you can not take Sudafed, pseudoephedrine, or any medications with DM (dextromethorphan) because it will elevate your blood pressure. You may take Coricidin HBP over the counter.      [facility] (across from [address])  [address] [place] [address]    All of your core healthy metrics are met.      Gregg Donovan,     If you are due for any health screening(s) below please notify me so we can arrange them to be ordered and scheduled to maintain your health. Most healthy patients complete it. Don't lose out on improving your health.     All of your core healthy metrics are met.

## 2022-12-16 NOTE — PROGRESS NOTES
Subjective:       Patient ID: Zion Guillen is a 72 y.o. male who  has a past medical history of Arthritis, BCC (basal cell carcinoma of skin) (7/1/2022), BPH (benign prostatic hyperplasia), Hyperlipidemia, and Hypertension.    Chief Complaint: Recurrent Sinusitis    History was obtained from the patient and supplemented through chart review.  There were no ER or clinic visits since our last appointment.    He is from [facility].  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  His wife is Brenda Sotomayor (teaches at [employer]).    HPI    He is concerned for recurrent sinus infections.  Last episode Aug/Sept 2022.    Intermittent sx. Some relief x 3 days.  Sneezing, rhinorrhea, postnasal drip. Some throat; using mouth rinse/gargle. Some fatigue. Mucous, coughing.  + cough + difficulty expectorating sputum. +coughing spells,+ difficulty sleeping at night.  No fever. Some myalgias.  No CP. Slight chest congestion. Occasional wheezing.    No sick contacts.    Did home COVID test x 2 last mo which were neg.  UTD flu vaccine. COVID vaccine x 4.    Has been taking mucinex, mucinex sinus max with temporary relief.  Tried Astelin, but caused much more rhinitis for a few days.    He will be leaving soon for [place].    AR:   To pollen, dust.  Takes Zyrtec.   Usually occurs 1/year. Not associated with time of year.    HTN:    Pt's BP is controlled on Norvasc 10, losartan 25. Tolerating meds well. Taking meds consistently qAM.  Home BP: has an arm cuff.     ETOH: 1-2 drinks, 4/week  Exercise:  Walks daily x 3 miles. Swims 1 km 2-3/week, but pool at [facility] closed x 2 mo. Pool at [facility] also isn't open. Increased walking.   Doing well with diet, exercise. Monitor CMP.                Not addressed today.  H/o dizziness with positional changes. Saw Neurology.  Not consistent with true vertigo/spinning.  MRI/MRA brain 1/2021 with chronic microvascular ischemic changes.    Aortic atherosclerosis, HLD:    Is currently taking Lipitor  increased to 40 d/t ASCVD. His dad passed away around his age d/t MI.  Diet: No meat, chicken. Eats fish. Lynch 3/year.  Eats bread, cheese. Eats veggies. Fruit daily for breakfast.  FLP controlled. Increased Lipitor 40 d/t ASCVD. Doing well with diet.  Monitor FLP annually.  Lab Results   Component Value Date    LDLCALC 97.6 01/24/2022     The 10-year ASCVD risk score (Katy WILLIAMSON, et al., 2019) is: 22.6%    Values used to calculate the score:      Age: 72 years      Sex: Male      Is Non- : No      Diabetic: No      Tobacco smoker: No      Systolic Blood Pressure: 124 mmHg      Is BP treated: Yes      HDL Cholesterol: 51 mg/dL      Total Cholesterol: 173 mg/dL    Overweight:  Diet, exercise as above.  Stable. Doing well with diet, exercise. Check A1C with next labs.  BMI Readings from Last 3 Encounters:   12/16/22 25.55 kg/m²   09/27/22 (P) 25.37 kg/m²   07/01/22 25.98 kg/m²     No results found for: HGBA1C    Bili, Na borderline high. Fasted for 10 hours before labs. Didn't drink much water.  CBC, CMP otherwise wnl.  Slightly elevated. Likely hypovolemic hypernatremia d/t fasting for labs.  Monitor.    Vitamin D deficiency: Is taking supplements, 2000 units.  Vitamin-D level WNL.  Cont supplements.    Lab Results   Component Value Date    KTCBKBUG09EB 49 01/24/2022    [id number] 41 12/15/2020    [id number] 40 09/11/2019     C scope in 5/15/2019 in [place] that was reportedly normal. Repeat in 10 years. Will no longer indicated by age.    BPH:  S/p TURP in 2018.    BCC:  Following c [facility] dermatology for BCC of the back. S/p excision. Following annually for TBSE  S/p excision. F/u [facility].    B/l knee OA:  Hurts to go up and down stairs. Sx do not occur daily.  Not persistent. Sx resolved even with walking. X-ray with mild joint space narrowing.  H/o L meniscus repair.  Controlled with PO Voltaren. OSH Ortho recommended medical management rather than surgery.  Intermittent.  "Try topical Voltaren first, PO voltaren 2nd line. F/u with Ortho PRN; consider steroid injections if sx worsen.    Chronic LBP:  Intermittent, but can be more significant at times. Can occur with position change. Sometimes has to lie down, take diclofenac and then it resolves. Takes it on days with increased activity.  Takes it 2 day/week. Doesn't impair his activities.  Swimming helps.   Intermittent. May take OTC analgesics PRN; is not taking excessively. Consider referral to back/spine clinic.    Review of Systems   Constitutional:  Negative for activity change and fever.   Respiratory:  Positive for cough and wheezing.    Cardiovascular:  Negative for leg swelling.   Musculoskeletal:  Negative for gait problem and myalgias.   Skin:  Negative for rash and wound.   Hematological:  Negative for adenopathy.   Psychiatric/Behavioral:  Positive for sleep disturbance. Negative for confusion.        I personally reviewed Past Medical History, Past Surgical History, Social History, and Family History.    Objective:      Vitals:    12/16/22 1022   BP: 124/80   Pulse: 63   SpO2: 98%   Weight: 71.8 kg (158 lb 4.6 oz)   Height: 5' 6" (1.676 m)          Physical Exam  Constitutional:       General: He is not in acute distress.     Appearance: He is well-developed. He is not diaphoretic.   HENT:      Head: Normocephalic and atraumatic.      Nose: Mucosal edema and rhinorrhea present.      Mouth/Throat:      Pharynx: Uvula midline. Posterior oropharyngeal erythema present. No oropharyngeal exudate.   Eyes:      General: No scleral icterus.        Right eye: No discharge.         Left eye: No discharge.      Conjunctiva/sclera:      Right eye: Right conjunctiva is not injected.      Left eye: Left conjunctiva is not injected.   Neck:      Thyroid: No thyromegaly.      Trachea: No tracheal deviation.   Cardiovascular:      Rate and Rhythm: Normal rate and regular rhythm.      Heart sounds: Normal heart sounds. No murmur " heard.  Pulmonary:      Effort: Pulmonary effort is normal. No respiratory distress.      Breath sounds: Normal breath sounds. No stridor. No wheezing or rhonchi.      Comments: Intermittent coughing  Abdominal:      General: Bowel sounds are normal. There is no distension.      Palpations: Abdomen is soft.      Tenderness: There is no abdominal tenderness.   Musculoskeletal:         General: No deformity.      Cervical back: Neck supple.      Right lower leg: No edema.      Left lower leg: No edema.   Lymphadenopathy:      Cervical: No cervical adenopathy.   Skin:     General: Skin is warm and dry.      Findings: No erythema.   Neurological:      Mental Status: He is alert.      Gait: Gait normal.   Psychiatric:         Behavior: Behavior normal.         Lab Results   Component Value Date    WBC 5.15 01/24/2022    HGB 15.9 01/24/2022    HCT 47.0 01/24/2022     01/24/2022    CHOL 173 01/24/2022    TRIG 122 01/24/2022    HDL 51 01/24/2022    ALT 31 01/24/2022    AST 32 01/24/2022     (H) 01/24/2022    K 5.0 01/24/2022     01/24/2022    CREATININE 0.9 01/24/2022    BUN 10 01/24/2022    CO2 27 01/24/2022       The 10-year ASCVD risk score (Katy DK, et al., 2019) is: 22.6%    Values used to calculate the score:      Age: 72 years      Sex: Male      Is Non- : No      Diabetic: No      Tobacco smoker: No      Systolic Blood Pressure: 124 mmHg      Is BP treated: Yes      HDL Cholesterol: 51 mg/dL      Total Cholesterol: 173 mg/dL    (Imaging have been independently reviewed)  MRI brain with chronic microvascular ischemic changes.  No acute abnormality.    Assessment:       1. Acute nasopharyngitis    2. Cough, unspecified type    3. Allergic rhinitis, unspecified seasonality, unspecified trigger    4. Essential hypertension              Plan:       Zion was seen today for recurrent sinusitis.    Diagnoses and all orders for this visit:    Acute  nasopharyngitis  Comments:  Likely viral. Check COVID to be complete. Rec hydration, antihistamines. Discussed correct use of Flonase. Steroids, albuterol d/t mild wheezing.  Orders:  -     COVID-19 Routine Screening; Future  -     fluticasone propionate (FLONASE) 50 mcg/actuation nasal spray; 1 spray (50 mcg total) by Each Nostril route once daily.  -     predniSONE (DELTASONE) 20 MG tablet; Take 2 tablets (40 mg total) by mouth once daily. for 5 days  -     albuterol (VENTOLIN HFA) 90 mcg/actuation inhaler; Inhale 2 puffs into the lungs every 6 (six) hours as needed for Wheezing. Rescue  -     guaiFENesin-codeine 100-10 mg/5 ml (TUSSI-ORGANIDIN NR)  mg/5 mL syrup; Take 5-10 mLs by mouth every 4 (four) hours as needed for Cough. Max 60mL/24 hours  -     guaiFENesin (MUCINEX) 600 mg 12 hr tablet; Take 1 tablet (600 mg total) by mouth 2 (two) times daily.  -     benzonatate (TESSALON) 200 MG capsule; Take 1 capsule (200 mg total) by mouth 3 (three) times daily as needed for Cough.  -     COVID-19 Routine Screening    Cough, unspecified type  Comments:  As above. Guaifenesin, tessalon for cough. Cough syrup qHS PRN; discussed potential sedation.  Orders:  -     guaiFENesin-codeine 100-10 mg/5 ml (TUSSI-ORGANIDIN NR)  mg/5 mL syrup; Take 5-10 mLs by mouth every 4 (four) hours as needed for Cough. Max 60mL/24 hours  -     guaiFENesin (MUCINEX) 600 mg 12 hr tablet; Take 1 tablet (600 mg total) by mouth 2 (two) times daily.  -     benzonatate (TESSALON) 200 MG capsule; Take 1 capsule (200 mg total) by mouth 3 (three) times daily as needed for Cough.    Allergic rhinitis, unspecified seasonality, unspecified trigger  Comments:  Cont Antihistamine daily PRN. Discussed correct use of Flonase.   Orders:  -     fluticasone propionate (FLONASE) 50 mcg/actuation nasal spray; 1 spray (50 mcg total) by Each Nostril route once daily.    Essential hypertension  Comments:  Controlled. Cont Norvasc 10, losartan 25. Avoid  DM, decongestants. Monitor home BP. Encouraged trying [facility] for the pool.             Side effects of medication(s) were discussed in detail and patient voiced understanding.  Patient will call back for any issues or complications.     I have spent a total of 40 minutes with the patient as well as reviewing the chart/medical record and placing orders on the day of the visit. Discussed URI, cough, AR.     Keep appt for HTN with labs prior.

## 2022-12-21 ENCOUNTER — LAB VISIT (OUTPATIENT)
Dept: LAB | Facility: OTHER | Age: 72
End: 2022-12-21
Attending: INTERNAL MEDICINE
Payer: MEDICARE

## 2022-12-21 DIAGNOSIS — Z00.00 ANNUAL PHYSICAL EXAM: ICD-10-CM

## 2022-12-21 DIAGNOSIS — I70.0 AORTIC ATHEROSCLEROSIS: ICD-10-CM

## 2022-12-21 DIAGNOSIS — E66.3 OVERWEIGHT (BMI 25.0-29.9): ICD-10-CM

## 2022-12-21 DIAGNOSIS — I10 ESSENTIAL HYPERTENSION: ICD-10-CM

## 2022-12-21 LAB
ALBUMIN SERPL BCP-MCNC: 3.9 G/DL (ref 3.5–5.2)
ALP SERPL-CCNC: 54 U/L (ref 55–135)
ALT SERPL W/O P-5'-P-CCNC: 41 U/L (ref 10–44)
ANION GAP SERPL CALC-SCNC: 8 MMOL/L (ref 8–16)
AST SERPL-CCNC: 38 U/L (ref 10–40)
BASOPHILS # BLD AUTO: 0.03 K/UL (ref 0–0.2)
BASOPHILS NFR BLD: 0.4 % (ref 0–1.9)
BILIRUB SERPL-MCNC: 0.7 MG/DL (ref 0.1–1)
BUN SERPL-MCNC: 15 MG/DL (ref 8–23)
CALCIUM SERPL-MCNC: 9.3 MG/DL (ref 8.7–10.5)
CHLORIDE SERPL-SCNC: 109 MMOL/L (ref 95–110)
CHOLEST SERPL-MCNC: 141 MG/DL (ref 120–199)
CHOLEST/HDLC SERPL: 2.3 {RATIO} (ref 2–5)
CO2 SERPL-SCNC: 29 MMOL/L (ref 23–29)
CREAT SERPL-MCNC: 0.9 MG/DL (ref 0.5–1.4)
DIFFERENTIAL METHOD: ABNORMAL
EOSINOPHIL # BLD AUTO: 0 K/UL (ref 0–0.5)
EOSINOPHIL NFR BLD: 0.6 % (ref 0–8)
ERYTHROCYTE [DISTWIDTH] IN BLOOD BY AUTOMATED COUNT: 12.9 % (ref 11.5–14.5)
EST. GFR  (NO RACE VARIABLE): >60 ML/MIN/1.73 M^2
ESTIMATED AVG GLUCOSE: 108 MG/DL (ref 68–131)
GLUCOSE SERPL-MCNC: 87 MG/DL (ref 70–110)
HBA1C MFR BLD: 5.4 % (ref 4–5.6)
HCT VFR BLD AUTO: 43.6 % (ref 40–54)
HDLC SERPL-MCNC: 61 MG/DL (ref 40–75)
HDLC SERPL: 43.3 % (ref 20–50)
HGB BLD-MCNC: 14.9 G/DL (ref 14–18)
IMM GRANULOCYTES # BLD AUTO: 0.06 K/UL (ref 0–0.04)
IMM GRANULOCYTES NFR BLD AUTO: 0.9 % (ref 0–0.5)
LDLC SERPL CALC-MCNC: 64 MG/DL (ref 63–159)
LYMPHOCYTES # BLD AUTO: 2.2 K/UL (ref 1–4.8)
LYMPHOCYTES NFR BLD: 32.2 % (ref 18–48)
MCH RBC QN AUTO: 32.7 PG (ref 27–31)
MCHC RBC AUTO-ENTMCNC: 34.2 G/DL (ref 32–36)
MCV RBC AUTO: 96 FL (ref 82–98)
MONOCYTES # BLD AUTO: 0.7 K/UL (ref 0.3–1)
MONOCYTES NFR BLD: 10.2 % (ref 4–15)
NEUTROPHILS # BLD AUTO: 3.8 K/UL (ref 1.8–7.7)
NEUTROPHILS NFR BLD: 55.7 % (ref 38–73)
NONHDLC SERPL-MCNC: 80 MG/DL
NRBC BLD-RTO: 0 /100 WBC
PLATELET # BLD AUTO: 223 K/UL (ref 150–450)
PMV BLD AUTO: 10 FL (ref 9.2–12.9)
POTASSIUM SERPL-SCNC: 3.9 MMOL/L (ref 3.5–5.1)
PROT SERPL-MCNC: 6.7 G/DL (ref 6–8.4)
RBC # BLD AUTO: 4.56 M/UL (ref 4.6–6.2)
SODIUM SERPL-SCNC: 146 MMOL/L (ref 136–145)
TRIGL SERPL-MCNC: 80 MG/DL (ref 30–150)
WBC # BLD AUTO: 6.78 K/UL (ref 3.9–12.7)

## 2022-12-21 PROCEDURE — 83036 HEMOGLOBIN GLYCOSYLATED A1C: CPT | Performed by: INTERNAL MEDICINE

## 2022-12-21 PROCEDURE — 80061 LIPID PANEL: CPT | Performed by: INTERNAL MEDICINE

## 2022-12-21 PROCEDURE — 85025 COMPLETE CBC W/AUTO DIFF WBC: CPT | Performed by: INTERNAL MEDICINE

## 2022-12-21 PROCEDURE — 36415 COLL VENOUS BLD VENIPUNCTURE: CPT | Performed by: INTERNAL MEDICINE

## 2022-12-21 PROCEDURE — 80053 COMPREHEN METABOLIC PANEL: CPT | Performed by: INTERNAL MEDICINE

## 2023-01-17 ENCOUNTER — OFFICE VISIT (OUTPATIENT)
Dept: INTERNAL MEDICINE | Facility: CLINIC | Age: 73
End: 2023-01-17
Payer: MEDICARE

## 2023-01-17 VITALS
HEIGHT: 66 IN | OXYGEN SATURATION: 97 % | BODY MASS INDEX: 25.15 KG/M2 | HEART RATE: 59 BPM | WEIGHT: 156.5 LBS | SYSTOLIC BLOOD PRESSURE: 122 MMHG | DIASTOLIC BLOOD PRESSURE: 72 MMHG

## 2023-01-17 DIAGNOSIS — S93.402D SPRAIN OF LEFT ANKLE, UNSPECIFIED LIGAMENT, SUBSEQUENT ENCOUNTER: ICD-10-CM

## 2023-01-17 DIAGNOSIS — I10 ESSENTIAL HYPERTENSION: ICD-10-CM

## 2023-01-17 DIAGNOSIS — Z00.00 ANNUAL PHYSICAL EXAM: Primary | ICD-10-CM

## 2023-01-17 DIAGNOSIS — I70.0 AORTIC ATHEROSCLEROSIS: ICD-10-CM

## 2023-01-17 DIAGNOSIS — E55.9 VITAMIN D DEFICIENCY: ICD-10-CM

## 2023-01-17 DIAGNOSIS — E66.3 OVERWEIGHT (BMI 25.0-29.9): ICD-10-CM

## 2023-01-17 PROCEDURE — 99397 PR PREVENTIVE VISIT,EST,65 & OVER: ICD-10-PCS | Mod: HCNC,S$GLB,, | Performed by: INTERNAL MEDICINE

## 2023-01-17 PROCEDURE — 3008F PR BODY MASS INDEX (BMI) DOCUMENTED: ICD-10-PCS | Mod: HCNC,CPTII,S$GLB, | Performed by: INTERNAL MEDICINE

## 2023-01-17 PROCEDURE — 99999 PR PBB SHADOW E&M-EST. PATIENT-LVL IV: CPT | Mod: PBBFAC,HCNC,, | Performed by: INTERNAL MEDICINE

## 2023-01-17 PROCEDURE — 3074F PR MOST RECENT SYSTOLIC BLOOD PRESSURE < 130 MM HG: ICD-10-PCS | Mod: HCNC,CPTII,S$GLB, | Performed by: INTERNAL MEDICINE

## 2023-01-17 PROCEDURE — 1125F PR PAIN SEVERITY QUANTIFIED, PAIN PRESENT: ICD-10-PCS | Mod: HCNC,CPTII,S$GLB, | Performed by: INTERNAL MEDICINE

## 2023-01-17 PROCEDURE — 1160F RVW MEDS BY RX/DR IN RCRD: CPT | Mod: HCNC,CPTII,S$GLB, | Performed by: INTERNAL MEDICINE

## 2023-01-17 PROCEDURE — 1101F PT FALLS ASSESS-DOCD LE1/YR: CPT | Mod: HCNC,CPTII,S$GLB, | Performed by: INTERNAL MEDICINE

## 2023-01-17 PROCEDURE — 3008F BODY MASS INDEX DOCD: CPT | Mod: HCNC,CPTII,S$GLB, | Performed by: INTERNAL MEDICINE

## 2023-01-17 PROCEDURE — 1159F MED LIST DOCD IN RCRD: CPT | Mod: HCNC,CPTII,S$GLB, | Performed by: INTERNAL MEDICINE

## 2023-01-17 PROCEDURE — 1159F PR MEDICATION LIST DOCUMENTED IN MEDICAL RECORD: ICD-10-PCS | Mod: HCNC,CPTII,S$GLB, | Performed by: INTERNAL MEDICINE

## 2023-01-17 PROCEDURE — 1160F PR REVIEW ALL MEDS BY PRESCRIBER/CLIN PHARMACIST DOCUMENTED: ICD-10-PCS | Mod: HCNC,CPTII,S$GLB, | Performed by: INTERNAL MEDICINE

## 2023-01-17 PROCEDURE — 1101F PR PT FALLS ASSESS DOC 0-1 FALLS W/OUT INJ PAST YR: ICD-10-PCS | Mod: HCNC,CPTII,S$GLB, | Performed by: INTERNAL MEDICINE

## 2023-01-17 PROCEDURE — 3078F DIAST BP <80 MM HG: CPT | Mod: HCNC,CPTII,S$GLB, | Performed by: INTERNAL MEDICINE

## 2023-01-17 PROCEDURE — 99397 PER PM REEVAL EST PAT 65+ YR: CPT | Mod: HCNC,S$GLB,, | Performed by: INTERNAL MEDICINE

## 2023-01-17 PROCEDURE — 1125F AMNT PAIN NOTED PAIN PRSNT: CPT | Mod: HCNC,CPTII,S$GLB, | Performed by: INTERNAL MEDICINE

## 2023-01-17 PROCEDURE — 3078F PR MOST RECENT DIASTOLIC BLOOD PRESSURE < 80 MM HG: ICD-10-PCS | Mod: HCNC,CPTII,S$GLB, | Performed by: INTERNAL MEDICINE

## 2023-01-17 PROCEDURE — 3074F SYST BP LT 130 MM HG: CPT | Mod: HCNC,CPTII,S$GLB, | Performed by: INTERNAL MEDICINE

## 2023-01-17 PROCEDURE — 3288F FALL RISK ASSESSMENT DOCD: CPT | Mod: HCNC,CPTII,S$GLB, | Performed by: INTERNAL MEDICINE

## 2023-01-17 PROCEDURE — 3288F PR FALLS RISK ASSESSMENT DOCUMENTED: ICD-10-PCS | Mod: HCNC,CPTII,S$GLB, | Performed by: INTERNAL MEDICINE

## 2023-01-17 PROCEDURE — 99999 PR PBB SHADOW E&M-EST. PATIENT-LVL IV: ICD-10-PCS | Mod: PBBFAC,HCNC,, | Performed by: INTERNAL MEDICINE

## 2023-01-17 NOTE — PROGRESS NOTES
Subjective:       Patient ID: Zion Guillen is a 72 y.o. male who  has a past medical history of Arthritis, BCC (basal cell carcinoma of skin) (7/1/2022), BPH (benign prostatic hyperplasia), Hyperlipidemia, and Hypertension.    Chief Complaint: Joint Swelling    History was obtained from the patient and supplemented through chart review.  There were no ER or clinic visits since our last appointment.    He is from [facility].  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  His wife is Brenda Sotomayor (teaches at [employer]).    HPI    L ankle sprain:  Occurred about 4 days ago on the last day of his trip in [place]. Was active, hiking.  Was tubing in a cave and was hard to keep his balance so he twisted his ankle.  He then [place] back [place] the [place] the [place] after.  He has been using Topical diclofenac and PO Voltaren 25 mg with daily improvement.  Pain, edema has improved.  Can be hard to walk on uneven ground.    HTN:    Pt's BP is controlled on Norvasc 10, losartan 25. Tolerating meds well. Taking meds consistently qAM.  Home BP: has an arm cuff.     ETOH: 1-2 drinks, 4/week  Exercise:  Walks daily x 3 miles. Swims 1 km 2-3/week. Walking.     Aortic atherosclerosis, HLD:    Is taking Lipitor 40 d/t ASCVD. His dad passed away around his age d/t MI.  Diet: No meat, chicken. Eats fish. Lynch 3/year.  Eats bread, cheese. Eats veggies. Fruit daily for breakfast.  Lab Results   Component Value Date    LDLCALC 64.0 12/21/2022     The 10-year ASCVD risk score (Katy WILLIAMSON, et al., 2019) is: 19.1%    Values used to calculate the score:      Age: 72 years      Sex: Male      Is Non- : No      Diabetic: No      Tobacco smoker: No      Systolic Blood Pressure: 122 mmHg      Is BP treated: Yes      HDL Cholesterol: 61 mg/dL      Total Cholesterol: 141 mg/dL    Overweight:  Diet, exercise as above.  BMI Readings from Last 3 Encounters:   01/17/23 25.26 kg/m²   12/16/22 25.55 kg/m²   09/27/22 (P) 25.37 kg/m²      Lab Results   Component Value Date/Time    HGBA1C 5.4 12/21/2022 07:47 AM     Vitamin D deficiency: Is taking supplements, 2000 units.  Lab Results   Component Value Date    [id number] 49 01/24/2022    [id number] 41 12/15/2020    QXBNHMKD47WI 40 09/11/2019     Na slightly high. Was fasting for labs. CMP otherwise wnl.              Not addressed today.  AR:   To pollen, dust.  Takes Zyrtec.   Usually occurs 1/year. Not associated with time of year.  Cont Antihistamine daily PRN. Discussed correct use of Flonase.     H/o dizziness with positional changes. Saw Neurology.  Not consistent with true vertigo/spinning.  MRI/MRA brain 1/2021 with chronic microvascular ischemic changes.    C scope in 5/15/2019 in [place] that was reportedly normal. Repeat in 10 years. Will no longer indicated by age.    BPH:  S/p TURP in 2018.    BCC:  Following c [facility] dermatology for BCC of the back. S/p excision. Following annually for TBSE  S/p excision. F/u [facility].    B/l knee OA:  Hurts to go up and down stairs. Sx do not occur daily.  Not persistent. Sx resolved even with walking. X-ray with mild joint space narrowing.  H/o L meniscus repair.  Controlled with PO Voltaren. OSH Ortho recommended medical management rather than surgery.  Intermittent. Try topical Voltaren first, PO voltaren 2nd line. F/u with Ortho PRN; consider steroid injections if sx worsen.    Chronic LBP:  Intermittent, but can be more significant at times. Can occur with position change. Sometimes has to lie down, take diclofenac and then it resolves. Takes it on days with increased activity.  Takes it 2 day/week. Doesn't impair his activities.  Swimming helps.   Intermittent. May take OTC analgesics PRN; is not taking excessively. Consider referral to back/spine clinic.    Review of Systems   Constitutional:  Positive for activity change. Negative for appetite change.   Respiratory:  Negative for wheezing.    Cardiovascular:  Negative for leg  "swelling.   Musculoskeletal:  Positive for arthralgias, back pain and joint swelling. Negative for gait problem.   Skin:  Negative for rash and wound.   Psychiatric/Behavioral:  Negative for confusion. The patient is not nervous/anxious.        I personally reviewed Past Medical History, Past Surgical History, Social History, and Family History.    Objective:      Vitals:    01/17/23 0857   BP: 122/72   Pulse: (!) 59   SpO2: 97%   Weight: 71 kg (156 lb 8.4 oz)   Height: 5' 6" (1.676 m)            Physical Exam  Constitutional:       General: He is not in acute distress.     Appearance: He is not diaphoretic.   HENT:      Head: Normocephalic and atraumatic.   Eyes:      General: No scleral icterus.        Right eye: No discharge.         Left eye: No discharge.   Cardiovascular:      Rate and Rhythm: Normal rate and regular rhythm.      Pulses:           Posterior tibial pulses are 2+ on the right side and 2+ on the left side.      Heart sounds: Normal heart sounds. No murmur heard.  Pulmonary:      Effort: Pulmonary effort is normal. No respiratory distress.      Breath sounds: Normal breath sounds. No wheezing.   Abdominal:      General: Bowel sounds are normal. There is no distension.      Palpations: Abdomen is soft.      Tenderness: There is no abdominal tenderness.   Musculoskeletal:         General: No deformity.      Right lower leg: No edema.      Left lower leg: Edema (very mild distally. No calf TTP) present.      Left foot: Normal range of motion. No deformity.        Feet:    Feet:      Right foot:      Skin integrity: No erythema.      Left foot:      Skin integrity: No erythema or warmth.   Skin:     General: Skin is warm and dry.      Findings: No erythema.   Neurological:      Mental Status: He is alert.      Gait: Gait normal.   Psychiatric:         Behavior: Behavior normal.         Lab Results   Component Value Date    WBC 6.78 12/21/2022    HGB 14.9 12/21/2022    HCT 43.6 12/21/2022     " 12/21/2022    CHOL 141 12/21/2022    TRIG 80 12/21/2022    HDL 61 12/21/2022    ALT 41 12/21/2022    AST 38 12/21/2022     (H) 12/21/2022    K 3.9 12/21/2022     12/21/2022    CREATININE 0.9 12/21/2022    BUN 15 12/21/2022    CO2 29 12/21/2022    HGBA1C 5.4 12/21/2022       The 10-year ASCVD risk score (Katy WILLIAMSON, et al., 2019) is: 19.1%    Values used to calculate the score:      Age: 72 years      Sex: Male      Is Non- : No      Diabetic: No      Tobacco smoker: No      Systolic Blood Pressure: 122 mmHg      Is BP treated: Yes      HDL Cholesterol: 61 mg/dL      Total Cholesterol: 141 mg/dL    (Imaging have been independently reviewed)  MRI brain with chronic microvascular ischemic changes.  No acute abnormality.    Assessment:       1. Annual physical exam    2. Sprain of left ankle, unspecified ligament, subsequent encounter    3. Essential hypertension    4. Aortic atherosclerosis    5. Overweight (BMI 25.0-29.9)    6. Vitamin D deficiency                Plan:       Zion was seen today for joint swelling.    Diagnoses and all orders for this visit:    Annual physical exam    Sprain of left ankle, unspecified ligament, subsequent encounter  Comments:  Improved.  May take PO, topical NSAIDs PRN. RICE. Exercise as tolerated.    Essential hypertension  Comments:  Controlled. Cont Norvasc 10, losartan 25. Monitor home BP. Doing well with diet, exercise.     Aortic atherosclerosis  Comments:  FLP controlled. Cont Lipitor 40 d/t ASCVD. Doing well with diet.  Monitor FLP annually.    Overweight (BMI 25.0-29.9)  Comments:  Stable. Doing well with diet, exercise. A1C wnl.     Vitamin D deficiency  Comments:  Vitamin-D level WNL.  Cont supplements.                Side effects of medication(s) were discussed in detail and patient voiced understanding.  Patient will call back for any issues or complications.     Health Maintenance   Topic Date Due    TETANUS VACCINE  01/01/2024     High Dose Statin  01/17/2024    Lipid Panel  12/21/2027    Hepatitis C Screening  Completed    Abdominal Aortic Aneurysm Screening  Completed     RTC in 6 month(s) or sooner PRN for HTN.

## 2023-01-17 NOTE — PATIENT INSTRUCTIONS
Over-the-counter NSAIDs such as Aleve, ibuprofen, naproxen, Motrin, goodies powder.    R.I.C.E.    R.I.C.E. stands for Rest, Ice, Compression, and Elevation. Doing these things helps limit pain and swelling after an injury. R.I.C.E. also helps injuries heal faster. Use R.I.C.E. for sprains, strains, and severe bruises or bumps. Follow the tips on this handout and begin R.I.C.E. as soon as possible after an injury.  [] Rest  Pain is your bodys way of telling you to rest an injured area. Whether you have hurt an elbow, hand, foot, or knee, limiting its use will prevent further injury and help you heal.  [] Ice  Applying ice right after an injury helps prevent swelling and reduce pain. Dont place ice directly on your skin.  Wrap a cold pack or bag of ice in a thin cloth. Place it over the injured area.  Ice for 10 minutes every 3 hours. Dont ice for more than 20 minutes at a time.  [] Compression  Putting pressure (compression) on an injury helps prevent swelling and provides support.  Wrap the injured area firmly with an elastic bandage. If your hand or foot tingles, becomes discolored, or feels cold to the touch, the bandage may be too tight. Rewrap it more loosely.  If your bandage becomes too loose, rewrap it.  Do not wear an elastic bandage overnight.  [] Elevation  Keeping an injury elevated helps reduce swelling, pain, and throbbing. Elevation is most effective when the injury is kept elevated higher than the heart.     Call your healthcare provider if you notice any of the following:  Fingers or toes feel numb, are cold to the touch, or change color  Skin looks shiny or tight  Pain, swelling, or bruising worsens and is not improved with elevation         All of your core healthy metrics are met.      Gregg Donovan,     If you are due for any health screening(s) below please notify me so we can arrange them to be ordered and scheduled to maintain your health. Most healthy patients complete it. Don't lose out on  improving your health.     All of your core healthy metrics are met.

## 2023-01-19 ENCOUNTER — PES CALL (OUTPATIENT)
Dept: ADMINISTRATIVE | Facility: OTHER | Age: 73
End: 2023-01-19
Payer: MEDICARE

## 2023-01-30 ENCOUNTER — OFFICE VISIT (OUTPATIENT)
Dept: OPTOMETRY | Facility: CLINIC | Age: 73
End: 2023-01-30
Payer: MEDICARE

## 2023-01-30 DIAGNOSIS — H25.13 NUCLEAR SCLEROSIS OF BOTH EYES: Primary | ICD-10-CM

## 2023-01-30 DIAGNOSIS — H52.203 MYOPIA WITH ASTIGMATISM AND PRESBYOPIA, BILATERAL: ICD-10-CM

## 2023-01-30 DIAGNOSIS — H52.4 MYOPIA WITH ASTIGMATISM AND PRESBYOPIA, BILATERAL: ICD-10-CM

## 2023-01-30 DIAGNOSIS — H52.13 MYOPIA WITH ASTIGMATISM AND PRESBYOPIA, BILATERAL: ICD-10-CM

## 2023-01-30 PROCEDURE — 92015 PR REFRACTION: ICD-10-PCS | Mod: HCNC,S$GLB,, | Performed by: OPTOMETRIST

## 2023-01-30 PROCEDURE — 3288F FALL RISK ASSESSMENT DOCD: CPT | Mod: HCNC,CPTII,S$GLB, | Performed by: OPTOMETRIST

## 2023-01-30 PROCEDURE — 1126F AMNT PAIN NOTED NONE PRSNT: CPT | Mod: HCNC,CPTII,S$GLB, | Performed by: OPTOMETRIST

## 2023-01-30 PROCEDURE — 1126F PR PAIN SEVERITY QUANTIFIED, NO PAIN PRESENT: ICD-10-PCS | Mod: HCNC,CPTII,S$GLB, | Performed by: OPTOMETRIST

## 2023-01-30 PROCEDURE — 99999 PR PBB SHADOW E&M-EST. PATIENT-LVL III: CPT | Mod: PBBFAC,HCNC,, | Performed by: OPTOMETRIST

## 2023-01-30 PROCEDURE — 92004 PR EYE EXAM, NEW PATIENT,COMPREHESV: ICD-10-PCS | Mod: HCNC,S$GLB,, | Performed by: OPTOMETRIST

## 2023-01-30 PROCEDURE — 1159F MED LIST DOCD IN RCRD: CPT | Mod: HCNC,CPTII,S$GLB, | Performed by: OPTOMETRIST

## 2023-01-30 PROCEDURE — 92015 DETERMINE REFRACTIVE STATE: CPT | Mod: HCNC,S$GLB,, | Performed by: OPTOMETRIST

## 2023-01-30 PROCEDURE — 1159F PR MEDICATION LIST DOCUMENTED IN MEDICAL RECORD: ICD-10-PCS | Mod: HCNC,CPTII,S$GLB, | Performed by: OPTOMETRIST

## 2023-01-30 PROCEDURE — 92004 COMPRE OPH EXAM NEW PT 1/>: CPT | Mod: HCNC,S$GLB,, | Performed by: OPTOMETRIST

## 2023-01-30 PROCEDURE — 1101F PR PT FALLS ASSESS DOC 0-1 FALLS W/OUT INJ PAST YR: ICD-10-PCS | Mod: HCNC,CPTII,S$GLB, | Performed by: OPTOMETRIST

## 2023-01-30 PROCEDURE — 3288F PR FALLS RISK ASSESSMENT DOCUMENTED: ICD-10-PCS | Mod: HCNC,CPTII,S$GLB, | Performed by: OPTOMETRIST

## 2023-01-30 PROCEDURE — 1101F PT FALLS ASSESS-DOCD LE1/YR: CPT | Mod: HCNC,CPTII,S$GLB, | Performed by: OPTOMETRIST

## 2023-01-30 PROCEDURE — 99999 PR PBB SHADOW E&M-EST. PATIENT-LVL III: ICD-10-PCS | Mod: PBBFAC,HCNC,, | Performed by: OPTOMETRIST

## 2023-01-30 NOTE — PROGRESS NOTES
HPI    Last eye exam was 7/26/21 with Dr. Trejo.  Patient states decrease in overall vision since last exam. Occasionally   sees floaters OU.  Patient denies diplopia, headaches, flashes, and pain.      Last edited by Sue Camargo MA on 1/30/2023  9:06 AM.            Assessment /Plan     For exam results, see Encounter Report.    Nuclear sclerosis of both eyes    Myopia with astigmatism and presbyopia, bilateral            Educated on cataracts and affects on vision.  Early-monitor.  Bifocal rx given.  Retina flat and intact OU--no holes, tears, breaks, or RDs.  Eye health normal OU.

## 2023-02-07 DIAGNOSIS — Z00.00 ENCOUNTER FOR MEDICARE ANNUAL WELLNESS EXAM: ICD-10-CM

## 2023-02-28 DIAGNOSIS — I10 ESSENTIAL HYPERTENSION: ICD-10-CM

## 2023-02-28 RX ORDER — LOSARTAN POTASSIUM 25 MG/1
25 TABLET ORAL DAILY
Qty: 90 TABLET | Refills: 3 | Status: SHIPPED | OUTPATIENT
Start: 2023-02-28 | End: 2023-05-27 | Stop reason: SDUPTHER

## 2023-02-28 NOTE — TELEPHONE ENCOUNTER
Refill Decision Note   Zion Guillen  is requesting a refill authorization.  Brief Assessment and Rationale for Refill:  Approve     Medication Therapy Plan:       Medication Reconciliation Completed: No   Comments:     No Care Gaps recommended.     Note composed:2:30 PM 02/28/2023

## 2023-02-28 NOTE — TELEPHONE ENCOUNTER
No new care gaps identified.  [facility] Embedded Care Gaps. Reference number: [id number]. 2/28/2023   10:27:37 AM CST

## 2023-03-16 ENCOUNTER — OFFICE VISIT (OUTPATIENT)
Dept: INTERNAL MEDICINE | Facility: CLINIC | Age: 73
End: 2023-03-16
Payer: MEDICARE

## 2023-03-16 VITALS
WEIGHT: 156.31 LBS | SYSTOLIC BLOOD PRESSURE: 138 MMHG | HEART RATE: 62 BPM | OXYGEN SATURATION: 98 % | BODY MASS INDEX: 25.23 KG/M2 | DIASTOLIC BLOOD PRESSURE: 78 MMHG

## 2023-03-16 DIAGNOSIS — J34.89 SINUS PRESSURE: ICD-10-CM

## 2023-03-16 DIAGNOSIS — J01.00 SUBACUTE MAXILLARY SINUSITIS: Primary | ICD-10-CM

## 2023-03-16 DIAGNOSIS — J31.0 RHINITIS, UNSPECIFIED TYPE: ICD-10-CM

## 2023-03-16 PROCEDURE — 1159F MED LIST DOCD IN RCRD: CPT | Mod: HCNC,CPTII,S$GLB, | Performed by: PHYSICIAN ASSISTANT

## 2023-03-16 PROCEDURE — 1101F PR PT FALLS ASSESS DOC 0-1 FALLS W/OUT INJ PAST YR: ICD-10-PCS | Mod: HCNC,CPTII,S$GLB, | Performed by: PHYSICIAN ASSISTANT

## 2023-03-16 PROCEDURE — 4010F ACE/ARB THERAPY RXD/TAKEN: CPT | Mod: HCNC,CPTII,S$GLB, | Performed by: PHYSICIAN ASSISTANT

## 2023-03-16 PROCEDURE — 3078F PR MOST RECENT DIASTOLIC BLOOD PRESSURE < 80 MM HG: ICD-10-PCS | Mod: HCNC,CPTII,S$GLB, | Performed by: PHYSICIAN ASSISTANT

## 2023-03-16 PROCEDURE — 3288F PR FALLS RISK ASSESSMENT DOCUMENTED: ICD-10-PCS | Mod: HCNC,CPTII,S$GLB, | Performed by: PHYSICIAN ASSISTANT

## 2023-03-16 PROCEDURE — 99214 PR OFFICE/OUTPT VISIT, EST, LEVL IV, 30-39 MIN: ICD-10-PCS | Mod: HCNC,S$GLB,, | Performed by: PHYSICIAN ASSISTANT

## 2023-03-16 PROCEDURE — 3288F FALL RISK ASSESSMENT DOCD: CPT | Mod: HCNC,CPTII,S$GLB, | Performed by: PHYSICIAN ASSISTANT

## 2023-03-16 PROCEDURE — 1101F PT FALLS ASSESS-DOCD LE1/YR: CPT | Mod: HCNC,CPTII,S$GLB, | Performed by: PHYSICIAN ASSISTANT

## 2023-03-16 PROCEDURE — 3075F SYST BP GE 130 - 139MM HG: CPT | Mod: HCNC,CPTII,S$GLB, | Performed by: PHYSICIAN ASSISTANT

## 2023-03-16 PROCEDURE — 3008F PR BODY MASS INDEX (BMI) DOCUMENTED: ICD-10-PCS | Mod: HCNC,CPTII,S$GLB, | Performed by: PHYSICIAN ASSISTANT

## 2023-03-16 PROCEDURE — 4010F PR ACE/ARB THEARPY RXD/TAKEN: ICD-10-PCS | Mod: HCNC,CPTII,S$GLB, | Performed by: PHYSICIAN ASSISTANT

## 2023-03-16 PROCEDURE — 99999 PR PBB SHADOW E&M-EST. PATIENT-LVL III: CPT | Mod: PBBFAC,HCNC,, | Performed by: PHYSICIAN ASSISTANT

## 2023-03-16 PROCEDURE — 1126F PR PAIN SEVERITY QUANTIFIED, NO PAIN PRESENT: ICD-10-PCS | Mod: HCNC,CPTII,S$GLB, | Performed by: PHYSICIAN ASSISTANT

## 2023-03-16 PROCEDURE — 1126F AMNT PAIN NOTED NONE PRSNT: CPT | Mod: HCNC,CPTII,S$GLB, | Performed by: PHYSICIAN ASSISTANT

## 2023-03-16 PROCEDURE — 1159F PR MEDICATION LIST DOCUMENTED IN MEDICAL RECORD: ICD-10-PCS | Mod: HCNC,CPTII,S$GLB, | Performed by: PHYSICIAN ASSISTANT

## 2023-03-16 PROCEDURE — 3075F PR MOST RECENT SYSTOLIC BLOOD PRESS GE 130-139MM HG: ICD-10-PCS | Mod: HCNC,CPTII,S$GLB, | Performed by: PHYSICIAN ASSISTANT

## 2023-03-16 PROCEDURE — 3078F DIAST BP <80 MM HG: CPT | Mod: HCNC,CPTII,S$GLB, | Performed by: PHYSICIAN ASSISTANT

## 2023-03-16 PROCEDURE — 3008F BODY MASS INDEX DOCD: CPT | Mod: HCNC,CPTII,S$GLB, | Performed by: PHYSICIAN ASSISTANT

## 2023-03-16 PROCEDURE — 99214 OFFICE O/P EST MOD 30 MIN: CPT | Mod: HCNC,S$GLB,, | Performed by: PHYSICIAN ASSISTANT

## 2023-03-16 PROCEDURE — 99999 PR PBB SHADOW E&M-EST. PATIENT-LVL III: ICD-10-PCS | Mod: PBBFAC,HCNC,, | Performed by: PHYSICIAN ASSISTANT

## 2023-03-16 RX ORDER — MONTELUKAST SODIUM 10 MG/1
10 TABLET ORAL NIGHTLY
Qty: 30 TABLET | Refills: 0 | Status: SHIPPED | OUTPATIENT
Start: 2023-03-16 | End: 2023-04-15

## 2023-03-16 NOTE — PROGRESS NOTES
INTERNAL MEDICINE URGENT VISIT NOTE    CHIEF COMPLAINT     Chief Complaint   Patient presents with    Sinus Problem       HPI     Zion Guillen is a 72 y.o. male who presents for an urgent visit today.    PCP is Michelle Song MD, patient is new to me.     Patient presents with complaints of recurrent sinus pressure and pain on the left side. Symptoms have been refractory to OTC medications. He has seen audiology but not ENT.   Prescribed albuterol, tessalon, zyrtec, flonase, mucinex - has been taking these medications regularly - still with severe mucous production that was relieved     No fever, seems to be feeling better over the past three days after pseudaphed  Exam shows only HEENT inflammation. No infection   RTC in 1 week, low treshold to start abx if symptoms worsen despite this change in regimen. He is aware of and amenable to plan.       Past Medical History:  Past Medical History:   Diagnosis Date    Arthritis     BCC (basal cell carcinoma of skin) 7/1/2022    BPH (benign prostatic hyperplasia)     Hyperlipidemia     Hypertension        Home Medications:  Prior to Admission medications    Medication Sig Start Date End Date Taking? Authorizing Provider   albuterol (VENTOLIN HFA) 90 mcg/actuation inhaler Inhale 2 puffs into the lungs every 6 (six) hours as needed for Wheezing. Rescue 1/18/23  Yes Michelle Song MD   amLODIPine (NORVASC) 10 MG tablet Take 1 tablet (10 mg total) by mouth once daily. 10/18/22  Yes Michelle Song MD   atorvastatin (LIPITOR) 40 MG tablet Take 1 tablet (40 mg total) by mouth once daily. 7/18/22  Yes Delmis Lee MD   benzonatate (TESSALON) 200 MG capsule Take 1 capsule (200 mg total) by mouth 3 (three) times daily as needed for Cough. 2/28/23  Yes Michelle Song MD   cetirizine (ZYRTEC) 10 MG tablet Take 10 mg by mouth once daily.   Yes Historical Provider   cholecalciferol, vitamin D3, (VITAMIN D3) 50 mcg (2,000 unit) Cap    Yes Historical Provider   diclofenac sodium  (VOLTAREN) 1 % Gel Apply 2 g topically 4 (four) times daily as needed (pain). 1/18/23  Yes Michelle Song MD   fluticasone propionate (FLONASE) 50 mcg/actuation nasal spray 1 spray (50 mcg total) by Each Nostril route once daily. 3/8/23  Yes Britney Roe MD   losartan (COZAAR) 25 MG tablet Take 1 tablet (25 mg total) by mouth once daily. 2/28/23  Yes Michelle Song MD   diclofenac (VOLTAREN) 75 MG EC tablet Take 1 tablet (75 mg total) by mouth 2 (two) times daily as needed (pain). 5/3/22   Michelle Snog MD   glucosamine-chondroitin 500-400 mg tablet Take 2 tablets by mouth once daily.    Historical Provider   guaiFENesin (MUCINEX) 600 mg 12 hr tablet Take 1 tablet (600 mg total) by mouth 2 (two) times daily.  Patient not taking: Reported on 3/16/2023 12/16/22   Michelle Song MD       Review of Systems:  Review of Systems   Constitutional:  Negative for chills and fever.   HENT:  Positive for congestion, sinus pressure and sinus pain. Negative for sore throat and trouble swallowing.    Eyes:  Negative for visual disturbance.   Respiratory:  Negative for cough and shortness of breath.    Cardiovascular:  Negative for chest pain.   Gastrointestinal:  Negative for abdominal pain, constipation, diarrhea, nausea and vomiting.   Genitourinary:  Negative for dysuria and flank pain.   Musculoskeletal:  Negative for back pain, neck pain and neck stiffness.   Skin:  Negative for rash.   Neurological:  Negative for dizziness, syncope, weakness and headaches.   Psychiatric/Behavioral:  Negative for confusion.      Health Maintainence:   Immunizations:  Health Maintenance         Date Due Completion Date    COVID-19 Vaccine (5 - Booster for Pfizer series) 11/23/2022 9/28/2022    Colorectal Cancer Screening 08/16/2023 8/16/2022    TETANUS VACCINE 01/01/2024 1/1/2014 (Done)    Override on 1/1/2014: Done    High Dose Statin 01/30/2024 1/30/2023    Lipid Panel 12/21/2027 12/21/2022    Override on 4/1/2019: Done (normal, done with  prior PCP at [facility] in [place])             PHYSICAL EXAM     /78 (BP Location: Left arm, Patient Position: Sitting, BP Method: Medium (Manual))   Pulse 62   Wt 70.9 kg (156 lb 4.9 oz)   SpO2 98%   BMI 25.23 kg/m²     Physical Exam  Vitals and nursing note reviewed.   Constitutional:       Appearance: Normal appearance.      Comments: Elderly male in NAD or apparent pain. He makes good eye contact, speaks in clear full sentences and ambulates with ease.    HENT:      Head: Normocephalic and atraumatic.      Nose: Nose normal.      Mouth/Throat:      Pharynx: Oropharynx is clear.   Eyes:      Conjunctiva/sclera: Conjunctivae normal.   Cardiovascular:      Rate and Rhythm: Normal rate and regular rhythm.      Pulses: Normal pulses.   Pulmonary:      Effort: No respiratory distress.   Abdominal:      Tenderness: There is no abdominal tenderness.   Musculoskeletal:         General: Normal range of motion.      Cervical back: No rigidity.   Skin:     General: Skin is warm and dry.      Capillary Refill: Capillary refill takes less than 2 seconds.      Findings: No rash.   Neurological:      General: No focal deficit present.      Mental Status: He is alert.      Gait: Gait normal.   Psychiatric:         Mood and Affect: Mood normal.       LABS     Lab Results   Component Value Date    HGBA1C 5.4 12/21/2022     CMP  Sodium   Date Value Ref Range Status   12/21/2022 146 (H) 136 - 145 mmol/L Final     Potassium   Date Value Ref Range Status   12/21/2022 3.9 3.5 - 5.1 mmol/L Final     Chloride   Date Value Ref Range Status   12/21/2022 109 95 - 110 mmol/L Final     CO2   Date Value Ref Range Status   12/21/2022 29 23 - 29 mmol/L Final     Glucose   Date Value Ref Range Status   12/21/2022 87 70 - 110 mg/dL Final     BUN   Date Value Ref Range Status   12/21/2022 15 8 - 23 mg/dL Final     Creatinine   Date Value Ref Range Status   12/21/2022 0.9 0.5 - 1.4 mg/dL Final     Calcium   Date Value Ref Range Status    12/21/2022 9.3 8.7 - 10.5 mg/dL Final     Total Protein   Date Value Ref Range Status   12/21/2022 6.7 6.0 - 8.4 g/dL Final     Albumin   Date Value Ref Range Status   12/21/2022 3.9 3.5 - 5.2 g/dL Final     Total Bilirubin   Date Value Ref Range Status   12/21/2022 0.7 0.1 - 1.0 mg/dL Final     Comment:     For infants and newborns, interpretation of results should be based  on gestational age, weight and in agreement with clinical  observations.    Premature Infant recommended reference ranges:  Up to 24 hours.............<8.0 mg/dL  Up to 48 hours............<12.0 mg/dL  3-5 days..................<15.0 mg/dL  6-29 days.................<15.0 mg/dL       Alkaline Phosphatase   Date Value Ref Range Status   12/21/2022 54 (L) 55 - 135 U/L Final     AST   Date Value Ref Range Status   12/21/2022 38 10 - 40 U/L Final     ALT   Date Value Ref Range Status   12/21/2022 41 10 - 44 U/L Final     Anion Gap   Date Value Ref Range Status   12/21/2022 8 8 - 16 mmol/L Final     eGFR if    Date Value Ref Range Status   01/24/2022 >60 >60 mL/min/1.73 m^2 Final     eGFR if non    Date Value Ref Range Status   01/24/2022 >60 >60 mL/min/1.73 m^2 Final     Comment:     Calculation used to obtain the estimated glomerular filtration  rate (eGFR) is the CKD-EPI equation.        Lab Results   Component Value Date    WBC 6.78 12/21/2022    HGB 14.9 12/21/2022    HCT 43.6 12/21/2022    MCV 96 12/21/2022     12/21/2022     Lab Results   Component Value Date    CHOL 141 12/21/2022    CHOL 173 01/24/2022    CHOL 155 12/15/2020     Lab Results   Component Value Date    HDL 61 12/21/2022    HDL 51 01/24/2022    HDL 49 12/15/2020     Lab Results   Component Value Date    LDLCALC 64.0 12/21/2022    LDLCALC 97.6 01/24/2022    LDLCALC 85.4 12/15/2020     Lab Results   Component Value Date    TRIG 80 12/21/2022    TRIG 122 01/24/2022    TRIG 103 12/15/2020     Lab Results   Component Value Date    CHOLHDL  43.3 12/21/2022    CHOLHDL 29.5 01/24/2022    CHOLHDL 31.6 12/15/2020     No results found for: TSH, T2CTBTT, N6VKNGJ, THYROIDAB    ASSESSMENT/PLAN     Zion Guillen is a 72 y.o. male     Zion was seen today for sinus problem. Will add singular and recommend home pollen mitigation and nighttime  nasal saline flushing. Low threshold to start abx if symptoms worsen.     Diagnoses and all orders for this visit:    Subacute maxillary sinusitis    Rhinitis, unspecified type    Sinus pressure    Other orders  -     montelukast (SINGULAIR) 10 mg tablet; Take 1 tablet (10 mg total) by mouth every evening.     Patient was counseled on when and how to seek emergent care.       Cristina Conde PA-C   Department of Internal Medicine - [facility]   10:48 AM

## 2023-04-04 ENCOUNTER — DOCUMENTATION ONLY (OUTPATIENT)
Dept: AUDIOLOGY | Facility: CLINIC | Age: 73
End: 2023-04-04
Payer: MEDICARE

## 2023-04-04 NOTE — PROGRESS NOTES
Patient is sending in his hearing aids to [employer] for an end of warranty check.  I'll give patient a call once they come back for repair.    [employer] M90-RT  Purchase Date: 3/03/2020  Color: Silver gray  Battery: Lithium-ion  Tube/ length & power: #2 M  Dome size & style: [employer] small open   R SN: [id number]  L SN:[id number]  Warranty and L/D Exp: 5/31/2023

## 2023-04-09 ENCOUNTER — HOSPITAL ENCOUNTER (EMERGENCY)
Facility: OTHER | Age: 73
Discharge: HOME | End: 2023-04-09
Attending: EMERGENCY MEDICINE
Payer: MEDICARE

## 2023-04-09 ENCOUNTER — NURSE TRIAGE (OUTPATIENT)
Dept: ADMINISTRATIVE | Facility: CLINIC | Age: 73
End: 2023-04-09
Payer: MEDICARE

## 2023-04-09 VITALS
DIASTOLIC BLOOD PRESSURE: 76 MMHG | SYSTOLIC BLOOD PRESSURE: 143 MMHG | RESPIRATION RATE: 18 BRPM | BODY MASS INDEX: 25.71 KG/M2 | TEMPERATURE: 99 F | OXYGEN SATURATION: 96 % | WEIGHT: 160 LBS | HEART RATE: 63 BPM | HEIGHT: 66 IN

## 2023-04-09 DIAGNOSIS — U07.1 COVID-19 VIRUS INFECTION: ICD-10-CM

## 2023-04-09 LAB
CTP QC/QA: YES
SARS-COV-2 RDRP RESP QL NAA+PROBE: POSITIVE

## 2023-04-09 PROCEDURE — 99283 EMERGENCY DEPT VISIT LOW MDM: CPT | Mod: 25,HCNC

## 2023-04-09 PROCEDURE — 25000003 PHARM REV CODE 250: Mod: HCNC | Performed by: EMERGENCY MEDICINE

## 2023-04-09 RX ORDER — CETIRIZINE HYDROCHLORIDE 5 MG/1
10 TABLET ORAL
Status: COMPLETED | OUTPATIENT
Start: 2023-04-09 | End: 2023-04-09

## 2023-04-09 RX ORDER — OXYMETAZOLINE HCL 0.05 %
1 SPRAY, NON-AEROSOL (ML) NASAL
Status: COMPLETED | OUTPATIENT
Start: 2023-04-09 | End: 2023-04-09

## 2023-04-09 RX ADMIN — Medication 1 SPRAY: at 09:04

## 2023-04-09 RX ADMIN — GUAIFENESIN, DEXTROMETHORPHAN HBR 1 TABLET: 600; 30 TABLET ORAL at 09:04

## 2023-04-09 RX ADMIN — CETIRIZINE HYDROCHLORIDE 10 MG: 5 TABLET, FILM COATED ORAL at 09:04

## 2023-04-09 NOTE — ED PROVIDER NOTES
Encounter Date: 2023       History     Chief Complaint   Patient presents with    COVID-19 Concerns     Sore throat, cough, fatigue, generalized weakness x 2 days; pt denies any SOB; took at home covid test this AM which was positve     73-year-old male with history of hypertension presents for evaluation of URI symptoms and positive COVID test today.  Patient started feeling nasal congestion with productive cough 1.5 days ago, and started having difficulty sleeping.  Symptoms persisted yesterday with associated mild sore throat, but he denies any fevers or difficulty breathing.  He took some OTC decongestants with some improvement.  He denies any associated wheezing, chest pain, headache, body aches, abdominal pain, nausea/vomiting, or diarrhea.  He is fully vaccinated including booster.  No known sick contacts prior.  No other new complaints    Review of patient's allergies indicates:  No Known Allergies  Past Medical History:   Diagnosis Date    Arthritis     BCC (basal cell carcinoma of skin) 2022    BPH (benign prostatic hyperplasia)     Hyperlipidemia     Hypertension      Past Surgical History:   Procedure Laterality Date    COLONOSCOPY  2006    CYSTOURETHROSCOPY  05/15/2019    HERNIA REPAIR  2017    umbilical, bilateral inguinal    HERNIA REPAIR      KNEE ARTHROSCOPY Left     left knee meniscus repair    PROSTATE SURGERY  10/25/2018    TRANSURETHRAL RESECTION OF PROSTATE  10/25/2018     Family History   Problem Relation Age of Onset    Uterine cancer Mother          at 78    Arthritis Mother     Cancer Mother     Coronary artery disease Father     Heart attack Father          at 72    Hypertension Father     Cataracts Neg Hx     Glaucoma Neg Hx     Macular degeneration Neg Hx      Social History     Tobacco Use    Smoking status: Former     Types: Cigarettes    Smokeless tobacco: Never   Substance Use Topics    Alcohol use: Yes     Alcohol/week: 9.0 standard drinks     Types: 6  Glasses of wine, 3 Shots of liquor per week     Comment: socially    Drug use: No     Review of Systems   Constitutional:  Negative for fever.   HENT:  Positive for congestion and sore throat.    Eyes:  Negative for redness.   Respiratory:  Positive for cough. Negative for shortness of breath.    Cardiovascular:  Negative for chest pain.   Gastrointestinal:  Negative for abdominal pain.   Genitourinary:  Negative for dysuria.   Skin:  Negative for rash.   Neurological:  Negative for headaches.   Psychiatric/Behavioral:  Negative for confusion.      Physical Exam     Initial Vitals [04/09/23 0841]   BP Pulse Resp Temp SpO2   132/74 82 20 99.1 °F (37.3 °C) 96 %      MAP       --         Physical Exam    Nursing note and vitals reviewed.  Constitutional: He appears well-developed and well-nourished. He is not diaphoretic. No distress.   HENT:   Head: Normocephalic and atraumatic.   Mild nasal congestion, no sinus tenderness.  Mild posterior pharynx erythema with no exudate   Eyes: Conjunctivae are normal. No scleral icterus.   Neck: Neck supple.   No meningeal signs   Normal range of motion.  Cardiovascular:  Normal rate, regular rhythm, normal heart sounds and intact distal pulses.           No murmur heard.  Pulmonary/Chest: Breath sounds normal. No respiratory distress. He has no wheezes. He has no rhonchi. He has no rales.   Abdominal: Abdomen is soft. There is no abdominal tenderness. There is no rebound and no guarding.   Musculoskeletal:         General: No edema.      Cervical back: Normal range of motion and neck supple.     Neurological: He is alert and oriented to person, place, and time.   Skin: Skin is warm and dry.   Psychiatric: He has a normal mood and affect.       ED Course   Procedures  Labs Reviewed   SARS-COV-2 RDRP GENE          Imaging Results    None          Medications   cetirizine tablet 10 mg (has no administration in time range)   oxymetazoline 0.05 % nasal spray 1 spray (has no  administration in time range)   dextromethorphan-guaiFENesin  mg per 12 hr tablet 1 tablet (has no administration in time range)     Medical Decision Making:   Initial Assessment:       73-year-old male with history of hypertension presents for evaluation of URI symptoms and positive COVID test today.  He had onset of nasal congestion and productive cough 1.5 days ago, with sore throat as well.  Nurse triage note mentioning associated chest pain reviewed but patient to me denies any other associated symptoms such as fevers, SOB, chest pain, abdominal pain, vomiting, diarrhea, fatigue, headache or myalgias.  No known sick contacts, he is fully vaccinated against COVID-19.  He took a home positive COVID test earlier today.   On arrival patient afebrile and well-appearing, with normal O2 sat on room air.  He is normal lung exam with no active wheezing or bibasilar rales suggestive of COVID pneumonia, and no sinus tenderness or sign of strep pharyngitis.  No abdominal tenderness or other concerning exam findings.      Chest x-ray checked with no sign of pneumonia or other abnormalities per my independent interpretation.  He was treated with Zyrtec, Afrin nasal spray, and cough suppressant in the ED with improvement.  He is advised to continue these medications but Afrin for less than 3 days due to concern for rebound congestion.  He also has an albuterol inhaler recently prescribed by his PCP for his allergic rhinitis and chronic cough, can use this as well.  I discussed with patient's risks and benefits of Paxlovid since he meets criteria for taking it, though he has mild symptoms with low chance of progression to severe disease.  Will give Rx for it and patient will discuss with his PCP and decide if to take it.  He is comfortable with this discharge plan and understands return precautions for any worsening symptoms or other concerns.                                Clinical Impression:   Final  diagnoses:  [U07.1] COVID-19 virus infection               Olman Rivera MD  04/09/23 8035

## 2023-04-09 NOTE — ED TRIAGE NOTES
Pt presents to ED c/o cough, sore throat headache, fatigue onset yesterday. Pt reports + home covid test this am and would like to know if there is any treatments. Reports taking sutafed and ibuprofen at home with some relief. Denies fever, SOB, chest pain.

## 2023-04-09 NOTE — DISCHARGE INSTRUCTIONS
Stop taking Atorvastatin if you start taking Paxlovid, can resume it after 5 day treatment is completed

## 2023-04-09 NOTE — TELEPHONE ENCOUNTER
[facility] incoming call - Pt's spouse and Pt, c/o Pt tested positive for covid this morning, should he go to an uc/er asked, symptoms cough sore throat, chest pain less than severe, moderate nichole. Covid protocol followed and pt advised to go to the ER now for further assessment and tx and if he cannot make it there for any reason to call 911 now instead, don't drive yourself, wear a mask for you are considered contagious. Encounter routed to PCP/staff as high priority. Spouse agrees with dispo.   Reason for Disposition   MODERATE difficulty breathing (e.g., speaks in phrases, SOB even at rest, pulse 100-120)    Additional Information   Negative: SEVERE difficulty breathing (e.g., struggling for each breath, speaks in single words)   Negative: Difficult to awaken or acting confused (e.g., disoriented, slurred speech)   Negative: Bluish (or gray) lips or face now   Negative: Shock suspected (e.g., cold/pale/clammy skin, too weak to stand, low BP, rapid pulse)   Negative: Sounds like a life-threatening emergency to the triager   Negative: SEVERE or constant chest pain or pressure  (Exception: Mild central chest pain, present only when coughing.)    Protocols used: Coronavirus (COVID-19) Diagnosed or [id number]

## 2023-04-14 ENCOUNTER — DOCUMENTATION ONLY (OUTPATIENT)
Dept: AUDIOLOGY | Facility: CLINIC | Age: 73
End: 2023-04-14
Payer: MEDICARE

## 2023-04-14 ENCOUNTER — TELEPHONE (OUTPATIENT)
Dept: AUDIOLOGY | Facility: CLINIC | Age: 73
End: 2023-04-14
Payer: MEDICARE

## 2023-04-14 NOTE — PROGRESS NOTES
Received in warranty repair from [employer] for end of warranty check.    Action Taken:  Replaced electronics, housing and external       [employer] M90-RT  Purchase Date: 3/03/2020  Color: Silver gray  Battery: Lithium-ion  Tube/ length & power: #2 M  Dome size & style: [employer] small open   R SN: [id number]  L SN:[id number]  Warranty and L/D Exp: 5/31/2023

## 2023-04-17 ENCOUNTER — DOCUMENTATION ONLY (OUTPATIENT)
Dept: AUDIOLOGY | Facility: CLINIC | Age: 73
End: 2023-04-17
Payer: MEDICARE

## 2023-04-28 ENCOUNTER — PES CALL (OUTPATIENT)
Dept: ADMINISTRATIVE | Facility: CLINIC | Age: 73
End: 2023-04-28
Payer: MEDICARE

## 2023-04-28 DIAGNOSIS — J00 ACUTE NASOPHARYNGITIS: ICD-10-CM

## 2023-04-28 DIAGNOSIS — J30.9 ALLERGIC RHINITIS, UNSPECIFIED SEASONALITY, UNSPECIFIED TRIGGER: ICD-10-CM

## 2023-04-28 RX ORDER — FLUTICASONE PROPIONATE 50 MCG
1 SPRAY, SUSPENSION (ML) NASAL DAILY
Qty: 16 G | Refills: 11 | Status: SHIPPED | OUTPATIENT
Start: 2023-04-28 | End: 2023-08-28 | Stop reason: ALTCHOICE

## 2023-04-28 NOTE — TELEPHONE ENCOUNTER
No care due was identified.  [facility] Embedded Care Due Messages. Reference number: [id number].   4/28/2023 9:05:58 AM CDT

## 2023-05-22 ENCOUNTER — PES CALL (OUTPATIENT)
Dept: ADMINISTRATIVE | Facility: CLINIC | Age: 73
End: 2023-05-22
Payer: MEDICARE

## 2023-05-27 DIAGNOSIS — I10 ESSENTIAL HYPERTENSION: ICD-10-CM

## 2023-05-27 NOTE — TELEPHONE ENCOUNTER
No care due was identified.  [facility] Embedded Care Due Messages. Reference number: [id number].   5/27/2023 12:44:26 PM CDT

## 2023-05-29 RX ORDER — LOSARTAN POTASSIUM 25 MG/1
25 TABLET ORAL DAILY
Qty: 90 TABLET | Refills: 3 | Status: SHIPPED | OUTPATIENT
Start: 2023-05-29 | End: 2023-12-03 | Stop reason: SDUPTHER

## 2023-05-29 NOTE — TELEPHONE ENCOUNTER
Refill Routing Note   Refill Routing Note   Medication(s) are not appropriate for processing by [facility] for the following reason(s):      Patient seen in ED/Hospital since LOV with PCP  Required vitals abnormal    [facility] action(s):  Defer None identified     Medication Therapy Plan: BP 4/09/23 (!) 143/76  Medication reconciliation completed: No     Appointments  past 12m or future 3m with PCP    Date Provider   Last Visit   1/17/2023 Michelle Song MD   Next Visit   Visit date not found Michelle Song MD   ED visits in past 90 days: 1        Note composed:10:01 AM 05/29/2023

## 2023-05-31 ENCOUNTER — PATIENT MESSAGE (OUTPATIENT)
Dept: INTERNAL MEDICINE | Facility: CLINIC | Age: 73
End: 2023-05-31
Payer: MEDICARE

## 2023-06-08 DIAGNOSIS — M17.0 BILATERAL PRIMARY OSTEOARTHRITIS OF KNEE: ICD-10-CM

## 2023-06-08 NOTE — TELEPHONE ENCOUNTER
Refill Routing Note   Medication(s) are not appropriate for processing by [facility] for the following reason(s):      Medication outside of protocol    [facility] action(s):  Route Care Due:  None identified          Appointments  past 12m or future 3m with PCP    Date Provider   Last Visit   1/17/2023 Michelle Song MD   Next Visit   Visit date not found Michelle Song MD   ED visits in past 90 days: 1        Note composed:6:13 PM 06/08/2023

## 2023-06-08 NOTE — TELEPHONE ENCOUNTER
Refill Routing Note   Medication(s) are not appropriate for processing by [facility] for the following reason(s):      Medication outside of protocol    [facility] action(s):  Route Care Due:  None identified          Appointments  past 12m or future 3m with PCP    Date Provider   Last Visit   1/17/2023 Michelle Song MD   Next Visit   Visit date not found Michelle Song MD   ED visits in past 90 days: 1        Note composed:3:43 PM 06/08/2023

## 2023-06-09 RX ORDER — DICLOFENAC SODIUM 10 MG/G
2 GEL TOPICAL 4 TIMES DAILY PRN
Qty: 100 G | Refills: 11 | OUTPATIENT
Start: 2023-06-09

## 2023-06-10 ENCOUNTER — PATIENT MESSAGE (OUTPATIENT)
Dept: INTERNAL MEDICINE | Facility: CLINIC | Age: 73
End: 2023-06-10
Payer: MEDICARE

## 2023-06-12 ENCOUNTER — PATIENT MESSAGE (OUTPATIENT)
Dept: INTERNAL MEDICINE | Facility: CLINIC | Age: 73
End: 2023-06-12
Payer: MEDICARE

## 2023-06-12 DIAGNOSIS — M17.0 BILATERAL PRIMARY OSTEOARTHRITIS OF KNEE: ICD-10-CM

## 2023-06-12 RX ORDER — DICLOFENAC SODIUM 10 MG/G
2 GEL TOPICAL 4 TIMES DAILY PRN
Qty: 100 G | Refills: 11 | Status: SHIPPED | OUTPATIENT
Start: 2023-06-12 | End: 2023-08-25 | Stop reason: SDUPTHER

## 2023-06-13 LAB — HEMOCCULT STL QL IA: NEGATIVE

## 2023-07-07 ENCOUNTER — OFFICE VISIT (OUTPATIENT)
Dept: INTERNAL MEDICINE | Facility: CLINIC | Age: 73
End: 2023-07-07
Payer: MEDICARE

## 2023-07-07 VITALS
OXYGEN SATURATION: 96 % | WEIGHT: 154.13 LBS | DIASTOLIC BLOOD PRESSURE: 76 MMHG | BODY MASS INDEX: 24.77 KG/M2 | HEART RATE: 70 BPM | HEIGHT: 66 IN | SYSTOLIC BLOOD PRESSURE: 128 MMHG

## 2023-07-07 DIAGNOSIS — J30.9 CHRONIC ALLERGIC RHINITIS: ICD-10-CM

## 2023-07-07 DIAGNOSIS — I10 BENIGN HYPERTENSION: Primary | ICD-10-CM

## 2023-07-07 DIAGNOSIS — J01.00 ACUTE MAXILLARY SINUSITIS, RECURRENCE NOT SPECIFIED: ICD-10-CM

## 2023-07-07 PROCEDURE — 1125F PR PAIN SEVERITY QUANTIFIED, PAIN PRESENT: ICD-10-PCS | Mod: HCNC,CPTII,S$GLB, | Performed by: PHYSICIAN ASSISTANT

## 2023-07-07 PROCEDURE — 1101F PR PT FALLS ASSESS DOC 0-1 FALLS W/OUT INJ PAST YR: ICD-10-PCS | Mod: HCNC,CPTII,S$GLB, | Performed by: PHYSICIAN ASSISTANT

## 2023-07-07 PROCEDURE — 1101F PT FALLS ASSESS-DOCD LE1/YR: CPT | Mod: HCNC,CPTII,S$GLB, | Performed by: PHYSICIAN ASSISTANT

## 2023-07-07 PROCEDURE — 4010F PR ACE/ARB THEARPY RXD/TAKEN: ICD-10-PCS | Mod: HCNC,CPTII,S$GLB, | Performed by: PHYSICIAN ASSISTANT

## 2023-07-07 PROCEDURE — 99999 PR PBB SHADOW E&M-EST. PATIENT-LVL III: CPT | Mod: PBBFAC,HCNC,, | Performed by: PHYSICIAN ASSISTANT

## 2023-07-07 PROCEDURE — 3078F PR MOST RECENT DIASTOLIC BLOOD PRESSURE < 80 MM HG: ICD-10-PCS | Mod: HCNC,CPTII,S$GLB, | Performed by: PHYSICIAN ASSISTANT

## 2023-07-07 PROCEDURE — 3008F BODY MASS INDEX DOCD: CPT | Mod: HCNC,CPTII,S$GLB, | Performed by: PHYSICIAN ASSISTANT

## 2023-07-07 PROCEDURE — 3074F PR MOST RECENT SYSTOLIC BLOOD PRESSURE < 130 MM HG: ICD-10-PCS | Mod: HCNC,CPTII,S$GLB, | Performed by: PHYSICIAN ASSISTANT

## 2023-07-07 PROCEDURE — 99999 PR PBB SHADOW E&M-EST. PATIENT-LVL III: ICD-10-PCS | Mod: PBBFAC,HCNC,, | Performed by: PHYSICIAN ASSISTANT

## 2023-07-07 PROCEDURE — 99214 OFFICE O/P EST MOD 30 MIN: CPT | Mod: HCNC,S$GLB,, | Performed by: PHYSICIAN ASSISTANT

## 2023-07-07 PROCEDURE — 3078F DIAST BP <80 MM HG: CPT | Mod: HCNC,CPTII,S$GLB, | Performed by: PHYSICIAN ASSISTANT

## 2023-07-07 PROCEDURE — 1125F AMNT PAIN NOTED PAIN PRSNT: CPT | Mod: HCNC,CPTII,S$GLB, | Performed by: PHYSICIAN ASSISTANT

## 2023-07-07 PROCEDURE — 3008F PR BODY MASS INDEX (BMI) DOCUMENTED: ICD-10-PCS | Mod: HCNC,CPTII,S$GLB, | Performed by: PHYSICIAN ASSISTANT

## 2023-07-07 PROCEDURE — 3288F FALL RISK ASSESSMENT DOCD: CPT | Mod: HCNC,CPTII,S$GLB, | Performed by: PHYSICIAN ASSISTANT

## 2023-07-07 PROCEDURE — 4010F ACE/ARB THERAPY RXD/TAKEN: CPT | Mod: HCNC,CPTII,S$GLB, | Performed by: PHYSICIAN ASSISTANT

## 2023-07-07 PROCEDURE — 3074F SYST BP LT 130 MM HG: CPT | Mod: HCNC,CPTII,S$GLB, | Performed by: PHYSICIAN ASSISTANT

## 2023-07-07 PROCEDURE — 3288F PR FALLS RISK ASSESSMENT DOCUMENTED: ICD-10-PCS | Mod: HCNC,CPTII,S$GLB, | Performed by: PHYSICIAN ASSISTANT

## 2023-07-07 PROCEDURE — 99214 PR OFFICE/OUTPT VISIT, EST, LEVL IV, 30-39 MIN: ICD-10-PCS | Mod: HCNC,S$GLB,, | Performed by: PHYSICIAN ASSISTANT

## 2023-07-07 RX ORDER — PREDNISONE 20 MG/1
20 TABLET ORAL EVERY MORNING
Qty: 3 TABLET | Refills: 0 | Status: SHIPPED | OUTPATIENT
Start: 2023-07-07 | End: 2023-07-10

## 2023-07-07 RX ORDER — DOXYCYCLINE 100 MG/1
100 CAPSULE ORAL 2 TIMES DAILY
Qty: 14 CAPSULE | Refills: 0 | Status: SHIPPED | OUTPATIENT
Start: 2023-07-07 | End: 2023-07-14

## 2023-07-07 NOTE — PROGRESS NOTES
INTERNAL MEDICINE URGENT VISIT NOTE    CHIEF COMPLAINT     Chief Complaint   Patient presents with    Headache    Nasal Congestion       HPI     Zion Guillen is a 73 y.o. male who presents for an urgent visit today.    PCP is Michelle Song MD, patient is known to me.     Patient presents with complaints of headache and nasal congestion that has been present for the past two weeks. He reports that he has tried nasal saline, Flonase, mucinex, NSAIDs - no relief. He reports that symptoms return and are relentless. He denies any fever. He has no confusion, neck pain, vision changes, nausea, vomiting.   He swims three times a week - wears ear plugs but not nose plugs.   He has never seen ENT  He has never taken abx for these symptoms.     Of note:   Pt reports that he had a fit kit done on 6/14/2023 and it was negative for abnormalities.     Past Medical History:  Past Medical History:   Diagnosis Date    Arthritis     BCC (basal cell carcinoma of skin) 7/1/2022    BPH (benign prostatic hyperplasia)     Hyperlipidemia     Hypertension        Home Medications:  Prior to Admission medications    Medication Sig Start Date End Date Taking? Authorizing Provider   amLODIPine (NORVASC) 10 MG tablet Take 1 tablet (10 mg total) by mouth once daily. 4/18/23  Yes Tutu Garcia,    atorvastatin (LIPITOR) 40 MG tablet Take 1 tablet (40 mg total) by mouth once daily. 7/18/22  Yes Delmis Lee MD   cetirizine (ZYRTEC) 10 MG tablet Take 10 mg by mouth once daily.   Yes Historical Provider   cholecalciferol, vitamin D3, (VITAMIN D3) 50 mcg (2,000 unit) Cap    Yes Historical Provider   diclofenac sodium (VOLTAREN) 1 % Gel Apply 2 g topically 4 (four) times daily as needed (pain). 6/12/23  Yes Shoshana Richards MD   fluticasone propionate (FLONASE) 50 mcg/actuation nasal spray 1 spray (50 mcg total) by Each Nostril route once daily. 4/28/23  Yes Darrian Kelley MD   losartan (COZAAR) 25 MG tablet Take 1 tablet (25 mg total)  by mouth once daily. 5/29/23  Yes Tutu Garcia,    albuterol (VENTOLIN HFA) 90 mcg/actuation inhaler Inhale 2 puffs into the lungs every 6 (six) hours as needed for Wheezing. Rescue 3/28/23   Michelle Song MD   benzonatate (TESSALON) 200 MG capsule Take 1 capsule (200 mg total) by mouth 3 (three) times daily as needed for Cough. 3/29/23   Michelle Song MD   glucosamine-chondroitin 500-400 mg tablet Take 2 tablets by mouth once daily.    Historical Provider   guaiFENesin (MUCINEX) 600 mg 12 hr tablet Take 1 tablet (600 mg total) by mouth 2 (two) times daily.  Patient not taking: Reported on 3/16/2023 12/16/22   Michelle Song MD   diclofenac (VOLTAREN) 75 MG EC tablet Take 1 tablet (75 mg total) by mouth 2 (two) times daily as needed (pain). 5/3/22 7/7/23  Michelle Song MD       Review of Systems:  Review of Systems   Constitutional:  Negative for chills and fever.   HENT:  Positive for congestion, postnasal drip, rhinorrhea, sinus pressure and sinus pain. Negative for sore throat and trouble swallowing.    Eyes:  Negative for visual disturbance.   Respiratory:  Negative for cough and shortness of breath.    Cardiovascular:  Negative for chest pain.   Gastrointestinal:  Negative for abdominal pain, constipation, diarrhea, nausea and vomiting.   Genitourinary:  Negative for dysuria and flank pain.   Musculoskeletal:  Negative for back pain, neck pain and neck stiffness.   Skin:  Negative for rash.   Neurological:  Positive for headaches. Negative for dizziness, syncope and weakness.   Psychiatric/Behavioral:  Negative for confusion.      Health Maintainence:   Immunizations:  Health Maintenance         Date Due Completion Date    COVID-19 Vaccine (5 - Pfizer series) 11/23/2022 9/28/2022    Colorectal Cancer Screening 08/16/2023 8/16/2022    Influenza Vaccine (1) 09/01/2023 9/28/2022    Override on 10/1/2020: Done    TETANUS VACCINE 01/01/2024 1/1/2014 (Done)    Override on 1/1/2014: Done    High Dose Statin  "03/16/2024 3/16/2023    Lipid Panel 12/21/2027 12/21/2022    Override on 4/1/2019: Done (normal, done with prior PCP at [facility] in [place])             PHYSICAL EXAM     /76 (BP Location: Left arm, Patient Position: Sitting)   Pulse 70   Ht 5' 6" (1.676 m)   Wt 69.9 kg (154 lb 1.6 oz)   SpO2 96%   BMI 24.87 kg/m²     Physical Exam  Vitals and nursing note reviewed.   Constitutional:       Appearance: Normal appearance.      Comments: Elderly male in NAD or apparent pain. He sounds nasally congested when speaking. He makes good eye contact, speaks in clear full sentences and ambulates with ease.    HENT:      Head: Normocephalic and atraumatic.      Nose: Nose normal.      Mouth/Throat:      Pharynx: Oropharynx is clear.   Eyes:      Conjunctiva/sclera: Conjunctivae normal.   Neck:      Comments: No lymphadenopathy     Cardiovascular:      Rate and Rhythm: Normal rate and regular rhythm.      Pulses: Normal pulses.   Pulmonary:      Effort: No respiratory distress.   Abdominal:      Tenderness: There is no abdominal tenderness.   Musculoskeletal:         General: Normal range of motion.      Cervical back: No rigidity.   Skin:     General: Skin is warm and dry.      Capillary Refill: Capillary refill takes less than 2 seconds.      Findings: No rash.   Neurological:      General: No focal deficit present.      Mental Status: He is alert.      Gait: Gait normal.   Psychiatric:         Mood and Affect: Mood normal.       LABS     Lab Results   Component Value Date    HGBA1C 5.4 12/21/2022     CMP  Sodium   Date Value Ref Range Status   12/21/2022 146 (H) 136 - 145 mmol/L Final     Potassium   Date Value Ref Range Status   12/21/2022 3.9 3.5 - 5.1 mmol/L Final     Chloride   Date Value Ref Range Status   12/21/2022 109 95 - 110 mmol/L Final     CO2   Date Value Ref Range Status   12/21/2022 29 23 - 29 mmol/L Final     Glucose   Date Value Ref Range Status   12/21/2022 87 70 - 110 mg/dL Final     BUN   Date " Value Ref Range Status   12/21/2022 15 8 - 23 mg/dL Final     Creatinine   Date Value Ref Range Status   12/21/2022 0.9 0.5 - 1.4 mg/dL Final     Calcium   Date Value Ref Range Status   12/21/2022 9.3 8.7 - 10.5 mg/dL Final     Total Protein   Date Value Ref Range Status   12/21/2022 6.7 6.0 - 8.4 g/dL Final     Albumin   Date Value Ref Range Status   12/21/2022 3.9 3.5 - 5.2 g/dL Final     Total Bilirubin   Date Value Ref Range Status   12/21/2022 0.7 0.1 - 1.0 mg/dL Final     Comment:     For infants and newborns, interpretation of results should be based  on gestational age, weight and in agreement with clinical  observations.    Premature Infant recommended reference ranges:  Up to 24 hours.............<8.0 mg/dL  Up to 48 hours............<12.0 mg/dL  3-5 days..................<15.0 mg/dL  6-29 days.................<15.0 mg/dL       Alkaline Phosphatase   Date Value Ref Range Status   12/21/2022 54 (L) 55 - 135 U/L Final     AST   Date Value Ref Range Status   12/21/2022 38 10 - 40 U/L Final     ALT   Date Value Ref Range Status   12/21/2022 41 10 - 44 U/L Final     Anion Gap   Date Value Ref Range Status   12/21/2022 8 8 - 16 mmol/L Final     eGFR if    Date Value Ref Range Status   01/24/2022 >60 >60 mL/min/1.73 m^2 Final     eGFR if non    Date Value Ref Range Status   01/24/2022 >60 >60 mL/min/1.73 m^2 Final     Comment:     Calculation used to obtain the estimated glomerular filtration  rate (eGFR) is the CKD-EPI equation.        Lab Results   Component Value Date    WBC 6.78 12/21/2022    HGB 14.9 12/21/2022    HCT 43.6 12/21/2022    MCV 96 12/21/2022     12/21/2022     Lab Results   Component Value Date    CHOL 141 12/21/2022    CHOL 173 01/24/2022    CHOL 155 12/15/2020     Lab Results   Component Value Date    HDL 61 12/21/2022    HDL 51 01/24/2022    HDL 49 12/15/2020     Lab Results   Component Value Date    LDLCALC 64.0 12/21/2022    LDLCALC 97.6 01/24/2022     LDLCALC 85.4 12/15/2020     Lab Results   Component Value Date    TRIG 80 12/21/2022    TRIG 122 01/24/2022    TRIG 103 12/15/2020     Lab Results   Component Value Date    CHOLHDL 43.3 12/21/2022    CHOLHDL 29.5 01/24/2022    CHOLHDL 31.6 12/15/2020     No results found for: TSH, F8NMBEV, W5VZGZV, THYROIDAB    ASSESSMENT/PLAN     Zion Guillen is a 73 y.o. male     Zion was seen today for headache and nasal congestion. Is chlorinated pool water causing chronic sinusitis? Will treat with steroids and abx and if no resolution, will refer to ENT. In the meantime, wear ear plugs and nose plugs.     Diagnoses and all orders for this visit:    Benign hypertension    Acute maxillary sinusitis, recurrence not specified    Chronic allergic rhinitis    Other orders  -     doxycycline (MONODOX) 100 MG capsule; Take 1 capsule (100 mg total) by mouth 2 (two) times daily. for 7 days  -     predniSONE (DELTASONE) 20 MG tablet; Take 1 tablet (20 mg total) by mouth every morning. for 3 days      Patient was counseled on when and how to seek emergent care.       Cristina Conde PA-C   Department of Internal Medicine - [facility]   2:28 PM

## 2023-07-11 DIAGNOSIS — I10 BENIGN HYPERTENSION: ICD-10-CM

## 2023-07-11 NOTE — TELEPHONE ENCOUNTER
Refill Encounter    PCP Visits: Recent Visits  Date Type Provider Dept   01/17/23 Office Visit Michelle Song MD [facility] Internal Medicine   12/16/22 Office Visit Michelle Song MD [facility] Internal Medicine   09/27/22 Office Visit Michelle Song MD [facility] Internal Medicine   Showing recent visits within past 360 days and meeting all other requirements  Future Appointments  No visits were found meeting these conditions.  Showing future appointments within next 720 days and meeting all other requirements     Last 3 Blood Pressure:   BP Readings from Last 3 Encounters:   07/07/23 128/76   05/31/23 122/81   04/09/23 (!) 143/76     Preferred Pharmacy:   [facility]/pharmacy #[id number] [place], [place] - [address]  [address] [address]  Phone: [phone] Fax: [phone]    Requested RX:  Requested Prescriptions     Pending Prescriptions Disp Refills    amLODIPine (NORVASC) 10 MG tablet 90 tablet 3     Sig: Take 1 tablet (10 mg total) by mouth once daily.      RX Route: Normal

## 2023-07-12 RX ORDER — AMLODIPINE BESYLATE 10 MG/1
10 TABLET ORAL DAILY
Qty: 90 TABLET | Refills: 0 | Status: SHIPPED | OUTPATIENT
Start: 2023-07-12 | End: 2023-10-17 | Stop reason: SDUPTHER

## 2023-07-18 ENCOUNTER — OFFICE VISIT (OUTPATIENT)
Dept: INTERNAL MEDICINE | Facility: CLINIC | Age: 73
End: 2023-07-18
Payer: MEDICARE

## 2023-07-18 VITALS
HEART RATE: 69 BPM | SYSTOLIC BLOOD PRESSURE: 120 MMHG | OXYGEN SATURATION: 97 % | DIASTOLIC BLOOD PRESSURE: 70 MMHG | BODY MASS INDEX: 24.48 KG/M2 | WEIGHT: 152.31 LBS | HEIGHT: 66 IN

## 2023-07-18 DIAGNOSIS — I10 ESSENTIAL HYPERTENSION: ICD-10-CM

## 2023-07-18 DIAGNOSIS — L82.1 SEBORRHEIC KERATOSIS: ICD-10-CM

## 2023-07-18 DIAGNOSIS — E55.9 VITAMIN D DEFICIENCY: ICD-10-CM

## 2023-07-18 DIAGNOSIS — E66.3 OVERWEIGHT (BMI 25.0-29.9): ICD-10-CM

## 2023-07-18 DIAGNOSIS — I70.0 AORTIC ATHEROSCLEROSIS: ICD-10-CM

## 2023-07-18 DIAGNOSIS — C44.91 BASAL CELL CARCINOMA (BCC), UNSPECIFIED SITE: ICD-10-CM

## 2023-07-18 DIAGNOSIS — N40.0 BENIGN PROSTATIC HYPERPLASIA, UNSPECIFIED WHETHER LOWER URINARY TRACT SYMPTOMS PRESENT: ICD-10-CM

## 2023-07-18 DIAGNOSIS — E78.2 MIXED HYPERLIPIDEMIA: ICD-10-CM

## 2023-07-18 DIAGNOSIS — M17.0 BILATERAL PRIMARY OSTEOARTHRITIS OF KNEE: ICD-10-CM

## 2023-07-18 DIAGNOSIS — M54.50 CHRONIC LOW BACK PAIN, UNSPECIFIED BACK PAIN LATERALITY, UNSPECIFIED WHETHER SCIATICA PRESENT: ICD-10-CM

## 2023-07-18 DIAGNOSIS — G89.29 CHRONIC LOW BACK PAIN, UNSPECIFIED BACK PAIN LATERALITY, UNSPECIFIED WHETHER SCIATICA PRESENT: ICD-10-CM

## 2023-07-18 DIAGNOSIS — J30.9 ALLERGIC RHINITIS, UNSPECIFIED SEASONALITY, UNSPECIFIED TRIGGER: ICD-10-CM

## 2023-07-18 DIAGNOSIS — Z00.00 ENCOUNTER FOR PREVENTIVE HEALTH EXAMINATION: Primary | ICD-10-CM

## 2023-07-18 PROCEDURE — 1159F PR MEDICATION LIST DOCUMENTED IN MEDICAL RECORD: ICD-10-PCS | Mod: HCNC,CPTII,S$GLB, | Performed by: NURSE PRACTITIONER

## 2023-07-18 PROCEDURE — 4010F ACE/ARB THERAPY RXD/TAKEN: CPT | Mod: HCNC,CPTII,S$GLB, | Performed by: NURSE PRACTITIONER

## 2023-07-18 PROCEDURE — 3008F PR BODY MASS INDEX (BMI) DOCUMENTED: ICD-10-PCS | Mod: HCNC,CPTII,S$GLB, | Performed by: NURSE PRACTITIONER

## 2023-07-18 PROCEDURE — 1170F PR FUNCTIONAL STATUS ASSESSED: ICD-10-PCS | Mod: HCNC,CPTII,S$GLB, | Performed by: NURSE PRACTITIONER

## 2023-07-18 PROCEDURE — 3074F SYST BP LT 130 MM HG: CPT | Mod: HCNC,CPTII,S$GLB, | Performed by: NURSE PRACTITIONER

## 2023-07-18 PROCEDURE — 1126F AMNT PAIN NOTED NONE PRSNT: CPT | Mod: HCNC,CPTII,S$GLB, | Performed by: NURSE PRACTITIONER

## 2023-07-18 PROCEDURE — 1160F RVW MEDS BY RX/DR IN RCRD: CPT | Mod: HCNC,CPTII,S$GLB, | Performed by: NURSE PRACTITIONER

## 2023-07-18 PROCEDURE — 3288F PR FALLS RISK ASSESSMENT DOCUMENTED: ICD-10-PCS | Mod: HCNC,CPTII,S$GLB, | Performed by: NURSE PRACTITIONER

## 2023-07-18 PROCEDURE — 1126F PR PAIN SEVERITY QUANTIFIED, NO PAIN PRESENT: ICD-10-PCS | Mod: HCNC,CPTII,S$GLB, | Performed by: NURSE PRACTITIONER

## 2023-07-18 PROCEDURE — 1101F PR PT FALLS ASSESS DOC 0-1 FALLS W/OUT INJ PAST YR: ICD-10-PCS | Mod: HCNC,CPTII,S$GLB, | Performed by: NURSE PRACTITIONER

## 2023-07-18 PROCEDURE — G0439 PR MEDICARE ANNUAL WELLNESS SUBSEQUENT VISIT: ICD-10-PCS | Mod: HCNC,S$GLB,, | Performed by: NURSE PRACTITIONER

## 2023-07-18 PROCEDURE — 3078F PR MOST RECENT DIASTOLIC BLOOD PRESSURE < 80 MM HG: ICD-10-PCS | Mod: HCNC,CPTII,S$GLB, | Performed by: NURSE PRACTITIONER

## 2023-07-18 PROCEDURE — G0439 PPPS, SUBSEQ VISIT: HCPCS | Mod: HCNC,S$GLB,, | Performed by: NURSE PRACTITIONER

## 2023-07-18 PROCEDURE — 1170F FXNL STATUS ASSESSED: CPT | Mod: HCNC,CPTII,S$GLB, | Performed by: NURSE PRACTITIONER

## 2023-07-18 PROCEDURE — 1160F PR REVIEW ALL MEDS BY PRESCRIBER/CLIN PHARMACIST DOCUMENTED: ICD-10-PCS | Mod: HCNC,CPTII,S$GLB, | Performed by: NURSE PRACTITIONER

## 2023-07-18 PROCEDURE — 4010F PR ACE/ARB THEARPY RXD/TAKEN: ICD-10-PCS | Mod: HCNC,CPTII,S$GLB, | Performed by: NURSE PRACTITIONER

## 2023-07-18 PROCEDURE — 3008F BODY MASS INDEX DOCD: CPT | Mod: HCNC,CPTII,S$GLB, | Performed by: NURSE PRACTITIONER

## 2023-07-18 PROCEDURE — 3074F PR MOST RECENT SYSTOLIC BLOOD PRESSURE < 130 MM HG: ICD-10-PCS | Mod: HCNC,CPTII,S$GLB, | Performed by: NURSE PRACTITIONER

## 2023-07-18 PROCEDURE — 3078F DIAST BP <80 MM HG: CPT | Mod: HCNC,CPTII,S$GLB, | Performed by: NURSE PRACTITIONER

## 2023-07-18 PROCEDURE — 3288F FALL RISK ASSESSMENT DOCD: CPT | Mod: HCNC,CPTII,S$GLB, | Performed by: NURSE PRACTITIONER

## 2023-07-18 PROCEDURE — 99999 PR PBB SHADOW E&M-EST. PATIENT-LVL IV: CPT | Mod: PBBFAC,HCNC,, | Performed by: NURSE PRACTITIONER

## 2023-07-18 PROCEDURE — 1159F MED LIST DOCD IN RCRD: CPT | Mod: HCNC,CPTII,S$GLB, | Performed by: NURSE PRACTITIONER

## 2023-07-18 PROCEDURE — 1101F PT FALLS ASSESS-DOCD LE1/YR: CPT | Mod: HCNC,CPTII,S$GLB, | Performed by: NURSE PRACTITIONER

## 2023-07-18 PROCEDURE — 99999 PR PBB SHADOW E&M-EST. PATIENT-LVL IV: ICD-10-PCS | Mod: PBBFAC,HCNC,, | Performed by: NURSE PRACTITIONER

## 2023-07-18 NOTE — PROGRESS NOTES
"  Zion Guillen presented for a  Medicare AWV and comprehensive Health Risk Assessment today. The following components were reviewed and updated:    Medical history  Family History  Social history  Allergies and Current Medications  Health Risk Assessment  Health Maintenance  Care Team         ** See Completed Assessments for Annual Wellness Visit within the encounter summary.**         The following assessments were completed:  Living Situation  CAGE  Depression Screening  Timed Get Up and Go  Whisper Test  Cognitive Function Screening  Nutrition Screening  ADL Screening  PAQ Screening          Vitals:    07/18/23 1520   BP: 120/70   BP Location: Right arm   Patient Position: Sitting   Pulse: 69   SpO2: 97%   Weight: 69.1 kg (152 lb 5.4 oz)   Height: 5' 6" (1.676 m)     Body mass index is 24.59 kg/m².    Physical Exam  Vitals reviewed.   Constitutional:       Appearance: He is well-developed.   HENT:      Head: Normocephalic and atraumatic. Not macrocephalic and not microcephalic. No raccoon eyes, Niño's sign, abrasion, contusion, right periorbital erythema, left periorbital erythema or laceration. Hair is normal.      Right Ear: No decreased hearing noted. No laceration, drainage, swelling or tenderness. No middle ear effusion. No foreign body. No mastoid tenderness. No hemotympanum. Tympanic membrane is not injected, scarred, perforated, erythematous, retracted or bulging. Tympanic membrane has normal mobility.      Left Ear: No decreased hearing noted. No laceration, drainage, swelling or tenderness.  No middle ear effusion. No foreign body. No mastoid tenderness. No hemotympanum. Tympanic membrane is not injected, scarred, perforated, erythematous, retracted or bulging. Tympanic membrane has normal mobility.      Nose: Nose normal. No nasal deformity, laceration or mucosal edema.      Mouth/Throat:      Pharynx: Uvula midline.   Eyes:      General: Lids are normal. No scleral icterus.     Conjunctiva/sclera: " Conjunctivae normal.   Neck:      Thyroid: No thyroid mass or thyromegaly.      Trachea: Trachea normal.   Cardiovascular:      Rate and Rhythm: Normal rate and regular rhythm.   Pulmonary:      Effort: Pulmonary effort is normal. No respiratory distress.      Breath sounds: Normal breath sounds.   Abdominal:      Palpations: Abdomen is soft.   Musculoskeletal:         General: Normal range of motion.      Cervical back: Neck supple. No edema or erythema. No spinous process tenderness or muscular tenderness. Normal range of motion.   Lymphadenopathy:      Head:      Right side of head: No submental, submandibular, tonsillar, preauricular or posterior auricular adenopathy.      Left side of head: No submental, submandibular, tonsillar, preauricular, posterior auricular or occipital adenopathy.   Skin:     General: Skin is warm and dry.   Neurological:      Mental Status: He is alert and oriented to person, place, and time.      Cranial Nerves: No cranial nerve deficit.      Sensory: No sensory deficit.   Psychiatric:         Behavior: Behavior normal.         Thought Content: Thought content normal.         Judgment: Judgment normal.           Diagnoses and health risks identified today and associated recommendations/orders:    1. Encounter for preventive health examination  Annual Health Risk Assessment (HRA) visit today.  Counseling and referral of health maintenance and preventative health measures performed.  Patient given annual wellness paperwork to take home.  Encouraged to return in 1 year for subsequent HRA visit.     2. Aortic atherosclerosis  Chronic. Stable. Continue current treatment plan as previously prescribed by PCP.    3. Essential hypertension  Chronic. Stable. Controlled. Encouraged to increase exercise as tolerated (moderate-intensity aerobic activity and muscle-strengthening activities) improve diet to heart healthy, low sodium diet.  Continue current treatment plan as previously prescribed by  PCP.    4. Mixed hyperlipidemia  Chronic. Stable. Continue current treatment plan as previously prescribed by PCP.    5. Seborrheic keratosis  Chronic. Stable. Continue current treatment plan as previously prescribed by PCP.    6. Allergic rhinitis, unspecified seasonality, unspecified trigger  Chronic. Stable. Continue current treatment plan as previously prescribed by PCP.    7. Benign prostatic hyperplasia, unspecified whether lower urinary tract symptoms present  Chronic. Stable. Continue current treatment plan as previously prescribed by PCP.    8. Basal cell carcinoma (BCC), unspecified site  Chronic. Stable. Continue current treatment plan as previously prescribed by PCP.    9. Vitamin D deficiency  Chronic. Stable. Continue current treatment plan as previously prescribed by PCP.    10. Overweight (BMI 25.0-29.9)  Chronic. Stable. Encouraged to increase exercise as tolerated and improve diet to heart healthy, low sodium diet. Continue current treatment plan as previously prescribed by PCP.    11. Chronic low back pain, unspecified back pain laterality, unspecified whether sciatica present  Chronic. Stable. Continue current treatment plan as previously prescribed by PCP.    12. Bilateral primary osteoarthritis of knee  Chronic. Stable. Continue current treatment plan as previously prescribed by PCP.      Provided Zion with a 5-10 year written screening schedule and personal prevention plan. Recommendations were developed using the USPSTF age appropriate recommendations. Education, counseling, and referrals were provided as needed. After Visit Summary printed and given to patient which includes a list of additional screenings\tests needed.      I offered to discuss end of life issues, including information on how to make advance directives that the patient could use to name someone who would make medical decisions on their behalf if they became too ill to make themselves.    ___Patient declined  _X_Patient  is interested, I provided paper work and offered to discuss.    Follow up in about 1 year (around 7/18/2024).    IQRA Yoo offered to discuss advanced care planning, including how to pick a person who would make decisions for you if you were unable to make them for yourself, called a health care power of , and what kind of decisions you might make such as use of life sustaining treatments such as ventilators and tube feeding when faced with a life limiting illness recorded on a living will that they will need to know. (How you want to be cared for as you near the end of your natural life)     X Patient is interested in learning more about how to make advanced directives.  I provided them paperwork and offered to discuss this with them.

## 2023-07-18 NOTE — PATIENT INSTRUCTIONS
Counseling and Referral of Other Preventative  (Italic type indicates deductible and co-insurance are waived)    Patient Name: Zion Guillen  Today's Date: 7/18/2023    Health Maintenance       Date Due Completion Date    COVID-19 Vaccine (5 - Pfizer series) 11/23/2022 9/28/2022    Colorectal Cancer Screening 08/16/2023 8/16/2022    Influenza Vaccine (1) 09/01/2023 9/28/2022    Override on 10/1/2020: Done    TETANUS VACCINE 01/01/2024 1/1/2014 (Done)    Override on 1/1/2014: Done    High Dose Statin 03/16/2024 3/16/2023    Lipid Panel 12/21/2027 12/21/2022    Override on 4/1/2019: Done (normal, done with prior PCP at [facility] in [place])        No orders of the defined types were placed in this encounter.      The following information is provided to all patients.  This information is to help you find resources for any of the problems found today that may be affecting your health:                Living healthy guide: www.[facility].[place].gov      Understanding Diabetes: www.diabetes.org      Eating healthy: www.cdc.gov/healthyweight      CDC home safety checklist: www.cdc.gov/steadi/patient.html      Agency on Aging: www.goea.[place].gov      Alcoholics anonymous (AA): www.aa.org      Physical Activity: www.marcel.nih.gov/xg1qrth      Tobacco use: www.quitwithusla.org

## 2023-07-26 DIAGNOSIS — I70.0 AORTIC ATHEROSCLEROSIS: ICD-10-CM

## 2023-07-26 RX ORDER — ATORVASTATIN CALCIUM 40 MG/1
TABLET, FILM COATED ORAL
Qty: 90 TABLET | Refills: 3 | OUTPATIENT
Start: 2023-07-26

## 2023-07-26 NOTE — TELEPHONE ENCOUNTER
Refill Decision Note   Zion Guillen  is requesting a refill authorization.  Brief Assessment and Rationale for Refill:  Quick Discontinue     Medication Therapy Plan:         Comments:     Note composed:2:32 PM 07/26/2023

## 2023-08-11 ENCOUNTER — PATIENT MESSAGE (OUTPATIENT)
Dept: RESEARCH | Facility: HOSPITAL | Age: 73
End: 2023-08-11
Payer: MEDICARE

## 2023-08-28 ENCOUNTER — HOSPITAL ENCOUNTER (OUTPATIENT)
Dept: RADIOLOGY | Facility: OTHER | Age: 73
Discharge: HOME | End: 2023-08-28
Attending: STUDENT IN AN ORGANIZED HEALTH CARE EDUCATION/TRAINING PROGRAM
Payer: MEDICARE

## 2023-08-28 ENCOUNTER — OFFICE VISIT (OUTPATIENT)
Dept: INTERNAL MEDICINE | Facility: CLINIC | Age: 73
End: 2023-08-28
Payer: MEDICARE

## 2023-08-28 VITALS
OXYGEN SATURATION: 99 % | HEART RATE: 60 BPM | DIASTOLIC BLOOD PRESSURE: 77 MMHG | SYSTOLIC BLOOD PRESSURE: 127 MMHG | WEIGHT: 152.88 LBS | BODY MASS INDEX: 24.68 KG/M2

## 2023-08-28 DIAGNOSIS — E53.8 VITAMIN B12 DEFICIENCY: ICD-10-CM

## 2023-08-28 DIAGNOSIS — I70.90 ATHEROSCLEROSIS: ICD-10-CM

## 2023-08-28 DIAGNOSIS — Z91.09 ENVIRONMENTAL ALLERGIES: ICD-10-CM

## 2023-08-28 DIAGNOSIS — Z85.828 HISTORY OF BASAL CELL CANCER: ICD-10-CM

## 2023-08-28 DIAGNOSIS — E78.2 MIXED HYPERLIPIDEMIA: ICD-10-CM

## 2023-08-28 DIAGNOSIS — R07.9 CHEST PAIN, UNSPECIFIED TYPE: ICD-10-CM

## 2023-08-28 DIAGNOSIS — Z12.12 SCREENING FOR COLORECTAL CANCER: ICD-10-CM

## 2023-08-28 DIAGNOSIS — Z12.11 SCREENING FOR COLORECTAL CANCER: ICD-10-CM

## 2023-08-28 DIAGNOSIS — E55.9 VITAMIN D DEFICIENCY: ICD-10-CM

## 2023-08-28 DIAGNOSIS — R73.09 OTHER ABNORMAL GLUCOSE: ICD-10-CM

## 2023-08-28 DIAGNOSIS — I71.40 ABDOMINAL AORTIC ANEURYSM (AAA) WITHOUT RUPTURE, UNSPECIFIED PART: ICD-10-CM

## 2023-08-28 DIAGNOSIS — Z00.00 HEALTH MAINTENANCE EXAMINATION: Primary | ICD-10-CM

## 2023-08-28 DIAGNOSIS — L30.4 INTERTRIGO: ICD-10-CM

## 2023-08-28 DIAGNOSIS — I10 ESSENTIAL HYPERTENSION: ICD-10-CM

## 2023-08-28 PROCEDURE — 3288F PR FALLS RISK ASSESSMENT DOCUMENTED: ICD-10-PCS | Mod: HCNC,CPTII,S$GLB, | Performed by: STUDENT IN AN ORGANIZED HEALTH CARE EDUCATION/TRAINING PROGRAM

## 2023-08-28 PROCEDURE — 3066F PR DOCUMENTATION OF TREATMENT FOR NEPHROPATHY: ICD-10-PCS | Mod: HCNC,CPTII,S$GLB, | Performed by: STUDENT IN AN ORGANIZED HEALTH CARE EDUCATION/TRAINING PROGRAM

## 2023-08-28 PROCEDURE — 1126F PR PAIN SEVERITY QUANTIFIED, NO PAIN PRESENT: ICD-10-PCS | Mod: HCNC,CPTII,S$GLB, | Performed by: STUDENT IN AN ORGANIZED HEALTH CARE EDUCATION/TRAINING PROGRAM

## 2023-08-28 PROCEDURE — 3078F DIAST BP <80 MM HG: CPT | Mod: HCNC,CPTII,S$GLB, | Performed by: STUDENT IN AN ORGANIZED HEALTH CARE EDUCATION/TRAINING PROGRAM

## 2023-08-28 PROCEDURE — 76775 US ABDOMINAL AORTA: ICD-10-PCS | Mod: 26,HCNC,, | Performed by: RADIOLOGY

## 2023-08-28 PROCEDURE — 3288F FALL RISK ASSESSMENT DOCD: CPT | Mod: HCNC,CPTII,S$GLB, | Performed by: STUDENT IN AN ORGANIZED HEALTH CARE EDUCATION/TRAINING PROGRAM

## 2023-08-28 PROCEDURE — 3008F PR BODY MASS INDEX (BMI) DOCUMENTED: ICD-10-PCS | Mod: HCNC,CPTII,S$GLB, | Performed by: STUDENT IN AN ORGANIZED HEALTH CARE EDUCATION/TRAINING PROGRAM

## 2023-08-28 PROCEDURE — 1101F PR PT FALLS ASSESS DOC 0-1 FALLS W/OUT INJ PAST YR: ICD-10-PCS | Mod: HCNC,CPTII,S$GLB, | Performed by: STUDENT IN AN ORGANIZED HEALTH CARE EDUCATION/TRAINING PROGRAM

## 2023-08-28 PROCEDURE — 1159F MED LIST DOCD IN RCRD: CPT | Mod: HCNC,CPTII,S$GLB, | Performed by: STUDENT IN AN ORGANIZED HEALTH CARE EDUCATION/TRAINING PROGRAM

## 2023-08-28 PROCEDURE — 99214 OFFICE O/P EST MOD 30 MIN: CPT | Mod: HCNC,S$GLB,, | Performed by: STUDENT IN AN ORGANIZED HEALTH CARE EDUCATION/TRAINING PROGRAM

## 2023-08-28 PROCEDURE — 1101F PT FALLS ASSESS-DOCD LE1/YR: CPT | Mod: HCNC,CPTII,S$GLB, | Performed by: STUDENT IN AN ORGANIZED HEALTH CARE EDUCATION/TRAINING PROGRAM

## 2023-08-28 PROCEDURE — 3074F SYST BP LT 130 MM HG: CPT | Mod: HCNC,CPTII,S$GLB, | Performed by: STUDENT IN AN ORGANIZED HEALTH CARE EDUCATION/TRAINING PROGRAM

## 2023-08-28 PROCEDURE — 3008F BODY MASS INDEX DOCD: CPT | Mod: HCNC,CPTII,S$GLB, | Performed by: STUDENT IN AN ORGANIZED HEALTH CARE EDUCATION/TRAINING PROGRAM

## 2023-08-28 PROCEDURE — 76775 US EXAM ABDO BACK WALL LIM: CPT | Mod: 26,HCNC,, | Performed by: RADIOLOGY

## 2023-08-28 PROCEDURE — 3044F PR MOST RECENT HEMOGLOBIN A1C LEVEL <7.0%: ICD-10-PCS | Mod: HCNC,CPTII,S$GLB, | Performed by: STUDENT IN AN ORGANIZED HEALTH CARE EDUCATION/TRAINING PROGRAM

## 2023-08-28 PROCEDURE — 3078F PR MOST RECENT DIASTOLIC BLOOD PRESSURE < 80 MM HG: ICD-10-PCS | Mod: HCNC,CPTII,S$GLB, | Performed by: STUDENT IN AN ORGANIZED HEALTH CARE EDUCATION/TRAINING PROGRAM

## 2023-08-28 PROCEDURE — 99999 PR PBB SHADOW E&M-EST. PATIENT-LVL IV: ICD-10-PCS | Mod: PBBFAC,HCNC,, | Performed by: STUDENT IN AN ORGANIZED HEALTH CARE EDUCATION/TRAINING PROGRAM

## 2023-08-28 PROCEDURE — 76775 US EXAM ABDO BACK WALL LIM: CPT | Mod: TC,HCNC

## 2023-08-28 PROCEDURE — 4010F PR ACE/ARB THEARPY RXD/TAKEN: ICD-10-PCS | Mod: HCNC,CPTII,S$GLB, | Performed by: STUDENT IN AN ORGANIZED HEALTH CARE EDUCATION/TRAINING PROGRAM

## 2023-08-28 PROCEDURE — 3044F HG A1C LEVEL LT 7.0%: CPT | Mod: HCNC,CPTII,S$GLB, | Performed by: STUDENT IN AN ORGANIZED HEALTH CARE EDUCATION/TRAINING PROGRAM

## 2023-08-28 PROCEDURE — 99999 PR PBB SHADOW E&M-EST. PATIENT-LVL IV: CPT | Mod: PBBFAC,HCNC,, | Performed by: STUDENT IN AN ORGANIZED HEALTH CARE EDUCATION/TRAINING PROGRAM

## 2023-08-28 PROCEDURE — 3061F NEG MICROALBUMINURIA REV: CPT | Mod: HCNC,CPTII,S$GLB, | Performed by: STUDENT IN AN ORGANIZED HEALTH CARE EDUCATION/TRAINING PROGRAM

## 2023-08-28 PROCEDURE — 3066F NEPHROPATHY DOC TX: CPT | Mod: HCNC,CPTII,S$GLB, | Performed by: STUDENT IN AN ORGANIZED HEALTH CARE EDUCATION/TRAINING PROGRAM

## 2023-08-28 PROCEDURE — 99214 PR OFFICE/OUTPT VISIT, EST, LEVL IV, 30-39 MIN: ICD-10-PCS | Mod: HCNC,S$GLB,, | Performed by: STUDENT IN AN ORGANIZED HEALTH CARE EDUCATION/TRAINING PROGRAM

## 2023-08-28 PROCEDURE — 1159F PR MEDICATION LIST DOCUMENTED IN MEDICAL RECORD: ICD-10-PCS | Mod: HCNC,CPTII,S$GLB, | Performed by: STUDENT IN AN ORGANIZED HEALTH CARE EDUCATION/TRAINING PROGRAM

## 2023-08-28 PROCEDURE — 1126F AMNT PAIN NOTED NONE PRSNT: CPT | Mod: HCNC,CPTII,S$GLB, | Performed by: STUDENT IN AN ORGANIZED HEALTH CARE EDUCATION/TRAINING PROGRAM

## 2023-08-28 PROCEDURE — 3061F PR NEG MICROALBUMINURIA RESULT DOCUMENTED/REVIEW: ICD-10-PCS | Mod: HCNC,CPTII,S$GLB, | Performed by: STUDENT IN AN ORGANIZED HEALTH CARE EDUCATION/TRAINING PROGRAM

## 2023-08-28 PROCEDURE — 3074F PR MOST RECENT SYSTOLIC BLOOD PRESSURE < 130 MM HG: ICD-10-PCS | Mod: HCNC,CPTII,S$GLB, | Performed by: STUDENT IN AN ORGANIZED HEALTH CARE EDUCATION/TRAINING PROGRAM

## 2023-08-28 PROCEDURE — 4010F ACE/ARB THERAPY RXD/TAKEN: CPT | Mod: HCNC,CPTII,S$GLB, | Performed by: STUDENT IN AN ORGANIZED HEALTH CARE EDUCATION/TRAINING PROGRAM

## 2023-08-28 RX ORDER — CLOTRIMAZOLE AND BETAMETHASONE DIPROPIONATE 10; .64 MG/G; MG/G
CREAM TOPICAL 2 TIMES DAILY
Qty: 45 G | Refills: 0 | Status: SHIPPED | OUTPATIENT
Start: 2023-08-28

## 2023-08-28 RX ORDER — AZELASTINE 1 MG/ML
2 SPRAY, METERED NASAL 2 TIMES DAILY PRN
Qty: 30 ML | Refills: 1 | Status: SHIPPED | OUTPATIENT
Start: 2023-08-28 | End: 2023-09-19

## 2023-08-28 NOTE — PROGRESS NOTES
Subjective:       Patient ID: Zion Guillen is a 73 y.o. male.    Chief Complaint: Health maintenance examination [Z00.00]    Patient is new to me, here to establish care.    Health maintenance -   FOBT negative 2022.  Denies family history of colorectal cancer.  Family history of cardiac disease.  Denies family history of prostate cancer.  UTD on Tdap, COVID primary/bivalent, PPSV23, PCV13, shingles vaccinations.  Due for COVID bivalent  vaccinations.  Started smoking at age early teens, at most 1 PPD. Stopped smoking around 1993.  Occasional cigar use after, nothing currently.  Drinks alcohol daily, 1-2 drinks per sitting.  Denies drug use.  Completed HIV and hepatitis C screening.  Due for diabetes screening.  Lab Results       Component                Value               Date                       HGBA1C                   5.4                 12/21/2022              HTN -   Currently prescribed losartan and amlodipine.  Patient endorses taking medication as directed.  Denies side effects or concerns while taking medication.  Patient endorses home -120's  Due for micro albumin creatinine ratio.   BP Readings from Last 5 Encounters:  08/28/23 : 127/77  07/18/23 : 120/70  07/07/23 : 128/76  05/31/23 : 122/81  04/09/23 : (!) 143/76    HLD -   Endorses taking atorvastatin as directed  Denies side effects or concerns while taking medication  Lab Results       Component                Value               Date                       CHOL                     141                 12/21/2022            Lab Results       Component                Value               Date                       TRIG                     80                  12/21/2022            Lab Results       Component                Value               Date                       LDLCALC                  64.0                12/21/2022            Lab Results       Component                Value               Date                       HDL                "       61                  12/21/2022            Due for lipid recheck.    Vitamin D deficiency -  Currently taking vitamin D3 2000 IU daily supplementation.  Lab Results       Component                Value               Date                       [id number]             49                  01/24/2022                 [id number]             41                  12/15/2020                 [id number]             40                  09/11/2019              Taking Zyrtec for environmental allergies with good effect  Performing nasal saline rinses   Felt Flonase was making symptoms worse  Symptoms currently well controlled   Not longer requiring albuterol inhaler    History of basal cell   Following with Dr. Huang routinely for dermatology.    AAA -  Screening for AAA in SEP2019 showed "Mild aneurysmal dilatation of the abdominal aorta and proximal bilateral iliac arteries as above."  Also with aortic atherosclerosis    Was standing up in the pool on Friday  Began with sharp left sided chest pain  Lasted for about 2 hours and self resolved  Also painful with deep breath  Walks 3 miles in the morning  Swims 1 km three times weekly  Denies CP or other symptoms with exertion     Endorses symptoms of intertrigo           Review of Systems   Constitutional:  Negative for appetite change, chills, fatigue, fever and unexpected weight change.   Respiratory:  Negative for cough and shortness of breath.    Cardiovascular:  Positive for chest pain. Negative for palpitations and leg swelling.   Gastrointestinal:  Negative for abdominal pain, constipation, diarrhea, nausea and vomiting.   Genitourinary:  Negative for difficulty urinating and frequency.   Skin:  Positive for rash.   Neurological:  Negative for dizziness, syncope, weakness and headaches.         Current Outpatient Medications   Medication Instructions    albuterol (VENTOLIN HFA) 90 mcg/actuation inhaler 2 puffs, Inhalation, Every 6 hours PRN, Rescue    amLODIPine " (NORVASC) 10 mg, Oral, Daily    atorvastatin (LIPITOR) 40 mg, Oral, Daily    benzonatate (TESSALON) 200 mg, Oral, 3 times daily PRN    cetirizine (ZYRTEC) 10 mg, Oral, Daily    cholecalciferol, vitamin D3, (VITAMIN D3) 50 mcg (2,000 unit) Cap No dose, route, or frequency recorded.    diclofenac sodium (VOLTAREN) 2 g, Topical (Top), 4 times daily PRN    fluticasone propionate (FLONASE) 50 mcg, Each Nostril, Daily    glucosamine-chondroitin 500-400 mg tablet 2 tablets, Oral, Daily    guaiFENesin (MUCINEX) 600 mg, Oral, 2 times daily    losartan (COZAAR) 25 mg, Oral, Daily     Objective:      Vitals:    08/28/23 1022   BP: 127/77   Pulse: 60   SpO2: 99%   Weight: 69.3 kg (152 lb 14.2 oz)   PainSc: 0-No pain     Body mass index is 24.68 kg/m².    Physical Exam  Vitals reviewed.   Constitutional:       General: He is not in acute distress.     Appearance: Normal appearance. He is not ill-appearing or diaphoretic.   HENT:      Head: Normocephalic and atraumatic.      Right Ear: Tympanic membrane, ear canal and external ear normal. There is no impacted cerumen.      Left Ear: Tympanic membrane, ear canal and external ear normal. There is no impacted cerumen.      Nose: Nose normal. No rhinorrhea.      Mouth/Throat:      Mouth: Mucous membranes are moist.      Pharynx: Oropharynx is clear. No oropharyngeal exudate or posterior oropharyngeal erythema.   Eyes:      General: No scleral icterus.        Right eye: No discharge.         Left eye: No discharge.      Conjunctiva/sclera: Conjunctivae normal.   Neck:      Thyroid: No thyromegaly or thyroid tenderness.      Trachea: Trachea normal.   Cardiovascular:      Rate and Rhythm: Normal rate and regular rhythm.      Heart sounds: Normal heart sounds. No murmur heard.     No friction rub. No gallop.   Pulmonary:      Effort: Pulmonary effort is normal. No respiratory distress.      Breath sounds: Normal breath sounds. No stridor. No wheezing, rhonchi or rales.   Abdominal:       General: Bowel sounds are normal. There is no distension.      Palpations: Abdomen is soft.      Tenderness: There is no abdominal tenderness. There is no guarding or rebound.   Musculoskeletal:         General: No swelling or deformity.      Cervical back: Neck supple.   Lymphadenopathy:      Head:      Right side of head: No submandibular or posterior auricular adenopathy.      Left side of head: No submandibular or posterior auricular adenopathy.      Cervical: No cervical adenopathy.      Right cervical: No superficial, deep or posterior cervical adenopathy.     Left cervical: No superficial, deep or posterior cervical adenopathy.      Upper Body:      Right upper body: No supraclavicular adenopathy.      Left upper body: No supraclavicular adenopathy.   Skin:     General: Skin is warm and dry.   Neurological:      General: No focal deficit present.      Mental Status: He is alert. Mental status is at baseline.      Gait: Gait normal.   Psychiatric:         Mood and Affect: Mood normal.         Behavior: Behavior normal.         Assessment:       1. Health maintenance examination    2. Essential hypertension    3. Mixed hyperlipidemia    4. Vitamin D deficiency    5. Environmental allergies    6. History of basal cell cancer    7. Abdominal aortic aneurysm (AAA) without rupture, unspecified part    8. Atherosclerosis    9. Chest pain, unspecified type    10. Intertrigo    11. Vitamin B12 deficiency    12. Screening for colorectal cancer    13. Other abnormal glucose        Plan:       Essential hypertension  Continue current medications.  RTC in 6 months for follow up.  -     Comprehensive Metabolic Panel; Future  -     TSH; Future  -     CBC Auto Differential; Future  -     Microalbumin/Creatinine Ratio, Urine; Future    Mixed hyperlipidemia  Continue current medications.  RTC in 6 months for follow up.  -     Lipid Panel; Future    Vitamin D deficiency  -     Vitamin D; Future    Environmental allergies  -      azelastine (ASTELIN) 137 mcg (0.1 %) nasal spray; 2 sprays (274 mcg total) by Nasal route 2 (two) times daily as needed for Rhinitis.    History of basal cell cancer  Continue evaluation and management per dermatology.    Abdominal aortic aneurysm (AAA) without rupture, unspecified part  -     US Abdominal Aorta; Future    Atherosclerosis  -     SCHEDULED EKG 12-LEAD (to Muse); Future  -     Stress Echo Which stress agent will be used? Treadmill Exercise; Color Flow Doppler? No; Future    Chest pain, unspecified type  -     SCHEDULED EKG 12-LEAD (to Muse); Future  -     Stress Echo Which stress agent will be used? Treadmill Exercise; Color Flow Doppler? No; Future    Intertrigo  -     clotrimazole-betamethasone 1-0.05% (LOTRISONE) cream; Apply topically 2 (two) times daily.    Vitamin B12 deficiency  -     Vitamin B12; Future    Health maintenance examination  Reviewed and discussed age appropriate screenings and immunizations.  -     Comprehensive Metabolic Panel; Future  -     TSH; Future  -     Lipid Panel; Future  -     Hemoglobin A1C; Future  -     CBC Auto Differential; Future  -     Microalbumin/Creatinine Ratio, Urine; Future  -     Vitamin D; Future  -     Vitamin B12; Future    Screening for colorectal cancer  -     Cologuard Screening (Multitarget Stool DNA); Future    Other abnormal glucose  -     Hemoglobin A1C; Future      Shoshana Richards MD  8/28/2023

## 2023-09-06 ENCOUNTER — HOSPITAL ENCOUNTER (OUTPATIENT)
Dept: CARDIOLOGY | Facility: OTHER | Age: 73
Discharge: HOME | End: 2023-09-06
Attending: STUDENT IN AN ORGANIZED HEALTH CARE EDUCATION/TRAINING PROGRAM
Payer: MEDICARE

## 2023-09-06 DIAGNOSIS — R07.9 CHEST PAIN, UNSPECIFIED TYPE: ICD-10-CM

## 2023-09-06 DIAGNOSIS — I70.90 ATHEROSCLEROSIS: ICD-10-CM

## 2023-09-06 PROCEDURE — 93010 EKG 12-LEAD: ICD-10-PCS | Mod: HCNC,,, | Performed by: INTERNAL MEDICINE

## 2023-09-06 PROCEDURE — 93010 ELECTROCARDIOGRAM REPORT: CPT | Mod: HCNC,,, | Performed by: INTERNAL MEDICINE

## 2023-09-06 PROCEDURE — 93005 ELECTROCARDIOGRAM TRACING: CPT | Mod: HCNC

## 2023-09-07 DIAGNOSIS — I71.40 ABDOMINAL AORTIC ANEURYSM (AAA) WITHOUT RUPTURE, UNSPECIFIED PART: ICD-10-CM

## 2023-09-07 DIAGNOSIS — I70.90 ATHEROSCLEROSIS: Primary | ICD-10-CM

## 2023-09-07 DIAGNOSIS — R07.9 CHEST PAIN, UNSPECIFIED TYPE: ICD-10-CM

## 2023-09-08 ENCOUNTER — TELEPHONE (OUTPATIENT)
Dept: INTERNAL MEDICINE | Facility: CLINIC | Age: 73
End: 2023-09-08
Payer: MEDICARE

## 2023-09-08 NOTE — TELEPHONE ENCOUNTER
----- Message from Shoshana Richards MD sent at 9/7/2023  5:38 PM CDT -----  Please assist with scheduling cardiology appt, thank you!

## 2023-09-11 LAB — NONINV COLON CA DNA+OCC BLD SCRN STL QL: NORMAL

## 2023-09-19 DIAGNOSIS — Z91.09 ENVIRONMENTAL ALLERGIES: ICD-10-CM

## 2023-09-19 RX ORDER — AZELASTINE 1 MG/ML
2 SPRAY, METERED NASAL 2 TIMES DAILY PRN
Qty: 90 ML | Refills: 3 | Status: SHIPPED | OUTPATIENT
Start: 2023-09-19 | End: 2024-09-18

## 2023-09-19 NOTE — TELEPHONE ENCOUNTER
Refill Routing Note   Medication(s) are not appropriate for processing by [facility] for the following reason(s):      New or recently adjusted medication    [facility] action(s):  Defer Care Due:  None identified            Appointments  past 12m or future 3m with PCP    Date Provider   Last Visit   8/28/2023 Shoshana Richards MD   Next Visit   2/28/2024 Shoshana Richards MD   ED visits in past 90 days: 0        Note composed:12:32 PM 09/19/2023

## 2023-09-19 NOTE — TELEPHONE ENCOUNTER
No care due was identified.  [facility] Embedded Care Due Messages. Reference number: [id number].   9/19/2023 9:34:50 AM CDT

## 2023-09-25 LAB — NONINV COLON CA DNA+OCC BLD SCRN STL QL: NEGATIVE

## 2023-09-30 PROBLEM — I70.90 ATHEROSCLEROSIS: Status: ACTIVE | Noted: 2023-09-30

## 2023-09-30 PROBLEM — Z85.828 HISTORY OF BASAL CELL CANCER: Status: ACTIVE | Noted: 2023-09-30

## 2023-09-30 PROBLEM — Z91.09 ENVIRONMENTAL ALLERGIES: Status: ACTIVE | Noted: 2023-09-30

## 2023-09-30 PROBLEM — I71.40 ABDOMINAL AORTIC ANEURYSM (AAA) WITHOUT RUPTURE: Status: ACTIVE | Noted: 2023-09-30

## 2023-10-02 ENCOUNTER — HOSPITAL ENCOUNTER (OUTPATIENT)
Dept: CARDIOLOGY | Facility: OTHER | Age: 73
Discharge: HOME | End: 2023-10-02
Attending: STUDENT IN AN ORGANIZED HEALTH CARE EDUCATION/TRAINING PROGRAM
Payer: MEDICARE

## 2023-10-02 VITALS
HEART RATE: 53 BPM | SYSTOLIC BLOOD PRESSURE: 149 MMHG | BODY MASS INDEX: 24.43 KG/M2 | DIASTOLIC BLOOD PRESSURE: 82 MMHG | WEIGHT: 152 LBS | HEIGHT: 66 IN

## 2023-10-02 DIAGNOSIS — R07.9 CHEST PAIN, UNSPECIFIED TYPE: ICD-10-CM

## 2023-10-02 DIAGNOSIS — I70.90 ATHEROSCLEROSIS: ICD-10-CM

## 2023-10-02 LAB
BSA FOR ECHO PROCEDURE: 1.79 M2
CV ECHO LV RWT: 0.4 CM
CV STRESS BASE HR: 53 BPM
DIASTOLIC BLOOD PRESSURE: 82 MMHG
ECHO LV POSTERIOR WALL: 0.98 CM (ref 0.6–1.1)
FRACTIONAL SHORTENING: 32 % (ref 28–44)
INTERVENTRICULAR SEPTUM: 1.04 CM (ref 0.6–1.1)
LEFT INTERNAL DIMENSION IN SYSTOLE: 3.33 CM (ref 2.1–4)
LEFT VENTRICLE DIASTOLIC VOLUME INDEX: 63.78 ML/M2
LEFT VENTRICLE DIASTOLIC VOLUME: 113.53 ML
LEFT VENTRICLE MASS INDEX: 101 G/M2
LEFT VENTRICLE SYSTOLIC VOLUME INDEX: 25.3 ML/M2
LEFT VENTRICLE SYSTOLIC VOLUME: 45.09 ML
LEFT VENTRICULAR INTERNAL DIMENSION IN DIASTOLE: 4.91 CM (ref 3.5–6)
LEFT VENTRICULAR MASS: 179.02 G
OHS CV CPX 1 MINUTE RECOVERY HEART RATE: 116 BPM
OHS CV CPX 85 PERCENT MAX PREDICTED HEART RATE MALE: 124
OHS CV CPX ESTIMATED METS: 13
OHS CV CPX MAX PREDICTED HEART RATE: 146
OHS CV CPX PATIENT IS FEMALE: 0
OHS CV CPX PATIENT IS MALE: 1
OHS CV CPX PEAK DIASTOLIC BLOOD PRESSURE: 95 MMHG
OHS CV CPX PEAK HEAR RATE: 144 BPM
OHS CV CPX PEAK RATE PRESSURE PRODUCT: NORMAL
OHS CV CPX PEAK SYSTOLIC BLOOD PRESSURE: 182 MMHG
OHS CV CPX PERCENT MAX PREDICTED HEART RATE ACHIEVED: 99
OHS CV CPX RATE PRESSURE PRODUCT PRESENTING: 7897
STRESS ECHO POST EXERCISE DUR MIN: 10 MINUTES
STRESS ECHO POST EXERCISE DUR SEC: 30 SECONDS
SYSTOLIC BLOOD PRESSURE: 149 MMHG
Z-SCORE OF LEFT VENTRICULAR DIMENSION IN END DIASTOLE: -0.02
Z-SCORE OF LEFT VENTRICULAR DIMENSION IN END SYSTOLE: 0.72

## 2023-10-02 PROCEDURE — 93351 STRESS TTE COMPLETE: CPT | Mod: 26,HCNC,, | Performed by: INTERNAL MEDICINE

## 2023-10-02 PROCEDURE — 93351 STRESS ECHO (CUPID ONLY): ICD-10-PCS | Mod: 26,HCNC,, | Performed by: INTERNAL MEDICINE

## 2023-10-02 PROCEDURE — 93351 STRESS TTE COMPLETE: CPT | Mod: HCNC

## 2023-10-09 ENCOUNTER — PATIENT OUTREACH (OUTPATIENT)
Dept: ADMINISTRATIVE | Facility: HOSPITAL | Age: 73
End: 2023-10-09
Payer: MEDICARE

## 2023-10-16 ENCOUNTER — PATIENT MESSAGE (OUTPATIENT)
Dept: INTERNAL MEDICINE | Facility: CLINIC | Age: 73
End: 2023-10-16
Payer: MEDICARE

## 2023-10-20 DIAGNOSIS — I70.0 AORTIC ATHEROSCLEROSIS: ICD-10-CM

## 2023-10-20 RX ORDER — ATORVASTATIN CALCIUM 40 MG/1
40 TABLET, FILM COATED ORAL
Qty: 90 TABLET | Refills: 3 | Status: SHIPPED | OUTPATIENT
Start: 2023-10-20

## 2023-10-20 NOTE — TELEPHONE ENCOUNTER
No care due was identified.  [facility] Embedded Care Due Messages. Reference number: [id number].   10/20/2023 1:23:11 AM CDT

## 2023-10-20 NOTE — TELEPHONE ENCOUNTER
Refill Decision Note   Zion Guillen  is requesting a refill authorization.  Brief Assessment and Rationale for Refill:  Approve     Medication Therapy Plan:         Comments:     Note composed:5:47 AM 10/20/2023

## 2023-10-23 ENCOUNTER — OFFICE VISIT (OUTPATIENT)
Dept: CARDIOLOGY | Facility: CLINIC | Age: 73
End: 2023-10-23
Payer: MEDICARE

## 2023-10-23 VITALS
HEART RATE: 62 BPM | SYSTOLIC BLOOD PRESSURE: 116 MMHG | OXYGEN SATURATION: 98 % | WEIGHT: 150.81 LBS | BODY MASS INDEX: 24.34 KG/M2 | DIASTOLIC BLOOD PRESSURE: 64 MMHG

## 2023-10-23 DIAGNOSIS — I70.90 ATHEROSCLEROSIS: ICD-10-CM

## 2023-10-23 DIAGNOSIS — I71.40 ABDOMINAL AORTIC ANEURYSM (AAA) WITHOUT RUPTURE, UNSPECIFIED PART: ICD-10-CM

## 2023-10-23 DIAGNOSIS — R07.2 PRECORDIAL PAIN: ICD-10-CM

## 2023-10-23 PROCEDURE — 3066F PR DOCUMENTATION OF TREATMENT FOR NEPHROPATHY: ICD-10-PCS | Mod: HCNC,CPTII,S$GLB, | Performed by: INTERNAL MEDICINE

## 2023-10-23 PROCEDURE — 1160F RVW MEDS BY RX/DR IN RCRD: CPT | Mod: HCNC,CPTII,S$GLB, | Performed by: INTERNAL MEDICINE

## 2023-10-23 PROCEDURE — 3044F PR MOST RECENT HEMOGLOBIN A1C LEVEL <7.0%: ICD-10-PCS | Mod: HCNC,CPTII,S$GLB, | Performed by: INTERNAL MEDICINE

## 2023-10-23 PROCEDURE — 3044F HG A1C LEVEL LT 7.0%: CPT | Mod: HCNC,CPTII,S$GLB, | Performed by: INTERNAL MEDICINE

## 2023-10-23 PROCEDURE — 1101F PR PT FALLS ASSESS DOC 0-1 FALLS W/OUT INJ PAST YR: ICD-10-PCS | Mod: HCNC,CPTII,S$GLB, | Performed by: INTERNAL MEDICINE

## 2023-10-23 PROCEDURE — 3061F PR NEG MICROALBUMINURIA RESULT DOCUMENTED/REVIEW: ICD-10-PCS | Mod: HCNC,CPTII,S$GLB, | Performed by: INTERNAL MEDICINE

## 2023-10-23 PROCEDURE — 99204 PR OFFICE/OUTPT VISIT, NEW, LEVL IV, 45-59 MIN: ICD-10-PCS | Mod: HCNC,S$GLB,, | Performed by: INTERNAL MEDICINE

## 2023-10-23 PROCEDURE — 3074F SYST BP LT 130 MM HG: CPT | Mod: HCNC,CPTII,S$GLB, | Performed by: INTERNAL MEDICINE

## 2023-10-23 PROCEDURE — 99204 OFFICE O/P NEW MOD 45 MIN: CPT | Mod: HCNC,S$GLB,, | Performed by: INTERNAL MEDICINE

## 2023-10-23 PROCEDURE — 4010F ACE/ARB THERAPY RXD/TAKEN: CPT | Mod: HCNC,CPTII,S$GLB, | Performed by: INTERNAL MEDICINE

## 2023-10-23 PROCEDURE — 3288F FALL RISK ASSESSMENT DOCD: CPT | Mod: HCNC,CPTII,S$GLB, | Performed by: INTERNAL MEDICINE

## 2023-10-23 PROCEDURE — 99999 PR PBB SHADOW E&M-EST. PATIENT-LVL IV: ICD-10-PCS | Mod: PBBFAC,HCNC,, | Performed by: INTERNAL MEDICINE

## 2023-10-23 PROCEDURE — 1126F PR PAIN SEVERITY QUANTIFIED, NO PAIN PRESENT: ICD-10-PCS | Mod: HCNC,CPTII,S$GLB, | Performed by: INTERNAL MEDICINE

## 2023-10-23 PROCEDURE — 3078F DIAST BP <80 MM HG: CPT | Mod: HCNC,CPTII,S$GLB, | Performed by: INTERNAL MEDICINE

## 2023-10-23 PROCEDURE — 3008F PR BODY MASS INDEX (BMI) DOCUMENTED: ICD-10-PCS | Mod: HCNC,CPTII,S$GLB, | Performed by: INTERNAL MEDICINE

## 2023-10-23 PROCEDURE — 3061F NEG MICROALBUMINURIA REV: CPT | Mod: HCNC,CPTII,S$GLB, | Performed by: INTERNAL MEDICINE

## 2023-10-23 PROCEDURE — 3078F PR MOST RECENT DIASTOLIC BLOOD PRESSURE < 80 MM HG: ICD-10-PCS | Mod: HCNC,CPTII,S$GLB, | Performed by: INTERNAL MEDICINE

## 2023-10-23 PROCEDURE — 3066F NEPHROPATHY DOC TX: CPT | Mod: HCNC,CPTII,S$GLB, | Performed by: INTERNAL MEDICINE

## 2023-10-23 PROCEDURE — 1159F PR MEDICATION LIST DOCUMENTED IN MEDICAL RECORD: ICD-10-PCS | Mod: HCNC,CPTII,S$GLB, | Performed by: INTERNAL MEDICINE

## 2023-10-23 PROCEDURE — 1101F PT FALLS ASSESS-DOCD LE1/YR: CPT | Mod: HCNC,CPTII,S$GLB, | Performed by: INTERNAL MEDICINE

## 2023-10-23 PROCEDURE — 4010F PR ACE/ARB THEARPY RXD/TAKEN: ICD-10-PCS | Mod: HCNC,CPTII,S$GLB, | Performed by: INTERNAL MEDICINE

## 2023-10-23 PROCEDURE — 3008F BODY MASS INDEX DOCD: CPT | Mod: HCNC,CPTII,S$GLB, | Performed by: INTERNAL MEDICINE

## 2023-10-23 PROCEDURE — 1159F MED LIST DOCD IN RCRD: CPT | Mod: HCNC,CPTII,S$GLB, | Performed by: INTERNAL MEDICINE

## 2023-10-23 PROCEDURE — 3074F PR MOST RECENT SYSTOLIC BLOOD PRESSURE < 130 MM HG: ICD-10-PCS | Mod: HCNC,CPTII,S$GLB, | Performed by: INTERNAL MEDICINE

## 2023-10-23 PROCEDURE — 1160F PR REVIEW ALL MEDS BY PRESCRIBER/CLIN PHARMACIST DOCUMENTED: ICD-10-PCS | Mod: HCNC,CPTII,S$GLB, | Performed by: INTERNAL MEDICINE

## 2023-10-23 PROCEDURE — 1126F AMNT PAIN NOTED NONE PRSNT: CPT | Mod: HCNC,CPTII,S$GLB, | Performed by: INTERNAL MEDICINE

## 2023-10-23 PROCEDURE — 99999 PR PBB SHADOW E&M-EST. PATIENT-LVL IV: CPT | Mod: PBBFAC,HCNC,, | Performed by: INTERNAL MEDICINE

## 2023-10-23 PROCEDURE — 3288F PR FALLS RISK ASSESSMENT DOCUMENTED: ICD-10-PCS | Mod: HCNC,CPTII,S$GLB, | Performed by: INTERNAL MEDICINE

## 2023-10-23 NOTE — PROGRESS NOTES
[facility]    Chief Complaint  Chief Complaint   Patient presents with    Chest Pain    Abnormal ECG       HPI:    In August, the patient after the episode of left precordial discomfort.  Felt like he sticking pain located a finger tip in the mid part of his left precordium.  Lasted a few seconds.  No associated symptoms.  May have had a similar symptom a few years in the past.  Can now be reproduced with localized pressure.  He is active and exercises regularly.  No problems with exertional dyspnea or chest discomfort.  Had a stress test on which he did well with no evidence of ischemia.    Medications  Current Outpatient Medications   Medication Sig Dispense Refill    amLODIPine (NORVASC) 10 MG tablet Take 1 tablet (10 mg total) by mouth once daily. 90 tablet 3    atorvastatin (LIPITOR) 40 MG tablet TAKE 1 TABLET BY MOUTH EVERY DAY 90 tablet 3    losartan (COZAAR) 25 MG tablet Take 1 tablet (25 mg total) by mouth once daily. 90 tablet 3    albuterol (VENTOLIN HFA) 90 mcg/actuation inhaler Inhale 2 puffs into the lungs every 6 (six) hours as needed for Wheezing. Rescue 18 g 3    azelastine (ASTELIN) 137 mcg (0.1 %) nasal spray 2 SPRAYS (274 MCG TOTAL) BY NASAL ROUTE 2 (TWO) TIMES DAILY AS NEEDED FOR RHINITIS. 90 mL 3    cetirizine (ZYRTEC) 10 MG tablet Take 10 mg by mouth once daily.      cholecalciferol, vitamin D3, (VITAMIN D3) 50 mcg (2,000 unit) Cap       clotrimazole-betamethasone 1-0.05% (LOTRISONE) cream Apply topically 2 (two) times daily. 45 g 0    diclofenac sodium (VOLTAREN) 1 % Gel Apply 2 g topically 4 (four) times daily as needed (pain). 100 g 11    glucosamine-chondroitin 500-400 mg tablet Take 2 tablets by mouth once daily.       No current facility-administered medications for this visit.        History  Past Medical History:   Diagnosis Date    Arthritis     BCC (basal cell carcinoma of skin) 7/1/2022    BPH (benign prostatic hyperplasia)     Hyperlipidemia     Hypertension       Past Surgical History:   Procedure Laterality Date    COLONOSCOPY  2006    CYSTOURETHROSCOPY  05/15/2019    HERNIA REPAIR  2017    umbilical, bilateral inguinal    HERNIA REPAIR      KNEE ARTHROSCOPY Left     left knee meniscus repair    PROSTATE SURGERY  10/25/2018    TRANSURETHRAL RESECTION OF PROSTATE  10/25/2018     Social History     Socioeconomic History    Marital status:    Tobacco Use    Smoking status: Former     Current packs/day: 0.00     Average packs/day: 1 pack/day for 30.0 years (30.0 ttl pk-yrs)     Types: Cigarettes     Start date:      Quit date:      Years since quittin.8    Smokeless tobacco: Never   Substance and Sexual Activity    Alcohol use: Yes     Alcohol/week: 9.0 standard drinks of alcohol     Types: 6 Glasses of wine, 3 Shots of liquor per week     Comment: socially    Drug use: No    Sexual activity: Yes     Partners: Female     Social Determinants of Health     Financial Resource Strain: Unknown (10/20/2023)    Overall Financial Resource Strain (CARDIA)     Difficulty of Paying Living Expenses: Patient refused   Food Insecurity: Unknown (10/20/2023)    Hunger Vital Sign     Worried About Running Out of Food in the Last Year: Patient refused     Ran Out of Food in the Last Year: Patient refused   Transportation Needs: No Transportation Needs (10/20/2023)    PRAPARE - Transportation     Lack of Transportation (Medical): No     Lack of Transportation (Non-Medical): No   Physical Activity: Sufficiently Active (10/20/2023)    Exercise Vital Sign     Days of Exercise per Week: 5 days     Minutes of Exercise per Session: 40 min   Stress: No Stress Concern Present (10/20/2023)    [place] [employer] - Occupational Stress Questionnaire     Feeling of Stress : Only a little   Social Connections: Unknown (10/20/2023)    Social Connection and Isolation Panel [NHANES]     Frequency of Communication with Friends and Family: Patient refused      Frequency of Social Gatherings with Friends and Family: Once a week     Active Member of Clubs or Organizations: Patient refused     Attends Club or Organization Meetings: Patient refused     Marital Status:    Housing Stability: Unknown (10/20/2023)    Housing Stability Vital Sign     Unable to Pay for Housing in the Last Year: Patient refused     Number of Places Lived in the Last Year: 1     Unstable Housing in the Last Year: No     Family History   Problem Relation Age of Onset    Uterine cancer Mother          at 78    Arthritis Mother     Cancer Mother     Coronary artery disease Father     Heart attack Father          at 72    Hypertension Father     Cataracts Neg Hx     Glaucoma Neg Hx     Macular degeneration Neg Hx     Prostate cancer Neg Hx     Colon cancer Neg Hx         Allergies  Review of patient's allergies indicates:  No Known Allergies    Review of Systems   Review of Systems   Constitutional: Negative for malaise/fatigue, weight gain and weight loss.   Eyes:  Negative for visual disturbance.   Cardiovascular:  Positive for chest pain. Negative for claudication, cyanosis, dyspnea on exertion, irregular heartbeat, leg swelling, near-syncope, orthopnea, palpitations, paroxysmal nocturnal dyspnea and syncope.   Respiratory:  Negative for cough, hemoptysis, shortness of breath, sleep disturbances due to breathing and wheezing.    Hematologic/Lymphatic: Negative for bleeding problem. Does not bruise/bleed easily.   Skin:  Negative for poor wound healing.   Musculoskeletal:  Negative for muscle cramps and myalgias.   Gastrointestinal:  Negative for abdominal pain, anorexia, diarrhea, heartburn, hematemesis, hematochezia, melena, nausea and vomiting.   Genitourinary:  Negative for hematuria and nocturia.   Neurological:  Negative for excessive daytime sleepiness, dizziness, focal weakness, light-headedness and weakness.       Physical Exam  Vitals:    10/23/23 1406   BP: 116/64   Pulse:  62     Wt Readings from Last 1 Encounters:   10/23/23 68.4 kg (150 lb 12.8 oz)     Physical Exam  Vitals and nursing note reviewed.   Constitutional:       General: He is not in acute distress.     Appearance: He is not toxic-appearing or diaphoretic.   HENT:      Head: Normocephalic and atraumatic.      Mouth/Throat:      Lips: Pink.      Mouth: Mucous membranes are moist.   Eyes:      General: No scleral icterus.     Conjunctiva/sclera: Conjunctivae normal.   Neck:      Thyroid: No thyromegaly.      Vascular: No carotid bruit, hepatojugular reflux or JVD.      Trachea: Trachea normal.   Cardiovascular:      Rate and Rhythm: Normal rate and regular rhythm. No extrasystoles are present.     Chest Wall: PMI is not displaced.      Pulses:           Carotid pulses are 2+ on the right side and 2+ on the left side.       Radial pulses are 2+ on the right side and 2+ on the left side.        Dorsalis pedis pulses are 2+ on the right side and 2+ on the left side.        Posterior tibial pulses are 2+ on the right side and 2+ on the left side.      Heart sounds: S1 normal and S2 normal. No murmur heard.     No friction rub. No S3 or S4 sounds.   Pulmonary:      Effort: Pulmonary effort is normal. No tachypnea, bradypnea, accessory muscle usage or respiratory distress.      Breath sounds: Normal breath sounds and air entry. No decreased breath sounds, wheezing, rhonchi or rales.   Chest:      Chest wall: Tenderness present.   Abdominal:      General: Bowel sounds are normal. There is no distension or abdominal bruit.      Palpations: Abdomen is soft. There is no hepatomegaly, splenomegaly or pulsatile mass.      Tenderness: There is no abdominal tenderness.   Musculoskeletal:         General: No tenderness or deformity.      Right lower leg: No edema.      Left lower leg: No edema.   Skin:     General: Skin is warm and dry.      Capillary Refill: Capillary refill takes less than 2 seconds.      Coloration: Skin is not  cyanotic or pale.      Nails: There is no clubbing.   Neurological:      General: No focal deficit present.      Mental Status: He is alert and oriented to person, place, and time.   Psychiatric:         Attention and Perception: Attention normal.         Mood and Affect: Mood normal.         Speech: Speech normal.         Behavior: Behavior normal. Behavior is cooperative.         Labs  Patient Outreach on 10/09/2023   Component Date Value Ref Range Status    Fecal Immunochemical Test (iFOBT) 06/07/2023 negative   Final   Hospital Outpatient Visit on 10/02/2023   Component Date Value Ref Range Status    BSA 10/02/2023 1.79  m2 Final    85% Max Predicted HR 10/02/2023 124   Final    Max Predicted HR 10/02/2023 146   Final    [facility] CV CPX PATIENT IS MALE 10/02/2023 1.0   Final    [facility] CV CPX PATIENT IS FEMALE 10/02/2023 0.0   Final    LVIDd 10/02/2023 4.91  3.5 - 6.0 cm Final    LV Systolic Volume 10/02/2023 45.09  mL Final    LV Systolic Volume Index 10/02/2023 25.3  mL/m2 Final    LVIDs 10/02/2023 3.33  2.1 - 4.0 cm Final    LV Diastolic Volume 10/02/2023 113.53  mL Final    LV Diastolic Volume Index 10/02/2023 63.78  mL/m2 Final    IVS 10/02/2023 1.04  0.6 - 1.1 cm Final    FS 10/02/2023 32  28 - 44 % Final    Left Ventricle Relative Wall Thick* 10/02/2023 0.40  cm Final    Posterior Wall 10/02/2023 0.98  0.6 - 1.1 cm Final    LV mass 10/02/2023 179.02  g Final    LV Mass Index 10/02/2023 101  g/m2 Final    ZLVIDS 10/02/2023 0.72   Final    ZLVIDD 10/02/2023 -0.02   Final    Systolic blood pressure 10/02/2023 149  mmHg Final    Diastolic blood pressure 10/02/2023 82  mmHg Final    HR at rest 10/02/2023 53  bpm Final    Exercise duration (min) 10/02/2023 10  minutes Final    Exercise duration (sec) 10/02/2023 30  seconds Final    Peak Systolic BP 10/02/2023 182  mmHg Final    Peak Diatolic BP 10/02/2023 95  mmHg Final    Peak HR 10/02/2023 144  bpm Final    Estimated METs 10/02/2023 13.0   Final    % Max HR Achieved  10/02/2023 99   Final    1 Minute Recovery HR 10/02/2023 116  bpm Final    RPP 10/02/2023 7,897   Final    Peak RPP 10/02/2023 26,208   Final   Lab Visit on 09/06/2023   Component Date Value Ref Range Status    Sodium 09/06/2023 144  136 - 145 mmol/L Final    Potassium 09/06/2023 4.5  3.5 - 5.1 mmol/L Final    Chloride 09/06/2023 110  95 - 110 mmol/L Final    CO2 09/06/2023 26  23 - 29 mmol/L Final    Glucose 09/06/2023 96  70 - 110 mg/dL Final    BUN 09/06/2023 9  8 - 23 mg/dL Final    Creatinine 09/06/2023 0.8  0.5 - 1.4 mg/dL Final    Calcium 09/06/2023 9.9  8.7 - 10.5 mg/dL Final    Total Protein 09/06/2023 7.5  6.0 - 8.4 g/dL Final    Albumin 09/06/2023 4.5  3.5 - 5.2 g/dL Final    Total Bilirubin 09/06/2023 1.5 (H)  0.1 - 1.0 mg/dL Final    Comment: For infants and newborns, interpretation of results should be based  on gestational age, weight and in agreement with clinical  observations.    Premature Infant recommended reference ranges:  Up to 24 hours.............<8.0 mg/dL  Up to 48 hours............<12.0 mg/dL  3-5 days..................<15.0 mg/dL  6-29 days.................<15.0 mg/dL      Alkaline Phosphatase 09/06/2023 61  55 - 135 U/L Final    AST 09/06/2023 26  10 - 40 U/L Final    ALT 09/06/2023 26  10 - 44 U/L Final    eGFR 09/06/2023 >60  >60 mL/min/1.73 m^2 Final    Anion Gap 09/06/2023 8  8 - 16 mmol/L Final    TSH 09/06/2023 1.662  0.400 - 4.000 uIU/mL Final    Cholesterol 09/06/2023 132  120 - 199 mg/dL Final    Comment: The National Cholesterol Education Program (NCEP) has set the  following guidelines (reference ranges) for Cholesterol:  Optimal.....................<200 mg/dL  Borderline High.............200-239 mg/dL  High........................> or = 240 mg/dL      Triglycerides 09/06/2023 64  30 - 150 mg/dL Final    Comment: The National Cholesterol Education Program (NCEP) has set the  following guidelines (reference values) for triglycerides:  Normal......................<150  mg/dL  Borderline High.............150-199 mg/dL  High........................200-499 mg/dL      HDL 09/06/2023 52  40 - 75 mg/dL Final    Comment: The National Cholesterol Education Program (NCEP) has set the  following guidelines (reference values) for HDL Cholesterol:  Low...............<40 mg/dL  Optimal...........>60 mg/dL      LDL Cholesterol 09/06/2023 67.2  63.0 - 159.0 mg/dL Final    Comment: The National Cholesterol Education Program (NCEP) has set the  following guidelines (reference values) for LDL Cholesterol:  Optimal.......................<130 mg/dL  Borderline High...............130-159 mg/dL  High..........................160-189 mg/dL  Very High.....................>190 mg/dL      HDL/Cholesterol Ratio 09/06/2023 39.4  20.0 - 50.0 % Final    Total Cholesterol/HDL Ratio 09/06/2023 2.5  2.0 - 5.0 Final    Non-HDL Cholesterol 09/06/2023 80  mg/dL Final    Comment: Risk category and Non-HDL cholesterol goals:  Coronary heart disease (CHD)or equivalent (10-year risk of CHD >20%):  Non-HDL cholesterol goal     <130 mg/dL  Two or more CHD risk factors and 10-year risk of CHD <= 20%:  Non-HDL cholesterol goal     <160 mg/dL  0 to 1 CHD risk factor:  Non-HDL cholesterol goal     <190 mg/dL      Hemoglobin A1C 09/06/2023 5.2  4.0 - 5.6 % Final    Comment: ADA Screening Guidelines:  5.7-6.4%  Consistent with prediabetes  >or=6.5%  Consistent with diabetes    High levels of fetal hemoglobin interfere with the HbA1C  assay. Heterozygous hemoglobin variants (HbS, HgC, etc)do  not significantly interfere with this assay.   However, presence of multiple variants may affect accuracy.      Estimated Avg Glucose 09/06/2023 103  68 - 131 mg/dL Final    WBC 09/06/2023 4.79  3.90 - 12.70 K/uL Final    RBC 09/06/2023 4.73  4.60 - 6.20 M/uL Final    Hemoglobin 09/06/2023 15.1  14.0 - 18.0 g/dL Final    Hematocrit 09/06/2023 45.2  40.0 - 54.0 % Final    MCV 09/06/2023 96  82 - 98 fL Final    MCH 09/06/2023 31.9 (H)  27.0 -  31.0 pg Final    MCHC 09/06/2023 33.4  32.0 - 36.0 g/dL Final    RDW 09/06/2023 12.3  11.5 - 14.5 % Final    Platelets 09/06/2023 217  150 - 450 K/uL Final    MPV 09/06/2023 10.0  9.2 - 12.9 fL Final    Immature Granulocytes 09/06/2023 0.6 (H)  0.0 - 0.5 % Final    Gran # (ANC) 09/06/2023 2.8  1.8 - 7.7 K/uL Final    Immature Grans (Abs) 09/06/2023 0.03  0.00 - 0.04 K/uL Final    Comment: Mild elevation in immature granulocytes is non specific and   can be seen in a variety of conditions including stress response,   acute inflammation, trauma and pregnancy. Correlation with other   laboratory and clinical findings is essential.      Lymph # 09/06/2023 1.3  1.0 - 4.8 K/uL Final    Mono # 09/06/2023 0.5  0.3 - 1.0 K/uL Final    Eos # 09/06/2023 0.1  0.0 - 0.5 K/uL Final    Baso # 09/06/2023 0.03  0.00 - 0.20 K/uL Final    nRBC 09/06/2023 0  0 /100 WBC Final    Gran % 09/06/2023 58.8  38.0 - 73.0 % Final    Lymph % 09/06/2023 27.3  18.0 - 48.0 % Final    Mono % 09/06/2023 9.8  4.0 - 15.0 % Final    Eosinophil % 09/06/2023 2.9  0.0 - 8.0 % Final    Basophil % 09/06/2023 0.6  0.0 - 1.9 % Final    Differential Method 09/06/2023 Automated   Final    Microalbumin, Urine 09/06/2023 38.0  ug/mL Final    Creatinine, Urine 09/06/2023 346.6  23.0 - 375.0 mg/dL Final    Microalb/Creat Ratio 09/06/2023 11.0  0.0 - 30.0 ug/mg Final    Vit D, 25-Hydroxy 09/06/2023 45  30 - 96 ng/mL Final    Comment: Vitamin D deficiency.........<10 ng/mL                              Vitamin D insufficiency......10-29 ng/mL       Vitamin D sufficiency........> or equal to 30 ng/mL  Vitamin D toxicity............>100 ng/mL      Vitamin B-12 09/06/2023 485  210 - 950 pg/mL Final   Office Visit on 08/28/2023   Component Date Value Ref Range Status    Cologuard Result 09/05/2023 Sample Could Not Be Processed 3  N/A Final    The stool exceeds the allowable weight (300 grams). The patient will be contacted to initiate a new sample collection.    [place]  Result 09/19/2023 Negative  Negative Final    Comment:   NEGATIVE TEST RESULT. A negative Cologuard result indicates a low likelihood that a colorectal cancer (CRC) or advanced adenoma (adenomatous polyps with more advanced pre-malignant features)  is present. The chance that a person with a negative Cologuard test has a colorectal cancer is less than 1 in 1500 (negative predictive value >99.9%) or has an  advanced adenoma is less than  5.3% (negative predictive value 94.7%). These data are based on a prospective cross-sectional study of 10,000 individuals at average risk for colorectal cancer who were screened with both Cologuard and colonoscopy. (Marianela CARPENTER et al, N Engl J Med 2014;370(14):3005-8886) The normal value (reference range) for this assay is negative.    COLOGUARD RE-SCREENING RECOMMENDATION: Periodic colorectal cancer screening is an important part of preventive healthcare for asymptomatic individuals at average risk for colorectal cancer.  Following a negative Cologuard result, the American Cancer Society and U.S.                            Multi-Society Task Force screening guidelines recommend a Cologuard re-screening interval of 3 years.   References: American Cancer Society Guideline for Colorectal Cancer Screening: https://www.cancer.org/cancer/colon-rectal-cancer/cbjizrqfk-fggzjckji-cisyady/acs-recommendations.html.; Vikas WILLIAMSON, Christian ORR, Avi CAMEJOK, Colorectal Cancer Screening: Recommendations for Physicians and Patients from the U.S. Multi-Society Task Force on Colorectal Cancer Screening , Am J Gastroenterology 2017; 112:3024-8669.    TEST DESCRIPTION: Composite algorithmic analysis of stool DNA-biomarkers with hemoglobin immunoassay.   Quantitative values of individual biomarkers are not reportable and are not associated with individual biomarker result reference ranges. Cologuard is intended for colorectal cancer screening of adults of either sex, 45 years or older, who are at average-risk  for colorectal cancer (CRC). Cologuard has been approved for use by the U.S. FDA. The performance of Cologuard was                            established in a cross sectional study of average-risk adults aged 50-84. Cologuard performance in patients ages 45 to 49 years was estimated by sub-group analysis of near-age groups. Colonoscopies performed for a positive result may find as the most clinically significant lesion: colorectal cancer [4.0%], advanced adenoma (including sessile serrated polyps greater than or equal to 1cm diameter) [20%] or non- advanced adenoma [31%]; or no colorectal neoplasia [45%]. These estimates are derived from a prospective cross-sectional screening study of 10,000 individuals at average risk for colorectal cancer who were screened with both Cologuard and colonoscopy. (Marianela Mcdonnell al, N Engl J Med 2014;370(14):4757-3457.) Cologuard may produce a false negative or false positive result (no colorectal cancer or precancerous polyp present at colonoscopy follow up). A negative Cologuard test result does not guarantee the absence of CRC or advanced adenoma (pre-cancer). The current Cologuard                            screening interval is every 3 years. (American Cancer Society and U.S. Multi-Society Task Force). Cologuard performance data in a 10,000 patient pivotal study using colonoscopy as the reference method can be accessed at the following location: www.[employer]/results. Additional description of the Cologuard test process, warnings and precautions can be found at www.[employer].com.         Imaging  Stress Echo Which stress agent will be used? Treadmill Exercise; Color Flow Doppler? No    Result Date: 10/2/2023    Left Ventricle: Normal wall motion. There is normal systolic function with a visually estimated ejection fraction of 55 - 60%.   Stress Protocol: The patient exercised for 10 minutes 30 seconds on a Adin protocol, corresponding to a functional capacity of 13.0 METS,  achieving a peak heart rate of 144 bpm, which is 99 % of the age predicted maximum heart rate. The patient experienced no angina during the test. Their exercise capacity was above average. The patient reported no symptoms during the stress test.   Baseline ECG: The Baseline ECG reveals sinus rhythm with non-specific intraventricular conduction delay.   Stress ECG: There are no ST segment deviation identified during the protocol. There are no arrhythmias during stress. There is normal blood pressure response with stress.   ECG Conclusion: The ECG portion of the study is negative for ischemia.   Post-stress Echo: The left ventricle systolic function is normal.   Post-stress      The study is normal and negative with no echocardiographic evidence of stress induced ischemia.       Assessment  1. Precordial pain  Noncardiac.  By history it is musculoskeletal  - Ambulatory referral/consult to Cardiology    2. Atherosclerosis  Risk factors are well controlled  - Ambulatory referral/consult to Cardiology    3. Abdominal aortic aneurysm (AAA) without rupture, unspecified part  Ectasia noted on abdominal ultrasound warrants re-evaluation in 5 years  - Ambulatory referral/consult to Cardiology      Plan and Discussion    We discussed that his chest discomfort by history does not sound cardiac in etiology.  Additionally, his stress test shows no evidence of ischemia.  His electrocardiogram has delayed precordial R-wave transition.  Hence based on electrocardiogram alone, an anterior scar can not be ruled out.  But there is no evidence of an anterior scar on the echocardiographic portion of a stress test.  Would continue with current risk factor management.    The 10-year ASCVD risk score (Katy WILLIAMSON, et al., 2019) is: 19.7%    Values used to calculate the score:      Age: 73 years      Sex: Male      Is Non- : No      Diabetic: No      Tobacco smoker: No      Systolic Blood Pressure: 116 mmHg      Is BP  treated: Yes      HDL Cholesterol: 52 mg/dL      Total Cholesterol: 132 mg/dL     Follow Up  Follow up if symptoms worsen or fail to improve.      Anatoliy Chen MD

## 2023-11-22 DIAGNOSIS — M17.0 BILATERAL PRIMARY OSTEOARTHRITIS OF KNEE: ICD-10-CM

## 2023-11-22 NOTE — TELEPHONE ENCOUNTER
Refill Routing Note   Medication(s) are not appropriate for processing by [facility] for the following reason(s):        Outside of protocol: non-delegated    [facility] action(s):  Route      Medication Therapy Plan:         Appointments  past 12m or future 3m with PCP    Date Provider   Last Visit   7/7/2023 Cristina Conde PA-C   Next Visit   Visit date not found Cristina Conde PA-C   ED visits in past 90 days: 0        Note composed:2:13 PM 11/22/2023

## 2023-11-23 RX ORDER — DICLOFENAC SODIUM 10 MG/G
2 GEL TOPICAL 4 TIMES DAILY PRN
Qty: 100 G | Refills: 1 | Status: SHIPPED | OUTPATIENT
Start: 2023-11-23

## 2023-12-03 DIAGNOSIS — I10 ESSENTIAL HYPERTENSION: ICD-10-CM

## 2023-12-03 NOTE — TELEPHONE ENCOUNTER
No care due was identified.  [facility] Embedded Care Due Messages. Reference number: [id number].   12/03/2023 9:00:38 AM CST

## 2023-12-04 RX ORDER — LOSARTAN POTASSIUM 25 MG/1
25 TABLET ORAL DAILY
Qty: 90 TABLET | Refills: 2 | Status: SHIPPED | OUTPATIENT
Start: 2023-12-04 | End: 2024-02-25 | Stop reason: SDUPTHER

## 2023-12-04 NOTE — TELEPHONE ENCOUNTER
Refill Decision Note   Zion Guillen  is requesting a refill authorization.  Brief Assessment and Rationale for Refill:  Approve     Medication Therapy Plan:         Comments:     Note composed:11:17 AM 12/04/2023

## 2024-01-11 DIAGNOSIS — I10 BENIGN HYPERTENSION: ICD-10-CM

## 2024-01-11 DIAGNOSIS — Z00.00 ENCOUNTER FOR MEDICARE ANNUAL WELLNESS EXAM: ICD-10-CM

## 2024-01-11 RX ORDER — AMLODIPINE BESYLATE 10 MG/1
10 TABLET ORAL DAILY
Qty: 90 TABLET | Refills: 2 | Status: SHIPPED | OUTPATIENT
Start: 2024-01-11

## 2024-01-11 NOTE — TELEPHONE ENCOUNTER
Refill Encounter    PCP Visits: Recent Visits  Date Type Provider Dept   08/28/23 Office Visit Shoshana Richards MD [facility]   01/17/23 Office Visit Michelle Van MD [facility]   Showing recent visits within past 360 days and meeting all other requirements  Future Appointments  Date Type Provider Dept   02/28/24 Appointment Shoshana Richards MD [facility]   Showing future appointments within next 720 days and meeting all other requirements     Last 3 Blood Pressure:   BP Readings from Last 3 Encounters:   10/23/23 116/64   10/02/23 (!) 149/82   08/28/23 127/77     Preferred Pharmacy:   [facility]/pharmacy #[id number] [place], [place] - [address]  [address] [address]  Phone: [phone] Fax: [phone]    Requested RX:  Requested Prescriptions     Pending Prescriptions Disp Refills    amLODIPine (NORVASC) 10 MG tablet 90 tablet 3     Sig: Take 1 tablet (10 mg total) by mouth once daily.      RX Route: Normal

## 2024-01-11 NOTE — TELEPHONE ENCOUNTER
Refill Decision Note   Zion Guillen  is requesting a refill authorization.  Brief Assessment and Rationale for Refill:  Approve     Medication Therapy Plan:         Comments:     Note composed:4:10 PM 01/11/2024

## 2024-01-11 NOTE — TELEPHONE ENCOUNTER
No care due was identified.  [facility] Embedded Care Due Messages. Reference number: [id number].   1/11/2024 9:40:25 AM CST

## 2024-01-20 ENCOUNTER — PATIENT MESSAGE (OUTPATIENT)
Dept: AUDIOLOGY | Facility: CLINIC | Age: 74
End: 2024-01-20
Payer: MEDICARE

## 2024-01-24 ENCOUNTER — CLINICAL SUPPORT (OUTPATIENT)
Dept: AUDIOLOGY | Facility: CLINIC | Age: 74
End: 2024-01-24
Payer: MEDICARE

## 2024-01-24 DIAGNOSIS — H90.3 SENSORINEURAL HEARING LOSS, BILATERAL: Primary | ICD-10-CM

## 2024-01-24 PROCEDURE — 92557 COMPREHENSIVE HEARING TEST: CPT | Mod: HCNC,S$GLB,, | Performed by: AUDIOLOGIST

## 2024-01-24 PROCEDURE — 99499 UNLISTED E&M SERVICE: CPT | Mod: HCNC,S$GLB,, | Performed by: AUDIOLOGIST

## 2024-01-24 PROCEDURE — 92567 TYMPANOMETRY: CPT | Mod: HCNC,S$GLB,, | Performed by: AUDIOLOGIST

## 2024-01-24 NOTE — PROGRESS NOTES
Hearing aid Follow-up:    Mr. Guillen was seen today for  hearing aid follow-up. He reported he has been doing well with his hearing aids, but feels they need to be turned up because his hearing seems like it has worsened. The hearing aids were cleaned and a listening check confirmed both devices to be in good working condition. Mr. Guillen's hearing aids were connected to the [employer] fitting software and programmed to his audiogram from today's visit. He reported improved sound quality. Mr. Guillen will return annually or sooner if needed.     HEARING AID INFO:  : [employer]  Model: Audeo M90-RT  Type: SRI  Color: Silver gray  Battery: Lithium-ion  Tube/ length & power: #2 M  Dome size & style: Phonak small open   R SN: [id number]  L SN:[id number]  Warranty expiration: 05/31/2020   L and D expiration: 05/31/2020      : [id number]

## 2024-01-24 NOTE — PROGRESS NOTES
Audiologic Evaluation 1/24/2024:       Zion Guillen, a 73 y.o. male, was seen today in the clinic for an audiologic evaluation for hearing loss.  Mr. Guillen reported he feels his hearing has worsened. He denied tinnitus, otalgia, and dizziness. Mr. Guillen wears binaural hearing aids and was also seen today for a hearing aid follow-up appointment.     Tympanometry revealed Type Ad tympanogram in the right ear and Type Ad tympanogram in the left ear. Audiogram results revealed normal hearing sloping to moderate sensorineural hearing loss in the right ear and normal hearing sloping to moderately severe sensorineural hearing loss in the left ear.  Speech reception thresholds were noted at 20 dB in the right ear and 20 dB in the left ear.  Speech discrimination scores were 100% in the right ear and 100% in the left ear.    Recommendations:  Otologic evaluation- due to abnormal appearing tympanic membranes and asymmetric sensorineural hearing loss  Daily use of hearing aids  Annual audiogram  Hearing protection when in noise

## 2024-02-25 DIAGNOSIS — I10 ESSENTIAL HYPERTENSION: ICD-10-CM

## 2024-02-25 NOTE — TELEPHONE ENCOUNTER
No care due was identified.  [facility] Embedded Care Due Messages. Reference number: [id number].   2/25/2024 10:21:08 AM CST

## 2024-02-27 RX ORDER — LOSARTAN POTASSIUM 25 MG/1
25 TABLET ORAL DAILY
Qty: 90 TABLET | Refills: 2 | Status: SHIPPED | OUTPATIENT
Start: 2024-02-27 | End: 2024-05-26 | Stop reason: SDUPTHER

## 2024-02-28 ENCOUNTER — OFFICE VISIT (OUTPATIENT)
Dept: INTERNAL MEDICINE | Facility: CLINIC | Age: 74
End: 2024-02-28
Payer: MEDICARE

## 2024-02-28 VITALS
OXYGEN SATURATION: 96 % | BODY MASS INDEX: 23.88 KG/M2 | DIASTOLIC BLOOD PRESSURE: 70 MMHG | HEART RATE: 58 BPM | WEIGHT: 148.56 LBS | HEIGHT: 66 IN | SYSTOLIC BLOOD PRESSURE: 128 MMHG

## 2024-02-28 DIAGNOSIS — Z85.828 HISTORY OF BASAL CELL CANCER: ICD-10-CM

## 2024-02-28 DIAGNOSIS — I77.811 AORTIC ECTASIA, ABDOMINAL: ICD-10-CM

## 2024-02-28 DIAGNOSIS — I10 ESSENTIAL HYPERTENSION: ICD-10-CM

## 2024-02-28 DIAGNOSIS — Z91.09 ENVIRONMENTAL ALLERGIES: ICD-10-CM

## 2024-02-28 DIAGNOSIS — Z00.00 HEALTH MAINTENANCE EXAMINATION: Primary | ICD-10-CM

## 2024-02-28 DIAGNOSIS — E78.2 MIXED HYPERLIPIDEMIA: ICD-10-CM

## 2024-02-28 DIAGNOSIS — R73.09 OTHER ABNORMAL GLUCOSE: ICD-10-CM

## 2024-02-28 DIAGNOSIS — N40.0 BENIGN PROSTATIC HYPERPLASIA WITHOUT LOWER URINARY TRACT SYMPTOMS: ICD-10-CM

## 2024-02-28 DIAGNOSIS — E55.9 VITAMIN D DEFICIENCY: ICD-10-CM

## 2024-02-28 PROCEDURE — 3078F DIAST BP <80 MM HG: CPT | Mod: HCNC,CPTII,S$GLB, | Performed by: STUDENT IN AN ORGANIZED HEALTH CARE EDUCATION/TRAINING PROGRAM

## 2024-02-28 PROCEDURE — 99999 PR PBB SHADOW E&M-EST. PATIENT-LVL IV: CPT | Mod: PBBFAC,HCNC,, | Performed by: STUDENT IN AN ORGANIZED HEALTH CARE EDUCATION/TRAINING PROGRAM

## 2024-02-28 PROCEDURE — 3074F SYST BP LT 130 MM HG: CPT | Mod: HCNC,CPTII,S$GLB, | Performed by: STUDENT IN AN ORGANIZED HEALTH CARE EDUCATION/TRAINING PROGRAM

## 2024-02-28 PROCEDURE — 1101F PT FALLS ASSESS-DOCD LE1/YR: CPT | Mod: HCNC,CPTII,S$GLB, | Performed by: STUDENT IN AN ORGANIZED HEALTH CARE EDUCATION/TRAINING PROGRAM

## 2024-02-28 PROCEDURE — 4010F ACE/ARB THERAPY RXD/TAKEN: CPT | Mod: HCNC,CPTII,S$GLB, | Performed by: STUDENT IN AN ORGANIZED HEALTH CARE EDUCATION/TRAINING PROGRAM

## 2024-02-28 PROCEDURE — 1126F AMNT PAIN NOTED NONE PRSNT: CPT | Mod: HCNC,CPTII,S$GLB, | Performed by: STUDENT IN AN ORGANIZED HEALTH CARE EDUCATION/TRAINING PROGRAM

## 2024-02-28 PROCEDURE — 3008F BODY MASS INDEX DOCD: CPT | Mod: HCNC,CPTII,S$GLB, | Performed by: STUDENT IN AN ORGANIZED HEALTH CARE EDUCATION/TRAINING PROGRAM

## 2024-02-28 PROCEDURE — 3288F FALL RISK ASSESSMENT DOCD: CPT | Mod: HCNC,CPTII,S$GLB, | Performed by: STUDENT IN AN ORGANIZED HEALTH CARE EDUCATION/TRAINING PROGRAM

## 2024-02-28 PROCEDURE — 99214 OFFICE O/P EST MOD 30 MIN: CPT | Mod: HCNC,S$GLB,, | Performed by: STUDENT IN AN ORGANIZED HEALTH CARE EDUCATION/TRAINING PROGRAM

## 2024-02-28 PROCEDURE — 1159F MED LIST DOCD IN RCRD: CPT | Mod: HCNC,CPTII,S$GLB, | Performed by: STUDENT IN AN ORGANIZED HEALTH CARE EDUCATION/TRAINING PROGRAM

## 2024-02-28 RX ORDER — ALBUTEROL SULFATE 90 UG/1
2 AEROSOL, METERED RESPIRATORY (INHALATION) EVERY 6 HOURS PRN
Qty: 18 G | Refills: 3 | Status: SHIPPED | OUTPATIENT
Start: 2024-02-28

## 2024-02-28 RX ORDER — FLUTICASONE PROPIONATE 50 MCG
1 SPRAY, SUSPENSION (ML) NASAL DAILY
COMMUNITY

## 2024-02-28 NOTE — PROGRESS NOTES
Subjective:       Patient ID: Zion Guillen is a 73 y.o. male.    Chief Complaint: Health maintenance examination [Z00.00]    Patient is established with me, here today for the following:    HTN, HLD, abdominal aortic ectasia, vitamin D deficiency, environmental allergies, history of basal cell    Health maintenance -   Cologuard negative SEP2023.   Denies family history of colorectal cancer.  Family history of cardiac disease.  Denies family history of prostate cancer.  UTD on Tdap, COVID, PPSV23, PCV13, shingles, RSV, influenza vaccinations.  Started smoking at age early teens, at most 1 PPD. Stopped smoking around 1993.  Occasional cigar use after, nothing currently.  Drinks alcohol daily, 1-2 drinks per sitting.  Denies drug use.  Completed HIV and hepatitis C screening.  UTD on diabetes screening.  Lab Results       Component                Value               Date                       HGBA1C                   5.2                 09/06/2023                   Walking 3 miles every morning  Swimming 3 days per week     HTN -   Currently prescribed losartan and amlodipine.  Patient endorses taking medication as directed.  Denies side effects or concerns while taking medication.  Lab Results       Component                Value               Date                       MICALBCREAT              11.0                09/06/2023            BP Readings from Last 5 Encounters:  10/23/23 : 116/64  10/02/23 : (!) 149/82  08/28/23 : 127/77  07/18/23 : 120/70  07/07/23 : 128/76     HLD -   Endorses taking atorvastatin as directed  Denies side effects or concerns while taking medication  Lab Results       Component                Value               Date                       CHOL                     132                 09/06/2023            Lab Results       Component                Value               Date                       TRIG                     64                  09/06/2023            Lab Results       Component   "              Value               Date                       LDLCALC                  67.2                09/06/2023            Lab Results       Component                Value               Date                       HDL                      52                  09/06/2023               Vitamin D deficiency -  Currently taking vitamin D3 2000 IU daily supplementation.  Lab Results       Component                Value               Date                       [id number]             45                  09/06/2023                 [id number]             49                  01/24/2022                 [id number]             41                  12/15/2020                       History of TURP previously   Endorses waking up about once nightly to urinate  Feels emptying bladder completely, no hesitancy  Some dribbling at end of urination    Endorses has significant allergy attack starting Friday  Lasted over the weekend  Took allergy tablets with antihistamine and resolved by Monday  Using azelastine with good effect  Taking Zyrtec nightly for environmental allergies with good effect  Requests refills of albuterol inhaler     History of basal cell   Following with Dr. Huang routinely for dermatology.  Last appointment FEB2024     Abdominal aorta u/s AUG2023 showed "Ectasia of the abdominal aorta with no evidence for aneurysm."  Will repeat u/s in AUG2026     Followed with Dr. Chen for cardiology  North his CP unlikely cardiac in origin  Denies CP since that time      Intertrigo resolved with treatment           Review of Systems   Constitutional:  Negative for appetite change, chills, fatigue, fever and unexpected weight change.   Respiratory:  Negative for cough and shortness of breath.    Cardiovascular:  Negative for chest pain, palpitations and leg swelling.   Gastrointestinal:  Negative for abdominal pain, constipation, diarrhea, nausea and vomiting.   Genitourinary:  Negative for difficulty urinating and " "frequency.   Skin:  Negative for rash.   Neurological:  Negative for dizziness, syncope, weakness and headaches.         Current Outpatient Medications   Medication Instructions    albuterol (VENTOLIN HFA) 90 mcg/actuation inhaler 2 puffs, Inhalation, Every 6 hours PRN, Rescue    amLODIPine (NORVASC) 10 mg, Oral, Daily    atorvastatin (LIPITOR) 40 mg, Oral    azelastine (ASTELIN) 274 mcg, Nasal, 2 times daily PRN    cetirizine (ZYRTEC) 10 mg, Oral, Daily    cholecalciferol, vitamin D3, (VITAMIN D3) 50 mcg (2,000 unit) Cap No dose, route, or frequency recorded.    clotrimazole-betamethasone 1-0.05% (LOTRISONE) cream Topical (Top), 2 times daily    diclofenac sodium (VOLTAREN) 2 g, Topical (Top), 4 times daily PRN    glucosamine-chondroitin 500-400 mg tablet 2 tablets, Oral, Daily    losartan (COZAAR) 25 mg, Oral, Daily     Objective:      Vitals:    02/28/24 0934 02/28/24 1009   BP: (!) 144/80 128/70   Pulse: (!) 58    SpO2: 96%    Weight: 67.4 kg (148 lb 9.4 oz)    Height: 5' 6" (1.676 m)    PainSc: 0-No pain      Body mass index is 23.98 kg/m².    Physical Exam  Vitals reviewed.   Constitutional:       General: He is not in acute distress.     Appearance: Normal appearance. He is not ill-appearing or diaphoretic.   HENT:      Head: Normocephalic and atraumatic.      Right Ear: Tympanic membrane, ear canal and external ear normal. There is no impacted cerumen.      Left Ear: Tympanic membrane, ear canal and external ear normal. There is no impacted cerumen.      Nose: Nose normal. No rhinorrhea.      Mouth/Throat:      Mouth: Mucous membranes are moist.      Pharynx: Oropharynx is clear. No oropharyngeal exudate or posterior oropharyngeal erythema.   Eyes:      General: No scleral icterus.        Right eye: No discharge.         Left eye: No discharge.      Conjunctiva/sclera: Conjunctivae normal.   Neck:      Thyroid: No thyromegaly or thyroid tenderness.      Trachea: Trachea normal.   Cardiovascular:      Rate " and Rhythm: Normal rate and regular rhythm.      Heart sounds: Normal heart sounds. No murmur heard.     No friction rub. No gallop.   Pulmonary:      Effort: Pulmonary effort is normal. No respiratory distress.      Breath sounds: Normal breath sounds. No stridor. No wheezing, rhonchi or rales.   Abdominal:      General: Bowel sounds are normal. There is no distension.      Palpations: Abdomen is soft.      Tenderness: There is no abdominal tenderness. There is no guarding or rebound.   Musculoskeletal:         General: No swelling or deformity.      Cervical back: Neck supple.   Lymphadenopathy:      Head:      Right side of head: No submandibular or posterior auricular adenopathy.      Left side of head: No submandibular or posterior auricular adenopathy.      Cervical: No cervical adenopathy.      Right cervical: No superficial, deep or posterior cervical adenopathy.     Left cervical: No superficial, deep or posterior cervical adenopathy.      Upper Body:      Right upper body: No supraclavicular adenopathy.      Left upper body: No supraclavicular adenopathy.   Skin:     General: Skin is warm and dry.   Neurological:      General: No focal deficit present.      Mental Status: He is alert. Mental status is at baseline.      Gait: Gait normal.   Psychiatric:         Mood and Affect: Mood normal.         Behavior: Behavior normal.         Assessment:       1. Health maintenance examination    2. Essential hypertension    3. Mixed hyperlipidemia    4. Vitamin D deficiency    5. Benign prostatic hyperplasia without lower urinary tract symptoms    6. Environmental allergies    7. Environmental allergies    8. History of basal cell cancer    9. Aortic ectasia, abdominal    10. Other abnormal glucose        Plan:       Essential hypertension  Continue supplementation.  RTC in 6 months for follow up.  -     Comprehensive Metabolic Panel; Future  -     TSH; Future  -     CBC Auto Differential; Future  -      Microalbumin/Creatinine Ratio, Urine; Future    Mixed hyperlipidemia  Continue supplementation.  RTC in 6 months for follow up.  -     Lipid Panel; Future    Vitamin D deficiency  Continue supplementation.  RTC in 6 months for follow up.  -     Vitamin D; Future    Benign prostatic hyperplasia without lower urinary tract symptoms  RTC in 6 months for follow up.    Environmental allergies  Continue current medications.  RTC in 6 months for follow up.  -     albuterol (VENTOLIN HFA) 90 mcg/actuation inhaler; Inhale 2 puffs into the lungs every 6 (six) hours as needed for Wheezing. Rescue    History of basal cell cancer  Continue evaluation and management per dermatologist.    Aortic ectasia, abdominal  Repeat u/s AUG2026    Health maintenance examination  Reviewed and discussed age appropriate screenings and immunizations.  -     Comprehensive Metabolic Panel; Future  -     TSH; Future  -     Lipid Panel; Future  -     Hemoglobin A1C; Future  -     CBC Auto Differential; Future  -     Microalbumin/Creatinine Ratio, Urine; Future  -     Vitamin D; Future    Other abnormal glucose  -     Hemoglobin A1C; Future      Shoshana Richards MD  2/28/2024

## 2024-03-07 ENCOUNTER — OFFICE VISIT (OUTPATIENT)
Dept: OTOLARYNGOLOGY | Facility: CLINIC | Age: 74
End: 2024-03-07
Payer: MEDICARE

## 2024-03-07 DIAGNOSIS — H90.3 ASYMMETRIC SNHL (SENSORINEURAL HEARING LOSS): Primary | ICD-10-CM

## 2024-03-07 DIAGNOSIS — H90.3 SENSORINEURAL HEARING LOSS (SNHL) OF BOTH EARS: ICD-10-CM

## 2024-03-07 DIAGNOSIS — Z97.4 WEARS HEARING AID IN BOTH EARS: ICD-10-CM

## 2024-03-07 PROCEDURE — 4010F ACE/ARB THERAPY RXD/TAKEN: CPT | Mod: HCNC,CPTII,S$GLB, | Performed by: NURSE PRACTITIONER

## 2024-03-07 PROCEDURE — 99213 OFFICE O/P EST LOW 20 MIN: CPT | Mod: HCNC,S$GLB,, | Performed by: NURSE PRACTITIONER

## 2024-03-07 NOTE — PROGRESS NOTES
Subjective:   Zion Guillen is a 73 y.o. male who was self-referred for  an abnormal audiogram . He has a past medical history of Arthritis, BCC (basal cell carcinoma of skin) (7/1/2022), BPH (benign prostatic hyperplasia), Hyperlipidemia, and Hypertension. He reports that overall he has no ear complaints. He has been wearing hearing aids for 3 ears and has been satisfied with their benefits. He denies any hearing difference between the ears. He denies any otalgia, otorrhea, ear fullness/pressure, tinnitus or vertigo. He will use Flonase, Astelin and an oral antihistamine as needed for allergies.     Past Medical History  He has a past medical history of Arthritis, BCC (basal cell carcinoma of skin), BPH (benign prostatic hyperplasia), Hyperlipidemia, and Hypertension.    Past Surgical History  He has a past surgical history that includes Hernia repair (11/29/2017); Prostate surgery (10/25/2018); Colonoscopy (2006); Knee arthroscopy (Left, 2011); Hernia repair (1962); Cystourethroscopy (05/15/2019); and Transurethral resection of prostate (10/25/2018).    Family History  His family history includes Arthritis in his mother; Cancer in his mother; Coronary artery disease in his father; Heart attack in his father; Hypertension in his father; Uterine cancer in his mother.    Social History  He reports that he quit smoking about 34 years ago. His smoking use included cigarettes. He started smoking about 64 years ago. He has a 30.0 pack-year smoking history. He has never used smokeless tobacco. He reports current alcohol use of about 6.0 standard drinks of alcohol per week. He reports that he does not use drugs.    Allergies  He has No Known Allergies.    Medications  He has a current medication list which includes the following prescription(s): albuterol, amlodipine, atorvastatin, azelastine, cetirizine, cholecalciferol (vitamin d3), clotrimazole-betamethasone 1-0.05%, diclofenac sodium, fluticasone propionate,  glucosamine-chondroitin, and losartan.  Review of Systems     Constitutional: Negative for appetite change, chills, fatigue, fever and unexpected weight loss.      HENT: Positive for hearing loss, sinus infection and sinus pressure.      Eyes:  Positive for eye itching.     Respiratory:  Positive for sleep apnea and snoring.      Cardiovascular:  Negative for chest pain, foot swelling, irregular heartbeat and swollen veins.     Gastrointestinal:  Negative for abdominal pain, acid reflux, constipation, diarrhea, heartburn and vomiting.     Genitourinary: Negative for difficulty urinating, sexual problems and frequent urination.     Musc: Positive for back pain.     Skin: Negative for rash.     Allergy: Positive for seasonal allergies.     Endocrine: Negative for cold intolerance and heat intolerance.      Neurological: Negative for dizziness, headaches, light-headedness, seizures and tremors.      Hematologic: Negative for bruises/bleeds easily and swollen glands.      Psychiatric: Negative for decreased concentration, depression, nervous/anxious and sleep disturbance.          Objective:     Constitutional:   He is oriented to person, place, and time. He appears well-developed and well-nourished. He appears alert. He is cooperative.  Non-toxic appearance. He does not have a sickly appearance. He does not appear ill. Normal speech.      Head:  Normocephalic and atraumatic. Not macrocephalic and not microcephalic. Head is without abrasion, without right periorbital erythema, without left periorbital erythema and without TMJ tenderness.     Ears:    Right Ear: No drainage, swelling or tenderness. No mastoid tenderness. Tympanic membrane is scarred. Tympanic membrane is not perforated, not erythematous, not retracted and not bulging. No middle ear effusion.   Left Ear: No drainage, swelling or tenderness. No mastoid tenderness. Tympanic membrane is not scarred, not perforated, not erythematous, not retracted and not  bulging.  No middle ear effusion.   Ears:      Pulmonary/Chest:   Effort normal.     Psychiatric:   He has a normal mood and affect. His speech is normal and behavior is normal.     Neurological:   He is alert and oriented to person, place, and time.     Procedure  None    Audiogram        I independently reviewed the tracings of the complete audiometric evaluation. I reviewed the audiogram with the patient as well. Pertinent findings include normal hearing sloping to moderate sensorineural hearing loss in the right ear and normal hearing sloping to moderately severe sensorineural hearing loss in the left ear.   Assessment:     1. Asymmetric SNHL (sensorineural hearing loss)    2. Sensorineural hearing loss (SNHL) of both ears    3. Wears hearing aid in both ears      Plan:   Diagnoses and all orders for this visit:    Asymmetric SNHL (sensorineural hearing loss)  We discussed the potential causes of asymmetrical hearing loss including: normal aging (presbycusis), noise-induced hearing loss, genetic causes, medications/drugs, microvascular changes, injury to the head or ear and structural problems of the inner ear. An MRI was recommended to rule out a definite cause and help clarify whether there is an underlying structural abnormality. At this time he would prefer to monitor asymmetry to serial audiograms.     Sensorineural hearing loss (SNHL) of both ears  Wears hearing aid in both ears  Follow-up as needed with audiology.     Annual audiograms with ear check.

## 2024-04-15 DIAGNOSIS — I10 BENIGN HYPERTENSION: ICD-10-CM

## 2024-04-15 DIAGNOSIS — M17.0 BILATERAL PRIMARY OSTEOARTHRITIS OF KNEE: ICD-10-CM

## 2024-04-15 RX ORDER — DICLOFENAC SODIUM 10 MG/G
2 GEL TOPICAL 4 TIMES DAILY PRN
Qty: 100 G | Refills: 3 | Status: SHIPPED | OUTPATIENT
Start: 2024-04-15

## 2024-04-15 RX ORDER — AMLODIPINE BESYLATE 10 MG/1
10 TABLET ORAL DAILY
Qty: 90 TABLET | Refills: 3 | Status: SHIPPED | OUTPATIENT
Start: 2024-04-15

## 2024-04-15 NOTE — TELEPHONE ENCOUNTER
No care due was identified.  [facility] Embedded Care Due Messages. Reference number: [id number].   4/15/2024 8:46:59 AM CDT

## 2024-04-24 DIAGNOSIS — I70.0 AORTIC ATHEROSCLEROSIS: ICD-10-CM

## 2024-04-24 DIAGNOSIS — Z91.09 ENVIRONMENTAL ALLERGIES: ICD-10-CM

## 2024-04-24 RX ORDER — AZELASTINE 1 MG/ML
2 SPRAY, METERED NASAL 2 TIMES DAILY PRN
Qty: 90 ML | Refills: 3 | Status: SHIPPED | OUTPATIENT
Start: 2024-04-24 | End: 2025-04-24

## 2024-04-24 RX ORDER — ATORVASTATIN CALCIUM 40 MG/1
40 TABLET, FILM COATED ORAL DAILY
Qty: 90 TABLET | Refills: 3 | Status: SHIPPED | OUTPATIENT
Start: 2024-04-24 | End: 2024-05-05 | Stop reason: SDUPTHER

## 2024-04-24 NOTE — TELEPHONE ENCOUNTER
No care due was identified.  [facility] Embedded Care Due Messages. Reference number: [id number].   4/24/2024 9:00:35 AM CDT

## 2024-05-05 DIAGNOSIS — I70.0 AORTIC ATHEROSCLEROSIS: ICD-10-CM

## 2024-05-05 NOTE — TELEPHONE ENCOUNTER
No care due was identified.  [facility] Embedded Care Due Messages. Reference number: [id number].   5/05/2024 12:31:17 PM CDT

## 2024-05-06 RX ORDER — ATORVASTATIN CALCIUM 40 MG/1
40 TABLET, FILM COATED ORAL DAILY
Qty: 90 TABLET | Refills: 3 | Status: SHIPPED | OUTPATIENT
Start: 2024-05-06

## 2024-05-08 ENCOUNTER — OFFICE VISIT (OUTPATIENT)
Dept: OPTOMETRY | Facility: CLINIC | Age: 74
End: 2024-05-08
Payer: MEDICARE

## 2024-05-08 DIAGNOSIS — H52.203 MYOPIA WITH ASTIGMATISM AND PRESBYOPIA, BILATERAL: ICD-10-CM

## 2024-05-08 DIAGNOSIS — H53.10 SUBJECTIVE VISUAL DISTURBANCE: Primary | ICD-10-CM

## 2024-05-08 DIAGNOSIS — H52.4 MYOPIA WITH ASTIGMATISM AND PRESBYOPIA, BILATERAL: ICD-10-CM

## 2024-05-08 DIAGNOSIS — H52.13 MYOPIA WITH ASTIGMATISM AND PRESBYOPIA, BILATERAL: ICD-10-CM

## 2024-05-08 DIAGNOSIS — H25.13 NUCLEAR SCLEROSIS OF BOTH EYES: ICD-10-CM

## 2024-05-08 PROCEDURE — 1101F PT FALLS ASSESS-DOCD LE1/YR: CPT | Mod: HCNC,CPTII,S$GLB, | Performed by: OPTOMETRIST

## 2024-05-08 PROCEDURE — 4010F ACE/ARB THERAPY RXD/TAKEN: CPT | Mod: HCNC,CPTII,S$GLB, | Performed by: OPTOMETRIST

## 2024-05-08 PROCEDURE — 1126F AMNT PAIN NOTED NONE PRSNT: CPT | Mod: HCNC,CPTII,S$GLB, | Performed by: OPTOMETRIST

## 2024-05-08 PROCEDURE — 1159F MED LIST DOCD IN RCRD: CPT | Mod: HCNC,CPTII,S$GLB, | Performed by: OPTOMETRIST

## 2024-05-08 PROCEDURE — 3288F FALL RISK ASSESSMENT DOCD: CPT | Mod: HCNC,CPTII,S$GLB, | Performed by: OPTOMETRIST

## 2024-05-08 PROCEDURE — 99999 PR PBB SHADOW E&M-EST. PATIENT-LVL III: CPT | Mod: PBBFAC,HCNC,, | Performed by: OPTOMETRIST

## 2024-05-08 PROCEDURE — 92014 COMPRE OPH EXAM EST PT 1/>: CPT | Mod: HCNC,S$GLB,, | Performed by: OPTOMETRIST

## 2024-05-08 NOTE — PROGRESS NOTES
HPI    VIIPN: 01/30/2023 Dr Navas  Chief complaint (CC): 74 yr old in today for Annual Eye Exam  Glasses? Yes, Progressives 2 yrs old   Contacts? No  H/o eye surgery, injections or laser: No  H/o eye injury: No  Known eye conditions? No  Family h/o eye conditions? No  Eye gtts? OTC Allergy Drops      (-) Flashes (-)  Floaters (-) Mucous   (-)  Tearing (-) Itching (-) Burning   (-) Headaches (-) Eye Pain/discomfort (-) Irritation   (-)  Redness (-) Double vision (-) Blurry vision    Diabetic? No  A1c? Lab Results       Component                Value               Date                       HGBA1C                   5.2                 09/06/2023                  Last edited by Courtney Fink on 5/8/2024  1:18 PM.            Assessment /Plan     For exam results, see Encounter Report.    Subjective visual disturbance    Nuclear sclerosis of both eyes    Myopia with astigmatism and presbyopia, bilateral      MONITOR. ED PT ON ALL EXAM FINDINGS  HOLD SPECS   MILD NS OU; EARLY PSC OS; VISUALLY SIGNIFICANT OU; MONITOR; REFER TO CAT CONSULT   RTC 1 YR//PRN FOR REE/DFE

## 2024-05-26 DIAGNOSIS — I10 ESSENTIAL HYPERTENSION: ICD-10-CM

## 2024-05-27 RX ORDER — LOSARTAN POTASSIUM 25 MG/1
25 TABLET ORAL DAILY
Qty: 90 TABLET | Refills: 3 | Status: SHIPPED | OUTPATIENT
Start: 2024-05-27

## 2024-05-27 NOTE — TELEPHONE ENCOUNTER
No care due was identified.  [facility] Embedded Care Due Messages. Reference number: [id number].   5/26/2024 7:04:52 PM CDT

## 2024-07-14 DIAGNOSIS — I10 BENIGN HYPERTENSION: ICD-10-CM

## 2024-07-14 NOTE — TELEPHONE ENCOUNTER
Care Due:                  Date            Visit Type   Department     Provider  --------------------------------------------------------------------------------                                EP -                              PRIMARY      [facility] INTERNAL  Last Visit: 02-      CARE ([facility])   MCKENNA Rihcards                              EP -                              PRIMARY      [facility] INTERNAL  Next Visit: 08-      CARE ([facility])   MCKENNA Richards                                                            Last  Test          Frequency    Reason                     Performed    Due Date  --------------------------------------------------------------------------------    CBC.........  12 months..  diclofenac...............  09- 08-    CMP.........  12 months..  atorvastatin, diclofenac,   09- 08-                             losartan.................    Lipid Panel.  12 months..  atorvastatin.............  09- 08-    Health [facility] Embedded Care Due Messages. Reference number: [id number].   7/14/2024 11:11:58 AM CDT

## 2024-07-15 RX ORDER — AMLODIPINE BESYLATE 10 MG/1
10 TABLET ORAL DAILY
Qty: 90 TABLET | Refills: 3 | Status: SHIPPED | OUTPATIENT
Start: 2024-07-15

## 2024-07-18 DIAGNOSIS — M17.0 BILATERAL PRIMARY OSTEOARTHRITIS OF KNEE: ICD-10-CM

## 2024-07-18 RX ORDER — DICLOFENAC SODIUM 10 MG/G
2 GEL TOPICAL 4 TIMES DAILY PRN
Qty: 100 G | Refills: 3 | Status: SHIPPED | OUTPATIENT
Start: 2024-07-18

## 2024-07-18 NOTE — TELEPHONE ENCOUNTER
No care due was identified.  [facility] Embedded Care Due Messages. Reference number: [id number].   7/18/2024 11:41:01 AM CDT

## 2024-07-22 DIAGNOSIS — I70.0 AORTIC ATHEROSCLEROSIS: ICD-10-CM

## 2024-07-22 NOTE — TELEPHONE ENCOUNTER
No care due was identified.  [facility] Embedded Care Due Messages. Reference number: [id number].   7/22/2024 3:03:52 PM CDT

## 2024-07-23 RX ORDER — ATORVASTATIN CALCIUM 40 MG/1
40 TABLET, FILM COATED ORAL DAILY
Qty: 90 TABLET | Refills: 3 | Status: SHIPPED | OUTPATIENT
Start: 2024-07-23

## 2024-08-21 ENCOUNTER — OFFICE VISIT (OUTPATIENT)
Dept: OPHTHALMOLOGY | Facility: CLINIC | Age: 74
End: 2024-08-21
Payer: MEDICARE

## 2024-08-21 DIAGNOSIS — H25.13 NUCLEAR SCLEROSIS, BILATERAL: Primary | ICD-10-CM

## 2024-08-21 PROCEDURE — 3288F FALL RISK ASSESSMENT DOCD: CPT | Mod: HCNC,CPTII,S$GLB, | Performed by: OPHTHALMOLOGY

## 2024-08-21 PROCEDURE — 1126F AMNT PAIN NOTED NONE PRSNT: CPT | Mod: HCNC,CPTII,S$GLB, | Performed by: OPHTHALMOLOGY

## 2024-08-21 PROCEDURE — 1101F PT FALLS ASSESS-DOCD LE1/YR: CPT | Mod: HCNC,CPTII,S$GLB, | Performed by: OPHTHALMOLOGY

## 2024-08-21 PROCEDURE — 92004 COMPRE OPH EXAM NEW PT 1/>: CPT | Mod: HCNC,S$GLB,, | Performed by: OPHTHALMOLOGY

## 2024-08-21 PROCEDURE — 4010F ACE/ARB THERAPY RXD/TAKEN: CPT | Mod: HCNC,CPTII,S$GLB, | Performed by: OPHTHALMOLOGY

## 2024-08-21 PROCEDURE — 99999 PR PBB SHADOW E&M-EST. PATIENT-LVL III: CPT | Mod: PBBFAC,HCNC,, | Performed by: OPHTHALMOLOGY

## 2024-08-21 PROCEDURE — 1160F RVW MEDS BY RX/DR IN RCRD: CPT | Mod: HCNC,CPTII,S$GLB, | Performed by: OPHTHALMOLOGY

## 2024-08-21 PROCEDURE — 1159F MED LIST DOCD IN RCRD: CPT | Mod: HCNC,CPTII,S$GLB, | Performed by: OPHTHALMOLOGY

## 2024-08-21 NOTE — PROGRESS NOTES
HPI    Patient is here today for a cataract evaluation. Last seen on 05/08/2024   with Dr. Rayo. States vision has decreased at both distance and near. It   is difficult to read smaller prints. Occasional floaters.     Eye Meds: OTC Allergy Drops PRN OU  Past Ocular Sx: None  Last edited by Valentine Hauser on 8/21/2024  9:17 AM.            Assessment /Plan     For exam results, see Encounter Report.    Nuclear sclerosis, bilateral      Incipient cataract: Patient does reports mild visual decline, but not sufficient to affect activities of daily living. I recommend monitoring visual status and follow up when visual symptoms worsen.

## 2024-08-22 ENCOUNTER — LAB VISIT (OUTPATIENT)
Dept: LAB | Facility: OTHER | Age: 74
End: 2024-08-22
Attending: STUDENT IN AN ORGANIZED HEALTH CARE EDUCATION/TRAINING PROGRAM
Payer: MEDICARE

## 2024-08-22 DIAGNOSIS — E55.9 VITAMIN D DEFICIENCY: ICD-10-CM

## 2024-08-22 DIAGNOSIS — Z00.00 HEALTH MAINTENANCE EXAMINATION: ICD-10-CM

## 2024-08-22 DIAGNOSIS — I10 ESSENTIAL HYPERTENSION: ICD-10-CM

## 2024-08-22 DIAGNOSIS — R73.09 OTHER ABNORMAL GLUCOSE: ICD-10-CM

## 2024-08-22 DIAGNOSIS — E78.2 MIXED HYPERLIPIDEMIA: ICD-10-CM

## 2024-08-22 LAB
25(OH)D3+25(OH)D2 SERPL-MCNC: 43 NG/ML (ref 30–96)
ALBUMIN SERPL BCP-MCNC: 4.2 G/DL (ref 3.5–5.2)
ALBUMIN/CREAT UR: 10.5 UG/MG (ref 0–30)
ALP SERPL-CCNC: 61 U/L (ref 55–135)
ALT SERPL W/O P-5'-P-CCNC: 25 U/L (ref 10–44)
ANION GAP SERPL CALC-SCNC: 7 MMOL/L (ref 8–16)
AST SERPL-CCNC: 26 U/L (ref 10–40)
BASOPHILS # BLD AUTO: 0.03 K/UL (ref 0–0.2)
BASOPHILS NFR BLD: 0.6 % (ref 0–1.9)
BILIRUB SERPL-MCNC: 0.9 MG/DL (ref 0.1–1)
BUN SERPL-MCNC: 10 MG/DL (ref 8–23)
CALCIUM SERPL-MCNC: 9.3 MG/DL (ref 8.7–10.5)
CHLORIDE SERPL-SCNC: 112 MMOL/L (ref 95–110)
CHOLEST SERPL-MCNC: 128 MG/DL (ref 120–199)
CHOLEST/HDLC SERPL: 2.6 {RATIO} (ref 2–5)
CO2 SERPL-SCNC: 24 MMOL/L (ref 23–29)
CREAT SERPL-MCNC: 0.8 MG/DL (ref 0.5–1.4)
CREAT UR-MCNC: 190.2 MG/DL (ref 23–375)
DIFFERENTIAL METHOD BLD: ABNORMAL
EOSINOPHIL # BLD AUTO: 0.3 K/UL (ref 0–0.5)
EOSINOPHIL NFR BLD: 5.3 % (ref 0–8)
ERYTHROCYTE [DISTWIDTH] IN BLOOD BY AUTOMATED COUNT: 12.5 % (ref 11.5–14.5)
EST. GFR  (NO RACE VARIABLE): >60 ML/MIN/1.73 M^2
ESTIMATED AVG GLUCOSE: 103 MG/DL (ref 68–131)
GLUCOSE SERPL-MCNC: 91 MG/DL (ref 70–110)
HBA1C MFR BLD: 5.2 % (ref 4–5.6)
HCT VFR BLD AUTO: 45.4 % (ref 40–54)
HDLC SERPL-MCNC: 49 MG/DL (ref 40–75)
HDLC SERPL: 38.3 % (ref 20–50)
HGB BLD-MCNC: 15 G/DL (ref 14–18)
IMM GRANULOCYTES # BLD AUTO: 0.02 K/UL (ref 0–0.04)
IMM GRANULOCYTES NFR BLD AUTO: 0.4 % (ref 0–0.5)
LDLC SERPL CALC-MCNC: 65.4 MG/DL (ref 63–159)
LYMPHOCYTES # BLD AUTO: 1.5 K/UL (ref 1–4.8)
LYMPHOCYTES NFR BLD: 32.1 % (ref 18–48)
MCH RBC QN AUTO: 31.6 PG (ref 27–31)
MCHC RBC AUTO-ENTMCNC: 33 G/DL (ref 32–36)
MCV RBC AUTO: 96 FL (ref 82–98)
MICROALBUMIN UR DL<=1MG/L-MCNC: 20 UG/ML
MONOCYTES # BLD AUTO: 0.4 K/UL (ref 0.3–1)
MONOCYTES NFR BLD: 9.4 % (ref 4–15)
NEUTROPHILS # BLD AUTO: 2.4 K/UL (ref 1.8–7.7)
NEUTROPHILS NFR BLD: 52.2 % (ref 38–73)
NONHDLC SERPL-MCNC: 79 MG/DL
NRBC BLD-RTO: 0 /100 WBC
PLATELET # BLD AUTO: 197 K/UL (ref 150–450)
PMV BLD AUTO: 10.1 FL (ref 9.2–12.9)
POTASSIUM SERPL-SCNC: 4 MMOL/L (ref 3.5–5.1)
PROT SERPL-MCNC: 6.9 G/DL (ref 6–8.4)
RBC # BLD AUTO: 4.74 M/UL (ref 4.6–6.2)
SODIUM SERPL-SCNC: 143 MMOL/L (ref 136–145)
TRIGL SERPL-MCNC: 68 MG/DL (ref 30–150)
TSH SERPL DL<=0.005 MIU/L-ACNC: 1.69 UIU/ML (ref 0.4–4)
WBC # BLD AUTO: 4.68 K/UL (ref 3.9–12.7)

## 2024-08-22 PROCEDURE — 80061 LIPID PANEL: CPT | Mod: HCNC | Performed by: STUDENT IN AN ORGANIZED HEALTH CARE EDUCATION/TRAINING PROGRAM

## 2024-08-22 PROCEDURE — 83036 HEMOGLOBIN GLYCOSYLATED A1C: CPT | Mod: HCNC | Performed by: STUDENT IN AN ORGANIZED HEALTH CARE EDUCATION/TRAINING PROGRAM

## 2024-08-22 PROCEDURE — 82570 ASSAY OF URINE CREATININE: CPT | Mod: HCNC | Performed by: STUDENT IN AN ORGANIZED HEALTH CARE EDUCATION/TRAINING PROGRAM

## 2024-08-22 PROCEDURE — 82306 VITAMIN D 25 HYDROXY: CPT | Mod: HCNC | Performed by: STUDENT IN AN ORGANIZED HEALTH CARE EDUCATION/TRAINING PROGRAM

## 2024-08-22 PROCEDURE — 84443 ASSAY THYROID STIM HORMONE: CPT | Mod: HCNC | Performed by: STUDENT IN AN ORGANIZED HEALTH CARE EDUCATION/TRAINING PROGRAM

## 2024-08-22 PROCEDURE — 82043 UR ALBUMIN QUANTITATIVE: CPT | Mod: HCNC | Performed by: STUDENT IN AN ORGANIZED HEALTH CARE EDUCATION/TRAINING PROGRAM

## 2024-08-22 PROCEDURE — 85025 COMPLETE CBC W/AUTO DIFF WBC: CPT | Mod: HCNC | Performed by: STUDENT IN AN ORGANIZED HEALTH CARE EDUCATION/TRAINING PROGRAM

## 2024-08-22 PROCEDURE — 80053 COMPREHEN METABOLIC PANEL: CPT | Mod: HCNC | Performed by: STUDENT IN AN ORGANIZED HEALTH CARE EDUCATION/TRAINING PROGRAM

## 2024-08-27 ENCOUNTER — OFFICE VISIT (OUTPATIENT)
Dept: INTERNAL MEDICINE | Facility: CLINIC | Age: 74
End: 2024-08-27
Payer: MEDICARE

## 2024-08-27 ENCOUNTER — HOSPITAL ENCOUNTER (OUTPATIENT)
Dept: RADIOLOGY | Facility: OTHER | Age: 74
Discharge: HOME | End: 2024-08-27
Attending: STUDENT IN AN ORGANIZED HEALTH CARE EDUCATION/TRAINING PROGRAM
Payer: MEDICARE

## 2024-08-27 ENCOUNTER — PATIENT MESSAGE (OUTPATIENT)
Dept: INTERNAL MEDICINE | Facility: CLINIC | Age: 74
End: 2024-08-27

## 2024-08-27 VITALS
HEART RATE: 64 BPM | DIASTOLIC BLOOD PRESSURE: 80 MMHG | BODY MASS INDEX: 24.59 KG/M2 | WEIGHT: 152.31 LBS | OXYGEN SATURATION: 98 % | SYSTOLIC BLOOD PRESSURE: 118 MMHG

## 2024-08-27 DIAGNOSIS — G89.29 CHRONIC BILATERAL LOW BACK PAIN WITHOUT SCIATICA: ICD-10-CM

## 2024-08-27 DIAGNOSIS — E78.2 MIXED HYPERLIPIDEMIA: ICD-10-CM

## 2024-08-27 DIAGNOSIS — Z00.00 HEALTH MAINTENANCE EXAMINATION: ICD-10-CM

## 2024-08-27 DIAGNOSIS — Z85.828 HISTORY OF BASAL CELL CANCER: ICD-10-CM

## 2024-08-27 DIAGNOSIS — M54.50 CHRONIC BILATERAL LOW BACK PAIN WITHOUT SCIATICA: ICD-10-CM

## 2024-08-27 DIAGNOSIS — E55.9 VITAMIN D DEFICIENCY: ICD-10-CM

## 2024-08-27 DIAGNOSIS — M54.50 CHRONIC BILATERAL LOW BACK PAIN, UNSPECIFIED WHETHER SCIATICA PRESENT: ICD-10-CM

## 2024-08-27 DIAGNOSIS — Z91.09 ENVIRONMENTAL ALLERGIES: ICD-10-CM

## 2024-08-27 DIAGNOSIS — I10 ESSENTIAL HYPERTENSION: Primary | ICD-10-CM

## 2024-08-27 DIAGNOSIS — G89.29 CHRONIC BILATERAL LOW BACK PAIN, UNSPECIFIED WHETHER SCIATICA PRESENT: ICD-10-CM

## 2024-08-27 DIAGNOSIS — I77.811 AORTIC ECTASIA, ABDOMINAL: ICD-10-CM

## 2024-08-27 PROCEDURE — 1126F AMNT PAIN NOTED NONE PRSNT: CPT | Mod: CPTII,S$GLB,, | Performed by: STUDENT IN AN ORGANIZED HEALTH CARE EDUCATION/TRAINING PROGRAM

## 2024-08-27 PROCEDURE — 99214 OFFICE O/P EST MOD 30 MIN: CPT | Mod: S$GLB,,, | Performed by: STUDENT IN AN ORGANIZED HEALTH CARE EDUCATION/TRAINING PROGRAM

## 2024-08-27 PROCEDURE — 3044F HG A1C LEVEL LT 7.0%: CPT | Mod: CPTII,S$GLB,, | Performed by: STUDENT IN AN ORGANIZED HEALTH CARE EDUCATION/TRAINING PROGRAM

## 2024-08-27 PROCEDURE — 3061F NEG MICROALBUMINURIA REV: CPT | Mod: CPTII,S$GLB,, | Performed by: STUDENT IN AN ORGANIZED HEALTH CARE EDUCATION/TRAINING PROGRAM

## 2024-08-27 PROCEDURE — 1159F MED LIST DOCD IN RCRD: CPT | Mod: CPTII,S$GLB,, | Performed by: STUDENT IN AN ORGANIZED HEALTH CARE EDUCATION/TRAINING PROGRAM

## 2024-08-27 PROCEDURE — 72110 X-RAY EXAM L-2 SPINE 4/>VWS: CPT | Mod: TC,FY

## 2024-08-27 PROCEDURE — G2211 COMPLEX E/M VISIT ADD ON: HCPCS | Mod: S$GLB,,, | Performed by: STUDENT IN AN ORGANIZED HEALTH CARE EDUCATION/TRAINING PROGRAM

## 2024-08-27 PROCEDURE — 3074F SYST BP LT 130 MM HG: CPT | Mod: CPTII,S$GLB,, | Performed by: STUDENT IN AN ORGANIZED HEALTH CARE EDUCATION/TRAINING PROGRAM

## 2024-08-27 PROCEDURE — 99999 PR PBB SHADOW E&M-EST. PATIENT-LVL IV: CPT | Mod: PBBFAC,,, | Performed by: STUDENT IN AN ORGANIZED HEALTH CARE EDUCATION/TRAINING PROGRAM

## 2024-08-27 PROCEDURE — 3288F FALL RISK ASSESSMENT DOCD: CPT | Mod: CPTII,S$GLB,, | Performed by: STUDENT IN AN ORGANIZED HEALTH CARE EDUCATION/TRAINING PROGRAM

## 2024-08-27 PROCEDURE — 72110 X-RAY EXAM L-2 SPINE 4/>VWS: CPT | Mod: 26,,, | Performed by: RADIOLOGY

## 2024-08-27 PROCEDURE — 4010F ACE/ARB THERAPY RXD/TAKEN: CPT | Mod: CPTII,S$GLB,, | Performed by: STUDENT IN AN ORGANIZED HEALTH CARE EDUCATION/TRAINING PROGRAM

## 2024-08-27 PROCEDURE — 3066F NEPHROPATHY DOC TX: CPT | Mod: CPTII,S$GLB,, | Performed by: STUDENT IN AN ORGANIZED HEALTH CARE EDUCATION/TRAINING PROGRAM

## 2024-08-27 PROCEDURE — 1101F PT FALLS ASSESS-DOCD LE1/YR: CPT | Mod: CPTII,S$GLB,, | Performed by: STUDENT IN AN ORGANIZED HEALTH CARE EDUCATION/TRAINING PROGRAM

## 2024-08-27 PROCEDURE — 3079F DIAST BP 80-89 MM HG: CPT | Mod: CPTII,S$GLB,, | Performed by: STUDENT IN AN ORGANIZED HEALTH CARE EDUCATION/TRAINING PROGRAM

## 2024-08-27 RX ORDER — LOSARTAN POTASSIUM 25 MG/1
25 TABLET ORAL DAILY
Qty: 90 TABLET | Refills: 3 | Status: SHIPPED | OUTPATIENT
Start: 2024-08-27

## 2024-08-27 RX ORDER — LEVOCETIRIZINE DIHYDROCHLORIDE 5 MG/1
5 TABLET, FILM COATED ORAL NIGHTLY
Qty: 90 TABLET | Refills: 1 | Status: SHIPPED | OUTPATIENT
Start: 2024-08-27

## 2024-08-27 NOTE — PROGRESS NOTES
Subjective:       Patient ID: Zion Guillen is a 74 y.o. male.    Chief Complaint: Essential hypertension [I10]    Patient is established with me, here today for the following:     HTN, HLD, abdominal aortic ectasia, vitamin D deficiency, environmental allergies, history of basal cell     Health maintenance -   Cologuard negative SEP2023.   Denies family history of colorectal cancer.  Family history of cardiac disease.  Denies family history of prostate cancer.  UTD on Tdap, COVID, PPSV23, PCV13, shingles, RSV vaccinations.  Due for COVID vaccination.  Started smoking at age early teens, at most 1 PPD. Stopped smoking around 1993.  Drinks alcohol 2 times weekly, 1-2 drinks per sitting.  Denies drug use.  Completed HIV and hepatitis C screening.  UTD on diabetes screening.  Lab Results       Component                Value               Date                       HGBA1C                   5.2                 08/22/2024            Endorses overall healthy diet.   Endorses exercising routinely.                Walking every morning  Still swimming routinely.  Also using rowing machine.      HTN -   Currently prescribed losartan and amlodipine.  Patient endorses taking medication as directed.  Denies side effects or concerns while taking medication.  Lab Results       Component                Value               Date                       MICALBCREAT              10.5                08/22/2024            BP Readings from Last 5 Encounters:  02/28/24 : 128/70  10/23/23 : 116/64  10/02/23 : (!) 149/82  08/28/23 : 127/77  07/18/23 : 120/70    HLD -   Endorses taking atorvastatin as directed  Denies side effects or concerns while taking medication  Lab Results       Component                Value               Date                       CHOL                     128                 08/22/2024            Lab Results       Component                Value               Date                       TRIG                     68        "           08/22/2024            Lab Results       Component                Value               Date                       LDLCALC                  65.4                08/22/2024            Lab Results       Component                Value               Date                       HDL                      49                  08/22/2024               Vitamin D deficiency -  Currently taking vitamin D3 2000 IU daily supplementation.  Lab Results       Component                Value               Date                       [id number]             43                  08/22/2024                 [id number]             45                  09/06/2023                 [id number]             49                  01/24/2022                              Taking Zyrtec for environmental allergies with incomplete effect  Using azelastine PRN for allergies  Had significant flare over the weekend  Notes insufficient response with current regimen when experiencing allergy flares     History of basal cell -  Following with Dr. Huang routinely for dermatology.     Abdominal aorta u/s AUG2023 showed "Ectasia of the abdominal aorta with no evidence for aneurysm."  Due for repeat u/s in AUG2026     Endorses low back pain that occurs when first wakes up in the morning  Pain improves/resolves once moving and physical active  Feels stiff with bending over to get dressed in the morning  Has been occurring chronically                Review of Systems   Constitutional:  Negative for chills, fatigue, fever and unexpected weight change.   Respiratory:  Negative for cough and shortness of breath.    Cardiovascular:  Negative for chest pain, palpitations and leg swelling.   Musculoskeletal:  Positive for back pain.   Neurological:  Negative for dizziness, syncope and headaches.         Current Outpatient Medications   Medication Instructions    albuterol (VENTOLIN HFA) 90 mcg/actuation inhaler 2 puffs, Inhalation, Every 6 hours PRN, Rescue    " amLODIPine (NORVASC) 10 mg, Oral, Daily    atorvastatin (LIPITOR) 40 mg, Oral, Daily    azelastine (ASTELIN) 274 mcg, Nasal, 2 times daily PRN    cetirizine (ZYRTEC) 10 mg, Oral, Daily    cholecalciferol, vitamin D3, (VITAMIN D3) 50 mcg (2,000 unit) Cap No dose, route, or frequency recorded.    clotrimazole-betamethasone 1-0.05% (LOTRISONE) cream Topical (Top), 2 times daily    diclofenac sodium (VOLTAREN) 2 g, Topical (Top), 4 times daily PRN    fluticasone propionate (FLONASE) 50 mcg/actuation nasal spray 1 spray, Each Nostril, Daily    glucosamine-chondroitin 500-400 mg tablet 2 tablets, Oral, Daily    losartan (COZAAR) 25 mg, Oral, Daily     Objective:      Vitals:    08/27/24 0925   BP: 118/80   Pulse: 64   SpO2: 98%   Weight: 69.1 kg (152 lb 5.4 oz)   PainSc: 0-No pain     Body mass index is 24.59 kg/m².    Physical Exam  Vitals reviewed.   Constitutional:       General: He is not in acute distress.     Appearance: Normal appearance. He is not ill-appearing or diaphoretic.   HENT:      Head: Normocephalic and atraumatic.      Right Ear: Tympanic membrane, ear canal and external ear normal. There is no impacted cerumen.      Left Ear: Tympanic membrane, ear canal and external ear normal. There is no impacted cerumen.      Nose: Nose normal. No rhinorrhea.      Mouth/Throat:      Mouth: Mucous membranes are moist.      Pharynx: Oropharynx is clear. No oropharyngeal exudate or posterior oropharyngeal erythema.   Eyes:      General: No scleral icterus.        Right eye: No discharge.         Left eye: No discharge.      Conjunctiva/sclera: Conjunctivae normal.   Neck:      Thyroid: No thyromegaly or thyroid tenderness.      Trachea: Trachea normal.   Cardiovascular:      Rate and Rhythm: Normal rate and regular rhythm.      Heart sounds: Normal heart sounds. No murmur heard.     No friction rub. No gallop.   Pulmonary:      Effort: Pulmonary effort is normal. No respiratory distress.      Breath sounds: Normal  breath sounds. No stridor. No wheezing, rhonchi or rales.   Abdominal:      General: Bowel sounds are normal. There is no distension.      Palpations: Abdomen is soft.      Tenderness: There is no abdominal tenderness. There is no guarding or rebound.   Musculoskeletal:         General: No swelling or deformity.      Cervical back: Neck supple.      Comments:   PARIS negative bilaterally  Straight leg raise negative bilaterally   Non-TTP upper gluteal region bilaterally  Non-TTP SI joint bilaterally  Non-TTP trochanteric bursa bilaterally  No pain with forward flexion  No pain with facet loading      Lymphadenopathy:      Head:      Right side of head: No submandibular or posterior auricular adenopathy.      Left side of head: No submandibular or posterior auricular adenopathy.      Cervical: No cervical adenopathy.      Right cervical: No superficial, deep or posterior cervical adenopathy.     Left cervical: No superficial, deep or posterior cervical adenopathy.      Upper Body:      Right upper body: No supraclavicular adenopathy.      Left upper body: No supraclavicular adenopathy.   Skin:     General: Skin is warm and dry.   Neurological:      General: No focal deficit present.      Mental Status: He is alert. Mental status is at baseline.      Gait: Gait normal.   Psychiatric:         Mood and Affect: Mood normal.         Behavior: Behavior normal.         Assessment:       1. Essential hypertension    2. Mixed hyperlipidemia    3. Vitamin D deficiency    4. Environmental allergies    5. History of basal cell cancer    6. Aortic ectasia, abdominal    7. Chronic bilateral low back pain without sciatica    8. Health maintenance examination        Plan:       Essential hypertension  Continue current medications.  RTC in 6 months for follow up.  -     losartan (COZAAR) 25 MG tablet; Take 1 tablet (25 mg total) by mouth once daily.    Mixed hyperlipidemia  Continue current medications.  RTC in 6 months for follow  up.    Vitamin D deficiency  Continue supplementation.  RTC in 6 months for follow up.    Environmental allergies  Discontinue Zyrtec, start Xyzal  Continue azelastine  Start Singulair nightly  RTC in 6 months for follow up.  -     levocetirizine (XYZAL) 5 MG tablet; Take 1 tablet (5 mg total) by mouth every evening.  -     montelukast (SINGULAIR) 10 mg tablet; Take 1 tablet (10 mg total) by mouth nightly as needed (allergies).    History of basal cell cancer  Continue evaluation and management per dermatologist.    Aortic ectasia, abdominal  Repeat u/s as planned    Chronic bilateral low back pain without sciatica  Possible arthritic pain, recommend heat on painful areas when first wakes up, followed by stretching regimen  RTC PRN.  -     X-Ray Lumbar Spine Ap Lateral w/Flex Ext; Future    Health maintenance examination  Reviewed and discussed age appropriate screenings and immunizations.      Shoshana Richards MD  8/27/2024

## 2024-08-28 RX ORDER — MONTELUKAST SODIUM 10 MG/1
10 TABLET ORAL NIGHTLY PRN
Qty: 90 TABLET | Refills: 1 | Status: SHIPPED | OUTPATIENT
Start: 2024-08-28

## 2024-08-28 NOTE — TELEPHONE ENCOUNTER
Sat on hold for at least 5 mins waiting to see if the pharmacy received her Singulair rx sent in today. Need another try to reach pharmacy.

## 2024-09-06 ENCOUNTER — TELEPHONE (OUTPATIENT)
Dept: AUDIOLOGY | Facility: CLINIC | Age: 74
End: 2024-09-06
Payer: MEDICARE

## 2024-09-06 ENCOUNTER — PATIENT MESSAGE (OUTPATIENT)
Dept: AUDIOLOGY | Facility: CLINIC | Age: 74
End: 2024-09-06
Payer: MEDICARE

## 2024-09-16 ENCOUNTER — CLINICAL SUPPORT (OUTPATIENT)
Dept: AUDIOLOGY | Facility: CLINIC | Age: 74
End: 2024-09-16
Payer: MEDICARE

## 2024-09-16 DIAGNOSIS — H90.3 SENSORINEURAL HEARING LOSS, BILATERAL: Primary | ICD-10-CM

## 2024-09-16 NOTE — PROGRESS NOTES
Hearing Aid Consultation:    Mr. Guillen was seen today for a hearing aid consultation. Mr. Guillen lost his hearing aids a few weeks ago and is here today to order new hearing aids. Mr. Guillen was counseled on different hearing aid styles and models. He opted to move forward with purchasing binaural Phonak Infinio Sphere 90 SRI R hearing aids with the itemized package. Mr. Guillen was informed of the $250 non-refundable deposit, 30 day trial period, 3 year warranty, and that we are unable to file with insurance on the patient's behalf. He will return for a hearing aid evaluation when his new hearing aids arrive to clinic. A new audiogram is needed, since his previous audio is older than 6 months.

## 2024-09-20 ENCOUNTER — TELEPHONE (OUTPATIENT)
Dept: AUDIOLOGY | Facility: CLINIC | Age: 74
End: 2024-09-20
Payer: MEDICARE

## 2024-09-25 DIAGNOSIS — Z00.00 ENCOUNTER FOR MEDICARE ANNUAL WELLNESS EXAM: ICD-10-CM

## 2024-09-26 ENCOUNTER — CLINICAL SUPPORT (OUTPATIENT)
Dept: AUDIOLOGY | Facility: CLINIC | Age: 74
End: 2024-09-26
Payer: MEDICARE

## 2024-09-26 DIAGNOSIS — H90.3 SENSORINEURAL HEARING LOSS, BILATERAL: Primary | ICD-10-CM

## 2024-09-26 PROCEDURE — 99499 UNLISTED E&M SERVICE: CPT | Mod: HCNC,S$GLB,, | Performed by: AUDIOLOGIST

## 2024-09-26 NOTE — PROGRESS NOTES
Audiologic Evaluation 9/26/2024:       Zion Guillen, a 74 y.o. male, was seen today in the clinic for an audiologic evaluation for hearing loss.  Mr. Guillen reported decreased hearing bilaterally. Mr. Guillen denied perceived hearing loss, tinnitus, and otalgia.      Tympanometry revealed Type A tympanogram in the right ear and Type A tympanogram in the left ear. Audiogram results revealed normal hearing sloping to moderate sensorineural hearing loss in the right ear and normal hearing sloping to moderate sensorineural hearing loss in the left ear.  Speech reception thresholds were noted at 20 dB in the right ear and 20 dB in the left ear.  Speech discrimination scores were 100% in the right ear and 92% in the left ear.    Recommendations:  Otologic evaluation  Daily use of hearing aids  Annual audiogram  Hearing protection when in noise

## 2024-09-26 NOTE — PROGRESS NOTES
Hearing Aid Evaluation:    Mr. Guillen was seen today for a hearing aid evaluation to be fit with bilateral Phonak Infinio Sphere 90 SRI R hearing aids. The hearing aids were programmed to Mr. Guillen's most recent audiogram and feedback analyzer was run with good outcomes. Mr. Guillen reported comfortable and clear sound quality. Settings will be verified using real ear speech mapping measurements at first f/u. Mr. Guillen demonstrated understanding of proper insertion/removal, recharging, and cleaning/maintenance. He was informed that his 90 days of included services with the itemized service plan will  2024. Mr. Guillen was counseled on the 30 day trial period, 3 year warranty, $250 non-refundable deposit, and that we are unable to file with insurance. Mr. Guillen's hearing aids were paired to his iphone for streaming and use with the [employer] luana. He will return for f/u in 2 weeks.     Itemized: 24  Invoice Date: 2024  Phonak [id number]  Silver Batres  LT SN: [id number]  RT SN: [id number]   6: M1  Dome: Small Open  Warranty Exp: 10/15/2027    [employer]ger[employer] LALI  SN: [id number]  Warranty Exp: 10/15/2027

## 2024-10-11 ENCOUNTER — CLINICAL SUPPORT (OUTPATIENT)
Dept: AUDIOLOGY | Facility: CLINIC | Age: 74
End: 2024-10-11
Payer: MEDICARE

## 2024-10-11 DIAGNOSIS — H90.3 SENSORINEURAL HEARING LOSS, BILATERAL: Primary | ICD-10-CM

## 2024-10-11 NOTE — PROGRESS NOTES
Hearing Aid Follow-up    Mr. Guillen was seen today for a two week follow-up.  He reports he is doing well with his Phonak Infinio Sphere 90 SRI R hearing aids.  He noted improvement in difficult listening environment compared to his previous hearing aids.  Mr. Guillen was shown how to adjust the hearing aid microphone directionality in his luana under Speech Focus.  His hearing aids were cleaned and checked.  Excessive wax was noted in the receivers.  Mr. Guillen was reminded to check his filters and change as needed.  The hearing aids were connected to the [employer] fitting software.  His settings were verified using Real Ear Speech Mapping with good outcomes.  Mr. Guillen declined additional adjustments.  He will return for follow-up in one month or sooner as needed.    Itemized: 12/26/24    Invoice Date: 9/16/2024  Phonak [id number]  Silver Batres  LT SN: [id number]  RT SN: [id number]   6: M1  Dome: Small Open  Warranty Exp: 10/15/2027    JayeshrArtie ELIAS  SN: [id number]  Warranty Exp: 10/15/2027

## 2024-10-13 DIAGNOSIS — I10 BENIGN HYPERTENSION: ICD-10-CM

## 2024-10-14 ENCOUNTER — PATIENT MESSAGE (OUTPATIENT)
Dept: AUDIOLOGY | Facility: CLINIC | Age: 74
End: 2024-10-14
Payer: MEDICARE

## 2024-10-14 RX ORDER — AMLODIPINE BESYLATE 10 MG/1
10 TABLET ORAL DAILY
Qty: 90 TABLET | Refills: 3 | Status: SHIPPED | OUTPATIENT
Start: 2024-10-14

## 2024-11-12 ENCOUNTER — CLINICAL SUPPORT (OUTPATIENT)
Dept: AUDIOLOGY | Facility: CLINIC | Age: 74
End: 2024-11-12
Payer: MEDICARE

## 2024-11-12 DIAGNOSIS — H90.3 SENSORINEURAL HEARING LOSS, BILATERAL: Primary | ICD-10-CM

## 2024-11-12 PROCEDURE — 99499 UNLISTED E&M SERVICE: CPT | Mod: HCNC,S$GLB,, | Performed by: AUDIOLOGIST

## 2024-11-12 NOTE — PROGRESS NOTES
Hearing Aid Follow-up:    Mr. Guillen was seen today for a hearing aid follow-up appointment. He reported he really likes his new hearing aids. Mr. Guillen's hearing aids were cleaned and checked. A listening check confirmed both devices to be in good working condition following cleaning. Mr. Guillen declined additional programming adjustments. We reviewed how utilize the luana best in difficult listening environments. Mr. Guillen will return for f/u in 6 months.        Hearing Aid Information:  Itemized: 12/26/24    Invoice Date: 9/16/2024  Phonak [id number]  Silver Batres  LT SN: [id number]  RT SN: [id number]   6: M1  Dome: Small Open  Warranty Exp: 10/15/2027    Karoline ELIAS  SN: [id number]  Warranty Exp: 10/15/2027

## 2024-11-24 DIAGNOSIS — Z91.09 ENVIRONMENTAL ALLERGIES: ICD-10-CM

## 2024-11-25 RX ORDER — LEVOCETIRIZINE DIHYDROCHLORIDE 5 MG/1
5 TABLET, FILM COATED ORAL NIGHTLY
Qty: 90 TABLET | Refills: 3 | Status: SHIPPED | OUTPATIENT
Start: 2024-11-25

## 2024-11-25 NOTE — TELEPHONE ENCOUNTER
Refill Encounter    PCP Visits: Recent Visits  Date Type Provider Dept   08/27/24 Office Visit Shoshana Richards MD [facility]   02/28/24 Office Visit Shoshana Richards MD [facility]   Showing recent visits within past 360 days and meeting all other requirements  Future Appointments  Date Type Provider Dept   02/18/25 Appointment Shoshana Richards MD [facility]   Showing future appointments within next 720 days and meeting all other requirements     Last 3 Blood Pressure:   BP Readings from Last 3 Encounters:   08/27/24 118/80   02/28/24 128/70   10/23/23 116/64     Preferred Pharmacy:   [facility]/pharmacy #[id number] [place], [place] - [address]  [address] [address]  Phone: [phone] Fax: [phone]    Requested RX:  Requested Prescriptions     Pending Prescriptions Disp Refills    levocetirizine (XYZAL) 5 MG tablet 90 tablet 1     Sig: Take 1 tablet (5 mg total) by mouth every evening.      RX Route: Normal

## 2024-11-25 NOTE — TELEPHONE ENCOUNTER
No care due was identified.  [facility] Embedded Care Due Messages. Reference number: [id number].   11/24/2024 9:38:08 PM CST

## 2024-11-30 DIAGNOSIS — I10 ESSENTIAL HYPERTENSION: ICD-10-CM

## 2024-11-30 NOTE — TELEPHONE ENCOUNTER
No care due was identified.  [facility] Embedded Care Due Messages. Reference number: [id number].   11/30/2024 10:41:23 AM CST

## 2024-12-02 RX ORDER — LOSARTAN POTASSIUM 25 MG/1
25 TABLET ORAL DAILY
Qty: 90 TABLET | Refills: 2 | Status: SHIPPED | OUTPATIENT
Start: 2024-12-02

## 2024-12-02 NOTE — TELEPHONE ENCOUNTER
Refill Decision Note   Zion Guillen  is requesting a refill authorization.  Brief Assessment and Rationale for Refill:  Approve     Medication Therapy Plan:         Comments:     Note composed:2:00 PM 12/02/2024

## 2024-12-02 NOTE — TELEPHONE ENCOUNTER
Refill Encounter    PCP Visits: Recent Visits  Date Type Provider Dept   08/27/24 Office Visit Shoshana Richards MD [facility]   02/28/24 Office Visit Shoshana Richards MD [facility]   Showing recent visits within past 360 days and meeting all other requirements  Future Appointments  Date Type Provider Dept   02/18/25 Appointment Shoshana Richards MD [facility]   Showing future appointments within next 720 days and meeting all other requirements     Last 3 Blood Pressure:   BP Readings from Last 3 Encounters:   08/27/24 118/80   02/28/24 128/70   10/23/23 116/64     Preferred Pharmacy:   [facility]/pharmacy #[id number] - [place], [place] - [address]  [address] [address]  Phone: [phone] Fax: [phone]    Requested RX:  Requested Prescriptions     Pending Prescriptions Disp Refills    losartan (COZAAR) 25 MG tablet 90 tablet 3     Sig: Take 1 tablet (25 mg total) by mouth once daily.      RX Route: Normal

## 2024-12-09 ENCOUNTER — PATIENT MESSAGE (OUTPATIENT)
Dept: INTERNAL MEDICINE | Facility: CLINIC | Age: 74
End: 2024-12-09
Payer: MEDICARE

## 2024-12-10 ENCOUNTER — PATIENT MESSAGE (OUTPATIENT)
Dept: INTERNAL MEDICINE | Facility: CLINIC | Age: 74
End: 2024-12-10

## 2024-12-10 ENCOUNTER — OFFICE VISIT (OUTPATIENT)
Dept: INTERNAL MEDICINE | Facility: CLINIC | Age: 74
End: 2024-12-10
Payer: MEDICARE

## 2024-12-10 VITALS
HEART RATE: 65 BPM | RESPIRATION RATE: 18 BRPM | DIASTOLIC BLOOD PRESSURE: 71 MMHG | BODY MASS INDEX: 24.48 KG/M2 | SYSTOLIC BLOOD PRESSURE: 116 MMHG | HEIGHT: 66 IN | WEIGHT: 152.31 LBS

## 2024-12-10 DIAGNOSIS — H01.002 BLEPHARITIS OF RIGHT LOWER EYELID, UNSPECIFIED TYPE: Primary | ICD-10-CM

## 2024-12-10 DIAGNOSIS — Z91.09 ENVIRONMENTAL ALLERGIES: ICD-10-CM

## 2024-12-10 DIAGNOSIS — H00.012 HORDEOLUM EXTERNUM OF RIGHT LOWER EYELID: ICD-10-CM

## 2024-12-10 DIAGNOSIS — J30.9 ALLERGIC RHINITIS, UNSPECIFIED SEASONALITY, UNSPECIFIED TRIGGER: ICD-10-CM

## 2024-12-10 DIAGNOSIS — I10 ESSENTIAL HYPERTENSION: ICD-10-CM

## 2024-12-10 PROCEDURE — 3061F NEG MICROALBUMINURIA REV: CPT | Mod: HCNC,CPTII,S$GLB,

## 2024-12-10 PROCEDURE — 3044F HG A1C LEVEL LT 7.0%: CPT | Mod: HCNC,CPTII,S$GLB,

## 2024-12-10 PROCEDURE — 3078F DIAST BP <80 MM HG: CPT | Mod: HCNC,CPTII,S$GLB,

## 2024-12-10 PROCEDURE — 1101F PT FALLS ASSESS-DOCD LE1/YR: CPT | Mod: HCNC,CPTII,S$GLB,

## 2024-12-10 PROCEDURE — 3074F SYST BP LT 130 MM HG: CPT | Mod: HCNC,CPTII,S$GLB,

## 2024-12-10 PROCEDURE — 99214 OFFICE O/P EST MOD 30 MIN: CPT | Mod: HCNC,S$GLB,,

## 2024-12-10 PROCEDURE — 1126F AMNT PAIN NOTED NONE PRSNT: CPT | Mod: HCNC,CPTII,S$GLB,

## 2024-12-10 PROCEDURE — 99999 PR PBB SHADOW E&M-EST. PATIENT-LVL III: CPT | Mod: PBBFAC,HCNC,,

## 2024-12-10 PROCEDURE — 4010F ACE/ARB THERAPY RXD/TAKEN: CPT | Mod: HCNC,CPTII,S$GLB,

## 2024-12-10 PROCEDURE — 1159F MED LIST DOCD IN RCRD: CPT | Mod: HCNC,CPTII,S$GLB,

## 2024-12-10 PROCEDURE — 3066F NEPHROPATHY DOC TX: CPT | Mod: HCNC,CPTII,S$GLB,

## 2024-12-10 PROCEDURE — 3288F FALL RISK ASSESSMENT DOCD: CPT | Mod: HCNC,CPTII,S$GLB,

## 2024-12-10 RX ORDER — ERYTHROMYCIN 5 MG/G
OINTMENT OPHTHALMIC 4 TIMES DAILY
Qty: 1 G | Refills: 0 | Status: SHIPPED | OUTPATIENT
Start: 2024-12-10 | End: 2024-12-17

## 2024-12-10 NOTE — ASSESSMENT & PLAN NOTE
- Recommend warm compresses when possible  - Recommend topical erythromycin ointment QID x 7 days  - Follow up before weekend to assess for improvement

## 2024-12-10 NOTE — PROGRESS NOTES
INTERNAL MEDICINE  [facility]    Reason for visit:   Chief Complaint   Patient presents with    Belepharitis     HPI: Zion Guillen is a 74 y.o. male presenting today for eyelid swelling.    Patient is an established patient of PCP, Dr. Shoshana Richards MD. This patient is new to me.    EYE SYMPTOMS:  He reports sudden onset of pain and swelling in the right lower eyelid, along with swelling on the upper cheek beginning 5 days ago. He describes an intermittent itching sensation that can become severe. The discomfort is limited to the eyelid, with no pain in the eye itself. He expresses particular concern about these symptoms. In the left eye, he reports occasional itching, also limited to the eyelid. He is currently using an OTC cream from [facility] for the right eye and eye drops for the left eye. He speculates that the left eye itching may be psychological.    MEDICAL HISTORY:  He reports a history of having a stye years ago. He also mentions a history of sinus problems, for which a colleague previously recommended using a nose plug while swimming, which he found helpful. He discloses long-standing allergies to pollen and dust.    LIFESTYLE:  He reports regular swimming as part of his lifestyle and wears goggles for eye protection while swimming.      ROS:  General: -fever, -chills, -fatigue, -weight gain, -weight loss  Eyes: -vision changes, -redness, -discharge, -eye pain, +eyelid swelling  ENT: -ear pain, -nasal congestion, -sore throat  Skin: -rash, -lesion, +itching  Neurological: -headache, -dizziness, -numbness, -tingling  Head: -facial pain         Social History     Social History Narrative    Not on file       ALLERGIES:   Review of patient's allergies indicates:  No Known Allergies    MEDS:   Current Outpatient Medications on File Prior to Visit   Medication Sig Dispense Refill Last Dose/Taking    albuterol (VENTOLIN HFA) 90 mcg/actuation inhaler Inhale 2 puffs into the lungs every 6  "(six) hours as needed for Wheezing. Rescue 18 g 3     amLODIPine (NORVASC) 10 MG tablet Take 1 tablet (10 mg total) by mouth once daily. 90 tablet 3     atorvastatin (LIPITOR) 40 MG tablet Take 1 tablet (40 mg total) by mouth once daily. 90 tablet 3     azelastine (ASTELIN) 137 mcg (0.1 %) nasal spray 2 sprays (274 mcg total) by Nasal route 2 (two) times daily as needed for Rhinitis. 90 mL 3     cholecalciferol, vitamin D3, (VITAMIN D3) 50 mcg (2,000 unit) Cap        clotrimazole-betamethasone 1-0.05% (LOTRISONE) cream Apply topically 2 (two) times daily. (Patient not taking: Reported on 2/28/2024) 45 g 0     diclofenac sodium (VOLTAREN) 1 % Gel Apply 2 g topically 4 (four) times daily as needed (pain). 100 g 3     glucosamine-chondroitin 500-400 mg tablet Take 2 tablets by mouth once daily. (Patient not taking: Reported on 8/27/2024)       levocetirizine (XYZAL) 5 MG tablet Take 1 tablet (5 mg total) by mouth every evening. 90 tablet 3     losartan (COZAAR) 25 MG tablet Take 1 tablet (25 mg total) by mouth once daily. 90 tablet 2     montelukast (SINGULAIR) 10 mg tablet Take 1 tablet (10 mg total) by mouth nightly as needed (allergies). 90 tablet 1     [DISCONTINUED] cetirizine (ZYRTEC) 10 MG tablet Take 10 mg by mouth once daily.       [DISCONTINUED] fluticasone propionate (FLONASE) 50 mcg/actuation nasal spray 1 spray by Each Nostril route once daily.          Vital signs:   Vitals:    12/10/24 0903   BP: 116/71   Pulse: 65   Resp: 18   Weight: 69.1 kg (152 lb 5.4 oz)   Height: 5' 6" (1.676 m)     Body mass index is 24.59 kg/m².    PHYSICAL EXAM:     Physical Exam    General: No acute distress. Well-developed. Well-nourished.  Eyes: EOMI. Sclerae anicteric. Blepharitis to right lower inner lid. No eye drainage or redness.  HENT: Normocephalic. Atraumatic. Nares patent. Moist oral mucosa. Small area of swelling on right upper cheek.  Cardiovascular: Regular rate.   Respiratory: Normal respiratory effort. "   Musculoskeletal: No obvious deformity.  Neurological: Alert & oriented x3. No slurred speech.   Psychiatric: Normal mood. Normal affect. Good insight. Good judgment.  Skin: Warm. Dry. Right upper cheek with very small area of swelling and mild erythema. Blepharitis right lower inner lid.        PERTINENT RESULTS:   No visits with results within 1 Week(s) from this visit.   Latest known visit with results is:   Lab Visit on 08/22/2024   Component Date Value Ref Range Status    Sodium 08/22/2024 143  136 - 145 mmol/L Final    Potassium 08/22/2024 4.0  3.5 - 5.1 mmol/L Final    Chloride 08/22/2024 112 (H)  95 - 110 mmol/L Final    CO2 08/22/2024 24  23 - 29 mmol/L Final    Glucose 08/22/2024 91  70 - 110 mg/dL Final    BUN 08/22/2024 10  8 - 23 mg/dL Final    Creatinine 08/22/2024 0.8  0.5 - 1.4 mg/dL Final    Calcium 08/22/2024 9.3  8.7 - 10.5 mg/dL Final    Total Protein 08/22/2024 6.9  6.0 - 8.4 g/dL Final    Albumin 08/22/2024 4.2  3.5 - 5.2 g/dL Final    Total Bilirubin 08/22/2024 0.9  0.1 - 1.0 mg/dL Final    Comment: For infants and newborns, interpretation of results should be based  on gestational age, weight and in agreement with clinical  observations.    Premature Infant recommended reference ranges:  Up to 24 hours.............<8.0 mg/dL  Up to 48 hours............<12.0 mg/dL  3-5 days..................<15.0 mg/dL  6-29 days.................<15.0 mg/dL      Alkaline Phosphatase 08/22/2024 61  55 - 135 U/L Final    AST 08/22/2024 26  10 - 40 U/L Final    ALT 08/22/2024 25  10 - 44 U/L Final    eGFR 08/22/2024 >60  >60 mL/min/1.73 m^2 Final    Anion Gap 08/22/2024 7 (L)  8 - 16 mmol/L Final    TSH 08/22/2024 1.690  0.400 - 4.000 uIU/mL Final    Cholesterol 08/22/2024 128  120 - 199 mg/dL Final    Comment: The National Cholesterol Education Program (NCEP) has set the  following guidelines (reference ranges) for Cholesterol:  Optimal.....................<200 mg/dL  Borderline High.............200-239  mg/dL  High........................> or = 240 mg/dL      Triglycerides 08/22/2024 68  30 - 150 mg/dL Final    Comment: The National Cholesterol Education Program (NCEP) has set the  following guidelines (reference values) for triglycerides:  Normal......................<150 mg/dL  Borderline High.............150-199 mg/dL  High........................200-499 mg/dL      HDL 08/22/2024 49  40 - 75 mg/dL Final    Comment: The National Cholesterol Education Program (NCEP) has set the  following guidelines (reference values) for HDL Cholesterol:  Low...............<40 mg/dL  Optimal...........>60 mg/dL      LDL Cholesterol 08/22/2024 65.4  63.0 - 159.0 mg/dL Final    Comment: The National Cholesterol Education Program (NCEP) has set the  following guidelines (reference values) for LDL Cholesterol:  Optimal.......................<130 mg/dL  Borderline High...............130-159 mg/dL  High..........................160-189 mg/dL  Very High.....................>190 mg/dL      HDL/Cholesterol Ratio 08/22/2024 38.3  20.0 - 50.0 % Final    Total Cholesterol/HDL Ratio 08/22/2024 2.6  2.0 - 5.0 Final    Non-HDL Cholesterol 08/22/2024 79  mg/dL Final    Comment: Risk category and Non-HDL cholesterol goals:  Coronary heart disease (CHD)or equivalent (10-year risk of CHD >20%):  Non-HDL cholesterol goal     <130 mg/dL  Two or more CHD risk factors and 10-year risk of CHD <= 20%:  Non-HDL cholesterol goal     <160 mg/dL  0 to 1 CHD risk factor:  Non-HDL cholesterol goal     <190 mg/dL      Hemoglobin A1C 08/22/2024 5.2  4.0 - 5.6 % Final    Comment: ADA Screening Guidelines:  5.7-6.4%  Consistent with prediabetes  >or=6.5%  Consistent with diabetes    High levels of fetal hemoglobin interfere with the HbA1C  assay. Heterozygous hemoglobin variants (HbS, HgC, etc)do  not significantly interfere with this assay.   However, presence of multiple variants may affect accuracy.      Estimated Avg Glucose 08/22/2024 103  68 - 131 mg/dL  Final    WBC 08/22/2024 4.68  3.90 - 12.70 K/uL Final    RBC 08/22/2024 4.74  4.60 - 6.20 M/uL Final    Hemoglobin 08/22/2024 15.0  14.0 - 18.0 g/dL Final    Hematocrit 08/22/2024 45.4  40.0 - 54.0 % Final    MCV 08/22/2024 96  82 - 98 fL Final    MCH 08/22/2024 31.6 (H)  27.0 - 31.0 pg Final    MCHC 08/22/2024 33.0  32.0 - 36.0 g/dL Final    RDW 08/22/2024 12.5  11.5 - 14.5 % Final    Platelets 08/22/2024 197  150 - 450 K/uL Final    MPV 08/22/2024 10.1  9.2 - 12.9 fL Final    Immature Granulocytes 08/22/2024 0.4  0.0 - 0.5 % Final    Gran # (ANC) 08/22/2024 2.4  1.8 - 7.7 K/uL Final    Immature Grans (Abs) 08/22/2024 0.02  0.00 - 0.04 K/uL Final    Comment: Mild elevation in immature granulocytes is non specific and   can be seen in a variety of conditions including stress response,   acute inflammation, trauma and pregnancy. Correlation with other   laboratory and clinical findings is essential.      Lymph # 08/22/2024 1.5  1.0 - 4.8 K/uL Final    Mono # 08/22/2024 0.4  0.3 - 1.0 K/uL Final    Eos # 08/22/2024 0.3  0.0 - 0.5 K/uL Final    Baso # 08/22/2024 0.03  0.00 - 0.20 K/uL Final    nRBC 08/22/2024 0  0 /100 WBC Final    Gran % 08/22/2024 52.2  38.0 - 73.0 % Final    Lymph % 08/22/2024 32.1  18.0 - 48.0 % Final    Mono % 08/22/2024 9.4  4.0 - 15.0 % Final    Eosinophil % 08/22/2024 5.3  0.0 - 8.0 % Final    Basophil % 08/22/2024 0.6  0.0 - 1.9 % Final    Differential Method 08/22/2024 Automated   Final    Microalbumin, Urine 08/22/2024 20.0  ug/mL Final    Creatinine, Urine 08/22/2024 190.2  23.0 - 375.0 mg/dL Final    Microalb/Creat Ratio 08/22/2024 10.5  0.0 - 30.0 ug/mg Final    Vit D, 25-Hydroxy 08/22/2024 43  30 - 96 ng/mL Final    Comment: Vitamin D deficiency.........<10 ng/mL                              Vitamin D insufficiency......10-29 ng/mL       Vitamin D sufficiency........> or equal to 30 ng/mL  Vitamin D toxicity............>100 ng/mL         ASSESSMENT/PLAN:    Assessment & Plan     IMPRESSION:  - Assessed patient's eye condition as potential hordeolum with blepharitis  - Determined topical antibiotic treatment appropriate based on clinical presentation  - Opted for conservative management with topical erythromycin and warm compresses  - Prepared contingency plan for oral antibiotics if no improvement seen by Friday    SQUAMOUS BLEPHARITIS (RIGHT LOWER EYELID):  - Explained blepharitis as an inflammation of the eyelid that can spread to surrounding areas if left untreated.  - Discussed potential causes of eye infections, including involuntary contact with bacteria.  - Emphasized the importance of protecting the eye itself from infection.  - Patient to apply warm compresses to affected eye area when possible.  - Patient to avoid swimming for the first 48 hours of treatment.  - Patient to continue using goggles while swimming after treatment period.  - Started erythromycin ointment, applied to inner corner of affected eye 4 times daily for 7 days.    ALLERGIC RHINITIS:  - Patient to continue taking daily antihistamine for seasonal allergies.  - Continued Montelukast (Singulair) nightly as previously prescribed.    UNSPECIFIED CONDITION:  - Continued glucosamine supplement (current formulation acceptable to finish).    FOLLOW-UP:  - Follow up in 3 days for reassessment of eye condition.  - Respond to scheduled message on Friday morning (13th) regarding improvement of eye condition.  - Contact the office if no improvement seen by weekend for potential oral antibiotic treatment.         1. Blepharitis of right lower eyelid, unspecified type  Assessment & Plan:  - Recommend warm compresses when possible  - Recommend topical erythromycin ointment QID x 7 days  - Follow up before weekend to assess for improvement      2. Hordeolum externum of right lower eyelid  -     erythromycin (ROMYCIN) ophthalmic ointment; Place into the right eye 4 (four) times daily. Apply 0.5 inch to affected eye four times  daily for 7 days  Dispense: 1 g; Refill: 0    3. Allergic rhinitis, unspecified seasonality, unspecified trigger  Assessment & Plan:  - Well controlled on xyzal and montelukast      4. Essential hypertension  Assessment & Plan:  Well controlled on current treatment      5. Environmental allergies  Assessment & Plan:  - Well controlled on xyzal and montelukast      Health maintenance addressed: UTD   Vaccines recommended: UTD    Follow up if symptoms worsen or fail to improve.     JON Faria-C   Internal Medicine

## 2025-01-08 ENCOUNTER — TELEPHONE (OUTPATIENT)
Dept: OPHTHALMOLOGY | Facility: CLINIC | Age: 75
End: 2025-01-08
Payer: MEDICARE

## 2025-01-08 NOTE — TELEPHONE ENCOUNTER
----- Message from Luna sent at 1/8/2025  2:16 PM CST -----  Type:  Sooner Apoointment Request  Caller is requesting a sooner appointment.  Name of Caller:Zion  When is the first available appointment?soon   Symptoms:Annual  Would the patient rather a call back or a response via [facility]? call  Best Call Back Number: [phone]  Additional Information:

## 2025-01-15 ENCOUNTER — PATIENT MESSAGE (OUTPATIENT)
Dept: INTERNAL MEDICINE | Facility: CLINIC | Age: 75
End: 2025-01-15
Payer: MEDICARE

## 2025-01-16 ENCOUNTER — OFFICE VISIT (OUTPATIENT)
Dept: INTERNAL MEDICINE | Facility: CLINIC | Age: 75
End: 2025-01-16
Payer: MEDICARE

## 2025-01-16 VITALS
BODY MASS INDEX: 24.98 KG/M2 | SYSTOLIC BLOOD PRESSURE: 110 MMHG | HEART RATE: 59 BPM | DIASTOLIC BLOOD PRESSURE: 70 MMHG | HEIGHT: 66 IN | OXYGEN SATURATION: 98 % | WEIGHT: 155.44 LBS

## 2025-01-16 DIAGNOSIS — H01.002 BLEPHARITIS OF RIGHT LOWER EYELID, UNSPECIFIED TYPE: Primary | ICD-10-CM

## 2025-01-16 PROCEDURE — 3078F DIAST BP <80 MM HG: CPT | Mod: HCNC,CPTII,S$GLB, | Performed by: INTERNAL MEDICINE

## 2025-01-16 PROCEDURE — 3074F SYST BP LT 130 MM HG: CPT | Mod: HCNC,CPTII,S$GLB, | Performed by: INTERNAL MEDICINE

## 2025-01-16 PROCEDURE — 1159F MED LIST DOCD IN RCRD: CPT | Mod: HCNC,CPTII,S$GLB, | Performed by: INTERNAL MEDICINE

## 2025-01-16 PROCEDURE — 99214 OFFICE O/P EST MOD 30 MIN: CPT | Mod: HCNC,S$GLB,, | Performed by: INTERNAL MEDICINE

## 2025-01-16 PROCEDURE — 99999 PR PBB SHADOW E&M-EST. PATIENT-LVL IV: CPT | Mod: PBBFAC,HCNC,, | Performed by: INTERNAL MEDICINE

## 2025-01-16 PROCEDURE — 1160F RVW MEDS BY RX/DR IN RCRD: CPT | Mod: HCNC,CPTII,S$GLB, | Performed by: INTERNAL MEDICINE

## 2025-01-16 RX ORDER — DOXYCYCLINE HYCLATE 100 MG
100 TABLET ORAL 2 TIMES DAILY
Qty: 20 TABLET | Refills: 0 | Status: SHIPPED | OUTPATIENT
Start: 2025-01-16 | End: 2025-01-26

## 2025-01-16 NOTE — PROGRESS NOTES
Subjective:      Patient ID: Zion Guillen is a 74 y.o. male.    Chief Complaint: Eye Pain (Right eye itchy. )    Blepharitis, right: Presents today with pain and swelling of right lower eyelid. This started beginning of December. He reports that when this initially happened, he had swelling of his right lower lid that was most concerning to him. On 12/10/2024 she was seen by primary care and was started on erythromycin ointment which did help with the symptoms. He is back due to recurrence. He has noted redness and itching that started to reoccur in the same area.  He denies vision changes, blurry vision, light sensitivity, pain, eye itching/tearing.     Denies any chest pain, shortness of breath, nausea vomiting constipation diarrhea, blood in stool, heartburn    Review of Systems   Constitutional:  Negative for chills, fever and weight loss.   HENT:  Negative for congestion, ear pain and sore throat.    Eyes:  Negative for blurred vision, double vision, photophobia, pain, discharge and redness.        Eyelid redness   Respiratory:  Negative for cough and shortness of breath.    Cardiovascular:  Negative for chest pain, palpitations and leg swelling.   Gastrointestinal:  Negative for abdominal pain, heartburn, nausea and vomiting.   Skin:  Negative for rash.   Neurological:  Negative for dizziness, tingling and headaches.   Psychiatric/Behavioral:  Negative for depression.          Current Outpatient Medications:     albuterol (VENTOLIN HFA) 90 mcg/actuation inhaler, Inhale 2 puffs into the lungs every 6 (six) hours as needed for Wheezing. Rescue, Disp: 18 g, Rfl: 3    amLODIPine (NORVASC) 10 MG tablet, Take 1 tablet (10 mg total) by mouth once daily., Disp: 90 tablet, Rfl: 3    atorvastatin (LIPITOR) 40 MG tablet, Take 1 tablet (40 mg total) by mouth once daily., Disp: 90 tablet, Rfl: 3    azelastine (ASTELIN) 137 mcg (0.1 %) nasal spray, 2 sprays (274 mcg total) by Nasal route 2 (two) times daily as needed  for Rhinitis., Disp: 90 mL, Rfl: 3    cholecalciferol, vitamin D3, (VITAMIN D3) 50 mcg (2,000 unit) Cap, , Disp: , Rfl:     diclofenac sodium (VOLTAREN) 1 % Gel, Apply 2 g topically 4 (four) times daily as needed (pain)., Disp: 100 g, Rfl: 3    glucosamine-chondroitin 500-400 mg tablet, Take 2 tablets by mouth once daily., Disp: , Rfl:     levocetirizine (XYZAL) 5 MG tablet, Take 1 tablet (5 mg total) by mouth every evening., Disp: 90 tablet, Rfl: 3    losartan (COZAAR) 25 MG tablet, Take 1 tablet (25 mg total) by mouth once daily., Disp: 90 tablet, Rfl: 2    montelukast (SINGULAIR) 10 mg tablet, Take 1 tablet (10 mg total) by mouth nightly as needed (allergies)., Disp: 90 tablet, Rfl: 1    clotrimazole-betamethasone 1-0.05% (LOTRISONE) cream, Apply topically 2 (two) times daily. (Patient not taking: Reported on 1/16/2025), Disp: 45 g, Rfl: 0    doxycycline (VIBRA-TABS) 100 MG tablet, Take 1 tablet (100 mg total) by mouth 2 (two) times daily. for 10 days, Disp: 20 tablet, Rfl: 0    Lab Results   Component Value Date    HGBA1C 5.2 08/22/2024    HGBA1C 5.2 09/06/2023    HGBA1C 5.4 12/21/2022     Lab Results   Component Value Date    MICALBCREAT 10.5 08/22/2024     Lab Results   Component Value Date    LDLCALC 65.4 08/22/2024    LDLCALC 67.2 09/06/2023    CHOL 128 08/22/2024    HDL 49 08/22/2024    TRIG 68 08/22/2024       Lab Results   Component Value Date     08/22/2024    K 4.0 08/22/2024     (H) 08/22/2024    CO2 24 08/22/2024    GLU 91 08/22/2024    BUN 10 08/22/2024    CREATININE 0.8 08/22/2024    CALCIUM 9.3 08/22/2024    PROT 6.9 08/22/2024    ALBUMIN 4.2 08/22/2024    BILITOT 0.9 08/22/2024    ALKPHOS 61 08/22/2024    AST 26 08/22/2024    ALT 25 08/22/2024    ANIONGAP 7 (L) 08/22/2024    ESTGFRAFRICA >60 01/24/2022    EGFRNONAA >60 01/24/2022    WBC 4.68 08/22/2024    HGB 15.0 08/22/2024    HGB 15.1 09/06/2023    HCT 45.4 08/22/2024    MCV 96 08/22/2024     08/22/2024    TSH 1.690 08/22/2024  "   HEPCAB Negative 2019       Lab Results   Component Value Date    TOTALTESTOST 477 2020    [id number] 43 2024    [id number] 45 2023    [id number] 485 2023         Past Medical History:   Diagnosis Date    Arthritis     BCC (basal cell carcinoma of skin) 2022    BPH (benign prostatic hyperplasia)     Hyperlipidemia     Hypertension      Past Surgical History:   Procedure Laterality Date    COLONOSCOPY  2006    CYSTOURETHROSCOPY  05/15/2019    HERNIA REPAIR  2017    umbilical, bilateral inguinal    HERNIA REPAIR      KNEE ARTHROSCOPY Left     left knee meniscus repair    PROSTATE SURGERY  10/25/2018    TRANSURETHRAL RESECTION OF PROSTATE  10/25/2018     Social History     Social History Narrative    Not on file     Family History   Problem Relation Name Age of Onset    Uterine cancer Mother Britney Guillen          at 78    Arthritis Mother Britney Guillen     Cancer Mother Britney Guillen     Coronary artery disease Father Jonathan Guillen     Heart attack Father Jonathan Guillen          at 72    Hypertension Father Jonathan Guillen     Cataracts Neg Hx      Glaucoma Neg Hx      Macular degeneration Neg Hx      Prostate cancer Neg Hx      Colon cancer Neg Hx       Vitals:    25 1049   BP: 110/70   Pulse: (!) 59   SpO2: 98%   Weight: 70.5 kg (155 lb 6.8 oz)   Height: 5' 6" (1.676 m)     Objective:   Physical Exam  Constitutional:       Appearance: Normal appearance.   HENT:      Head: Normocephalic.      Nose: Nose normal.   Eyes:      Comments: Redness, swelling and irritation of the right lower eyelid. Punctal occlusion visualized.   Neurological:      Mental Status: He is alert and oriented to person, place, and time. Mental status is at baseline.   Psychiatric:         Mood and Affect: Mood normal.         Behavior: Behavior normal.       Assessment:     1. Blepharitis of right lower eyelid, unspecified type      Plan:     Orders Placed This Encounter    " doxycycline (VIBRA-TABS) 100 MG tablet   - start doxycycline for 10 days given recurrence.  - educated patient on clogged tear duct  - will need eye lid cleaning and warm compresses.   - consider ophtho for tear duct clog probing if no improvement     There are no Patient Instructions on file for this visit.  All of your core healthy metrics are met.

## 2025-01-24 DIAGNOSIS — I70.0 AORTIC ATHEROSCLEROSIS: ICD-10-CM

## 2025-01-24 RX ORDER — ATORVASTATIN CALCIUM 40 MG/1
40 TABLET, FILM COATED ORAL DAILY
Qty: 90 TABLET | Refills: 2 | Status: SHIPPED | OUTPATIENT
Start: 2025-01-24

## 2025-01-24 NOTE — TELEPHONE ENCOUNTER
No care due was identified.  [facility] Embedded Care Due Messages. Reference number: [id number].   1/24/2025 9:10:29 AM CST

## 2025-01-24 NOTE — TELEPHONE ENCOUNTER
Refill Decision Note   Zion Guillen  is requesting a refill authorization.  Brief Assessment and Rationale for Refill:  Approve     Medication Therapy Plan:        Comments:     Note composed:11:49 AM 01/24/2025

## 2025-01-29 DIAGNOSIS — M17.0 BILATERAL PRIMARY OSTEOARTHRITIS OF KNEE: ICD-10-CM

## 2025-01-29 NOTE — TELEPHONE ENCOUNTER
No care due was identified.  [facility] Embedded Care Due Messages. Reference number: [id number].   1/29/2025 11:43:06 AM CST

## 2025-01-30 RX ORDER — DICLOFENAC SODIUM 10 MG/G
2 GEL TOPICAL 4 TIMES DAILY PRN
Qty: 100 G | Refills: 3 | Status: SHIPPED | OUTPATIENT
Start: 2025-01-30

## 2025-02-18 ENCOUNTER — OFFICE VISIT (OUTPATIENT)
Dept: INTERNAL MEDICINE | Facility: CLINIC | Age: 75
End: 2025-02-18
Payer: MEDICARE

## 2025-02-18 VITALS
HEIGHT: 66 IN | WEIGHT: 152.56 LBS | HEART RATE: 61 BPM | DIASTOLIC BLOOD PRESSURE: 60 MMHG | BODY MASS INDEX: 24.52 KG/M2 | OXYGEN SATURATION: 97 % | SYSTOLIC BLOOD PRESSURE: 112 MMHG

## 2025-02-18 DIAGNOSIS — I10 ESSENTIAL HYPERTENSION: ICD-10-CM

## 2025-02-18 DIAGNOSIS — R73.09 OTHER ABNORMAL GLUCOSE: ICD-10-CM

## 2025-02-18 DIAGNOSIS — E78.2 MIXED HYPERLIPIDEMIA: ICD-10-CM

## 2025-02-18 DIAGNOSIS — M54.50 CHRONIC BILATERAL LOW BACK PAIN WITHOUT SCIATICA: Primary | ICD-10-CM

## 2025-02-18 DIAGNOSIS — E55.9 VITAMIN D DEFICIENCY: ICD-10-CM

## 2025-02-18 DIAGNOSIS — Z00.00 HEALTH MAINTENANCE EXAMINATION: ICD-10-CM

## 2025-02-18 DIAGNOSIS — G89.29 CHRONIC BILATERAL LOW BACK PAIN WITHOUT SCIATICA: Primary | ICD-10-CM

## 2025-02-18 RX ORDER — METHOCARBAMOL 750 MG/1
750 TABLET, FILM COATED ORAL 3 TIMES DAILY PRN
Qty: 40 TABLET | Refills: 0 | Status: SHIPPED | OUTPATIENT
Start: 2025-02-18

## 2025-02-18 NOTE — PROGRESS NOTES
Subjective:       Patient ID: Zion Guillen is a 74 y.o. male.    Chief Complaint: Chronic bilateral low back pain without sciatica [M54.50, G89.29]    Patient is established with me, here today for the following:    HTN, HLD, abdominal aortic ectasia, vitamin D deficiency, environmental allergies, history of basal cell    History of Present Illness      BACK PAIN:  He reports chronic back pain that has persisted for years. Pain is most severe in the morning upon waking and while sitting for breakfast, with gradual improvement throughout the day. He uses a heating pad in the morning for relief. His exercise routine includes walking three miles and swimming. X-ray from August showed evidence of arthritis in the back.    ENT AND OCULAR:  He manages sinus symptoms with twice daily saline solution and reports occasional drainage and headaches due to nasal congestion. He had a previous eye infection requiring erythromycin treatment after symptom recurrence, and now maintains eye health with morning and evening cleaning routine.      ROS:  ENT: +nasal congestion  Musculoskeletal: +back pain  Neurological: +headache           Health maintenance -   Cologuard negative SEP2023.   Denies family history of colorectal cancer.  Family history of cardiac disease.  Denies family history of prostate cancer.  UTD on Tdap, COVID, PPSV23, PCV13, shingles, RSV vaccinations.  Due for COVID vaccination.  Started smoking at age early teens, at most 1 PPD. Stopped smoking around 1993.  Denies drug use.  Completed HIV and hepatitis C screening.  UTD on diabetes screening.  Lab Results       Component                Value               Date                       HGBA1C                   5.2                 08/22/2024               HTN -   Currently prescribed losartan and amlodipine.  Lab Results   Component Value Date    MICALBCREAT 10.5 08/22/2024     BP Readings from Last 5 Encounters:   02/18/25 112/60   01/16/25 110/70   12/10/24  "116/71   08/27/24 118/80   02/28/24 128/70      HLD -   Endorses taking atorvastatin as directed  Lab Results   Component Value Date    CHOL 128 08/22/2024     Lab Results   Component Value Date    TRIG 68 08/22/2024     Lab Results   Component Value Date    LDLCALC 65.4 08/22/2024     Lab Results   Component Value Date    HDL 49 08/22/2024        Vitamin D deficiency -  Lab Results   Component Value Date    UPGKITWN38XY 43 08/22/2024    [id number] 45 09/06/2023    [id number] 49 01/24/2022        Taking Xyzal and Singulair for allergies                   Using azelastine PRN for allergies     History of basal cell -  Following with Dr. Huang routinely for dermatology.     Abdominal aorta u/s AUG2023 showed "Ectasia of the abdominal aorta with no evidence for aneurysm."  Due for repeat u/s in AUG2026     Xray lumbar flex/ext AUG2024 "Mild disc space narrowing L5-S1. Facet arthropathy L4-5 and L5-S1."  Endorses low back pain that occurs when first wakes up in the morning  Pain improves/resolves once moving and physical active  Feels stiff with bending over to get dressed in the morning  Has been occurring chronically           Current Outpatient Medications   Medication Instructions    albuterol (VENTOLIN HFA) 90 mcg/actuation inhaler 2 puffs, Inhalation, Every 6 hours PRN, Rescue    amLODIPine (NORVASC) 10 mg, Oral, Daily    atorvastatin (LIPITOR) 40 mg, Oral, Daily    azelastine (ASTELIN) 274 mcg, Nasal, 2 times daily PRN    cholecalciferol, vitamin D3, (VITAMIN D3) 50 mcg (2,000 unit) Cap No dose, route, or frequency recorded.    clotrimazole-betamethasone 1-0.05% (LOTRISONE) cream Topical (Top), 2 times daily    diclofenac sodium (VOLTAREN) 2 g, Topical (Top), 4 times daily PRN    glucosamine-chondroitin 500-400 mg tablet 2 tablets, Daily    levocetirizine (XYZAL) 5 mg, Oral, Nightly    losartan (COZAAR) 25 mg, Oral, Daily    methocarbamoL (ROBAXIN) 750 mg, Oral, 3 times daily PRN    montelukast (SINGULAIR) 10 " "mg, Oral, Nightly PRN     Objective:      Vitals:    02/18/25 0834   BP: 112/60   Pulse: 61   SpO2: 97%   Weight: 69.2 kg (152 lb 8.9 oz)   Height: 5' 6" (1.676 m)   PainSc: 0-No pain     Body mass index is 24.62 kg/m².    Physical Exam  Vitals reviewed.   Constitutional:       General: He is not in acute distress.     Appearance: Normal appearance. He is not ill-appearing or diaphoretic.   HENT:      Head: Normocephalic and atraumatic.      Right Ear: Tympanic membrane, ear canal and external ear normal. There is no impacted cerumen.      Left Ear: Tympanic membrane, ear canal and external ear normal. There is no impacted cerumen.      Nose: Nose normal. No rhinorrhea.      Mouth/Throat:      Mouth: Mucous membranes are moist.      Pharynx: Oropharynx is clear. No oropharyngeal exudate or posterior oropharyngeal erythema.   Eyes:      General: No scleral icterus.        Right eye: No discharge.         Left eye: No discharge.      Conjunctiva/sclera: Conjunctivae normal.   Neck:      Thyroid: No thyromegaly or thyroid tenderness.      Trachea: Trachea normal.   Cardiovascular:      Rate and Rhythm: Normal rate and regular rhythm.      Heart sounds: Normal heart sounds. No murmur heard.     No friction rub. No gallop.   Pulmonary:      Effort: Pulmonary effort is normal. No respiratory distress.      Breath sounds: Normal breath sounds. No stridor. No wheezing, rhonchi or rales.   Musculoskeletal:      Cervical back: Neck supple.      Comments:   PARIS negative bilaterally  Straight leg raise negative bilaterally   Non-TTP upper gluteal region bilaterally  Non-TTP SI joint bilaterally  Non-TTP trochanteric bursa bilaterally  No pain with forward flexion  No pain with facet loading        Lymphadenopathy:      Head:      Right side of head: No submandibular or posterior auricular adenopathy.      Left side of head: No submandibular or posterior auricular adenopathy.      Cervical: No cervical adenopathy.      Right " cervical: No superficial, deep or posterior cervical adenopathy.     Left cervical: No superficial, deep or posterior cervical adenopathy.      Upper Body:      Right upper body: No supraclavicular adenopathy.      Left upper body: No supraclavicular adenopathy.   Skin:     General: Skin is warm and dry.      Comments: Color WNL   Neurological:      Mental Status: He is alert. Mental status is at baseline.   Psychiatric:         Mood and Affect: Mood normal.         Behavior: Behavior normal.         Assessment:       1. Chronic bilateral low back pain without sciatica    2. Essential hypertension    3. Health maintenance examination    4. Mixed hyperlipidemia    5. Other abnormal glucose    6. Vitamin D deficiency        Plan:   Chronic bilateral low back pain without sciatica  -     Ambulatory referral/consult to [facility]; Future; Expected date: 02/25/2025  -     methocarbamoL (ROBAXIN) 750 MG Tab; Take 1 tablet (750 mg total) by mouth 3 (three) times daily as needed (back pain).  Dispense: 40 tablet; Refill: 0    Essential hypertension  -     Comprehensive Metabolic Panel; Future; Expected date: 08/18/2025  -     Microalbumin/Creatinine Ratio, Urine; Future; Expected date: 08/18/2025  -     TSH; Future; Expected date: 08/18/2025  -     CBC Auto Differential; Future; Expected date: 08/18/2025    Health maintenance examination  -     Comprehensive Metabolic Panel; Future; Expected date: 08/18/2025  -     Microalbumin/Creatinine Ratio, Urine; Future; Expected date: 08/18/2025  -     TSH; Future; Expected date: 08/18/2025  -     Lipid Panel; Future; Expected date: 08/18/2025  -     Hemoglobin A1C; Future; Expected date: 08/18/2025  -     CBC Auto Differential; Future; Expected date: 08/18/2025  -     Vitamin D; Future; Expected date: 08/18/2025    Mixed hyperlipidemia  -     Lipid Panel; Future; Expected date: 08/18/2025    Other abnormal glucose  -     Hemoglobin A1C; Future; Expected date:  08/18/2025    Vitamin D deficiency  -     Vitamin D; Future; Expected date: 08/18/2025      Assessment & Plan    IMPRESSION:  - Assessed chronic back pain, likely exacerbated by arthritis and muscle spasms  - Recommend multimodal approach including heat therapy, stretching, and muscle relaxants to address pain and improve function  - Evaluated allergy symptoms, which appear to be well-controlled with current regimen  - Noted resolved eye infection following antibiotic treatment    BACK PAIN WITH ARTHRITIS:  - Explained benefits of combining heat therapy with muscle relaxants to improve stretching effectiveness.  - Advised on potential drowsiness as side effect of muscle relaxant, recommending evening trial first.  - Directed the patient to perform stretching exercises after heat application, both morning and evening.  - Prescribed methocarbamol (muscle relaxant) as needed, to be taken before applying heat and stretching.  - Referred to [facility] for comprehensive back pain management.  - Reviewed x-ray from August showing arthritis.  - Discussed importance of strengthening core and back muscles to support arthritic joints.    SINUS ISSUES:  - Continued Azelastin nasal spray as needed for sinus pressure or drainage, preferably at night.  - Noted sinus problems are under control, with occasional drainage and headaches.  - Advised continued use of saline solution morning and night for sinus management.    LABS:  - CBC, CMP ordered as routine labs to be completed before August annual visit.    FOLLOW UP:  - Noted patient's blood pressure under control.  - Zion to continue current exercise routine of walking and swimming.  - Follow up in August for annual visit.           Shoshana Richards MD  2/18/2025

## 2025-02-18 NOTE — PATIENT INSTRUCTIONS
Stretch at least 2 times per day for the next 1-2 weeks.  Sit with heating pad on painful areas for 20 minutes prior to stretching.  May also take muscle relaxer prior to applying heat if able.   Avoid stretches that cause overt pain.   Avoid sedating substances such as alcohol or antihistamines with muscles relaxers.   Avoid activities that could be dangerous, such as driving, when taking muscle relaxer.

## 2025-02-20 DIAGNOSIS — M17.0 BILATERAL PRIMARY OSTEOARTHRITIS OF KNEE: ICD-10-CM

## 2025-02-20 RX ORDER — DICLOFENAC SODIUM 10 MG/G
2 GEL TOPICAL 4 TIMES DAILY PRN
Qty: 100 G | Refills: 3 | Status: SHIPPED | OUTPATIENT
Start: 2025-02-20

## 2025-02-20 NOTE — TELEPHONE ENCOUNTER
No care due was identified.  [facility] Embedded Care Due Messages. Reference number: [id number].   2/20/2025 10:07:54 AM CST

## 2025-02-22 DIAGNOSIS — Z00.00 ENCOUNTER FOR MEDICARE ANNUAL WELLNESS EXAM: ICD-10-CM

## 2025-02-25 ENCOUNTER — CLINICAL SUPPORT (OUTPATIENT)
Dept: REHABILITATION | Facility: OTHER | Age: 75
End: 2025-02-25
Payer: MEDICARE

## 2025-02-25 DIAGNOSIS — M54.50 CHRONIC BILATERAL LOW BACK PAIN WITHOUT SCIATICA: ICD-10-CM

## 2025-02-25 DIAGNOSIS — M53.86 DECREASED ROM OF INTERVERTEBRAL DISCS OF LUMBAR SPINE: Primary | ICD-10-CM

## 2025-02-25 DIAGNOSIS — G89.29 CHRONIC BILATERAL LOW BACK PAIN WITHOUT SCIATICA: ICD-10-CM

## 2025-02-25 DIAGNOSIS — R68.89 DECREASED FUNCTIONAL ACTIVITY TOLERANCE: ICD-10-CM

## 2025-02-25 DIAGNOSIS — R29.898 DECREASED STRENGTH OF TRUNK AND BACK: ICD-10-CM

## 2025-02-25 PROCEDURE — 97161 PT EVAL LOW COMPLEX 20 MIN: CPT | Mod: HCNC

## 2025-02-25 PROCEDURE — 97110 THERAPEUTIC EXERCISES: CPT | Mod: HCNC

## 2025-02-25 NOTE — PROGRESS NOTES
Outpatient Rehab    Physical Therapy Evaluation    Patient Name: Zion Guillen  MRN: [id number]  YOB: 1950  Encounter Date: 2/25/2025    Therapy Diagnosis:   Encounter Diagnoses   Name Primary?    Chronic bilateral low back pain without sciatica     Decreased ROM of intervertebral discs of lumbar spine Yes    Decreased strength of trunk and back     Decreased functional activity tolerance      Physician: Shoshana Richards MD    Physician Orders: Eval and Treat  Medical Diagnosis: M54.50,G89.29 (ICD-10-CM) - Chronic bilateral low back pain without sciatica     Visit # / Visits Authorized:  1 / 1   Date of Evaluation:  2/25/2025   Insurance Authorization Period: 2/182025 to 2/18/2025  Plan of Care Certification:  2/25/2025 to 4/7/2025      Time In: 0210 2:10  Time Out: 0255  Total Time: 45   Total Billable Time: 45    INSURANCE and OUTCOMES: Fee for Service with FOTO Outcomes 1/3    Precautions: standard    Pattern of pain determined: 1 mobility     Intake Outcome Measure for FOTO Survey    Therapist reviewed FOTO scores for Zion Guillen on 2/25/2025.   FOTO report - see Media section or FOTO account episode details.     Intake Score: 59 (Goal 67)%           Subjective   History of Present Illness  Zion is a 74 y.o. male who reports to physical therapy with a chief concern of Low back pain.     The patient reports a medical diagnosis of M54.50,G89.29 (ICD-10-CM) - Chronic bilateral low back pain without sciatica.            History of Present Condition/Illness: The pain is mostly when he gets up in the morning and when sitting for breakfast, when he goes to stand up it's becoming more and more difficult. Through he day he doesn't think about the back but at the end of the day he feels it again. He exercises quite a bit, walking 3 miles, 3 days a week with wife and dog. Swim 1k about 3 days a week. The older he gets he notices the back more and it's getting harder and harder to bend down.  Standing staic for a while (30 min) or shopping he has more and more difficulty. Uses diclofenac for the knees. Notices arthritis in the hands and knees. Piror to walking and swimming he does some general movements but he doen't have a specific program. He was given a musle relaxer last week and he has been taking half as needed. Will also do the rowing machine.          Per MD:   BACK PAIN:  He reports chronic back pain that has persisted for years. Pain is most severe in the morning upon waking and while sitting for breakfast, with gradual improvement throughout the day. He uses a heating pad in the morning for relief. His exercise routine includes walking three miles and swimming. X-ray from August showed evidence of arthritis in the back.    Imaging:  EXAMINATION:  XR LUMBAR SPINE AP AND LAT WITH FLEX/EXT     CLINICAL HISTORY:  Low back pain, unspecified     TECHNIQUE:  AP and lateral views as well as lateral flexion and extension images are performed through the lumbar spine.     COMPARISON:  None     FINDINGS:  Lumbar vertebral body heights are maintained.     Mild disc space narrowing L5-S1.  Facet arthropathy L4-5 and L5-S1.     AP alignment is anatomic.     Aortic atherosclerosis.     Impression:     No acute osseous abnormality seen.  Degenerative change lower lumbar spine.    Prior Therapy: Prior meniscus surgery  Prior Treatment: none  Social History:  lives with their spouse, steps inside with rail, at the end of the day it can be somewhat more difficulty   Occupation: Retired - Professor at [employer]  Leisure: Walking,       Prior Level of Function: I wash ADL  Current Level of Function: Minimal discomfort with LE dressing, Increase discomfort in the back with cleaning  DME owned/used: no  Gym Membership: yes, [facility], [place]    Pain:  Current 1/10, worst 4/10, best 0/10   Location: central bilateral low back   Description: varies; stiff, sharp, aching  Aggravating Factors: Sitting, Standing, Night Time,  Morning, and Getting out of bed/chair  Easing Factors: change positions, rest, muscle relaxer, heating pad  Disturbed Sleep: no    Pattern of pain questions:  1.  Where is your pain the worst? Low back  2.  Is your pain constant or intermittent? intermittent  3.  Does bending forward make your typical pain worse? yes  4.  Since the start of your back pain, has there been a change in your bowel or bladder? no  5.  What can't you do now that you use to be able to do? Sitting, Standing, Night Time, Morning, and Getting out of bed/chair    Personal Goals:To have a better back, to have a normal back without having to think about it    Red Flag Screening:   Cough/Sneeze Strain: (--)  Bladder/bowel: (--)  Falls: (--)  Night pain: (--)  Unexplained weight loss: (--)  General health: Good     Past Medical History/Physical Systems Review:   Zion Guillen  has a past medical history of Arthritis, BCC (basal cell carcinoma of skin), BPH (benign prostatic hyperplasia), Hyperlipidemia, and Hypertension.    Zion Guillen  has a past surgical history that includes Hernia repair (11/29/2017); Prostate surgery (10/25/2018); Colonoscopy (2006); Knee arthroscopy (Left, 2011); Hernia repair (1962); Cystourethroscopy (05/15/2019); and Transurethral resection of prostate (10/25/2018).    Zion has a current medication list which includes the following prescription(s): albuterol, amlodipine, atorvastatin, azelastine, cholecalciferol (vitamin d3), clotrimazole-betamethasone 1-0.05%, diclofenac sodium, glucosamine-chondroitin, levocetirizine, losartan, methocarbamol, and montelukast.    Review of patient's allergies indicates:  No Known Allergies     Objective            Postural examination/scapula alignment: R shoulder elevated  Joint integrity: Hard end feel, Tenderness at L3-L5  Skin integrity:WNL   Edema: None  Correction of posture: NT  Sitting: fair  Standing: fair    GAIT:  Assistive Device used: none  Level of Assistance:  independent  Patient displays the following gait deviations: no gait deviations observed.    MOVEMENT LOSS - Lumbar   Norms ROM Loss Initial   Flexion Fingers touch toes, sacral angle >/= 70 deg, uniform spinal curvature, posterior weight shift  major loss   Extension ASIS surpasses toes, spine of scapulae surpasses heels, uniform spinal curve moderate loss   Side glide Right  moderate loss   Side glide Left  moderate loss   Rotation Right PT observes contralateral shoulder minimal loss   Rotation Left PT observes contralateral shoulder moderate loss     Lower Extremity Strength  Right LE  Left LE    Hip flexion: 4-/5 Hip flexion: 4+/5   Hip extension:  4/5 Hip extension: 3/5   Hip abduction: 4+/5 Hip abduction: 4+/5   Hip adduction:  4+/5 Hip adduction:  4+/5   Knee Flexion 4+/5 Knee Flexion 4+/5   Knee Extension 4+/5 Knee Extension 4+/5   Ankle dorsiflexion: 5/5 Ankle dorsiflexion: 4/5   Ankle plantarflexion: 5/5 Ankle plantarflexion: 4/5     Decreased hamstring length  Decreased mobility at R hip flexor    Special Tests:   Test Name  Test Result   Prone Instability Test inconclusive   SI Joint Provocation Test (--)   Thigh thrust (--)   Sacral Thrust (--)   Straight Leg Raise (--)   Neural Tension Test (--)   Crossed Straight Leg Raise (--)     NEUROLOGICAL SCREENING:     Sensory deficits:  Intact to light touch    Reflexes:    Left Right   Patella Tendon 2+ 2+   Clonus (--) (--)     REPEATED TEST MOVEMENTS:    Baseline symptoms:  Repeated Flexion in Standing no effect some increased knee discomfort   Repeated Extension in Standing no effect   Repeated Flexion in lying no effect   Repeated Extension in lying  no effect       STATIC TESTS and other movements:   Prone lie no effect   Prone lie on elbows no effect   Sitting slouched  no effect   Sitting erect no effect       Lumbar testing Visit 2   Treatment:  Therapeutic Exercise  Therapeutic Exercise Activity 1: Warm up next visit  Therapeutic Exercise Activity  2: Lower Trunk Rotation x10  Therapeutic Exercise Activity 3: Double Knee to Chest x10  Therapeutic Exercise Activity 4: Extension in Lying x10  Therapeutic Exercise Activity 5: Extension in standing x10  Therapeutic Exercise Activity 6: Open book stretch x10       Therapeutic Education/Activity provided for 10 minutes:   - Patient was given an [facility] Healthy Back Visit 1 handout which discusses the following:  - what to expect in therapy  - an overview of the program, including health coaching and wellness  - importance of spinal hygiene, proper posture, lifting mechanics, sleep quality, and nutrition/hydration   - Alyssa roll information was provided.  - Patient received a handout regarding anticipated muscular soreness following the isometric test and strategies for management were reviewed with patient including stretching, using ice and scheduled rest.   - Patient received verbal education on the following:   - Healthy Back program,   - purpose of the isometric test,   - safe progression of neck strengthening, wellness approach, and systemic strengthening.   - safe usage of MedX machine and testing protocols.    Assessment & Plan   Assessment  Zion presents with a condition of Low complexity.   Presentation of Symptoms: Stable       Functional Limitations: Activity tolerance, Carrying objects, Functional mobility, Range of motion, Standing tolerance, Transfers  Impairments: Abnormal or restricted range of motion, Lack of appropriate home exercise program    Patient Goal for Therapy (PT): Increase limited activity tolerance  Prognosis: Excellent  Prognosis Details: Zion presents to therapy with complaints of Chronic bilateral low back pain without sciatica that causes difficulty with static standing, morning and evening mobility, transfers, and household chores. The evaluation shows decrease in cardinal plane mobility with decreased hamstring length, negative neurological findings, and decreased  strength findings though functional. Pt appears to have general movement preference and would benefit from exercises to improve pain free movement and functional activity. Zion has been given initial HEP and will also benefit from strength and stability exercises as they progress through the program.  Plan to complete Lumbar MedX testing on visit 2.     Plan  From a physical therapy perspective, the patient would benefit from: Skilled Rehab Services    Planned therapy interventions include: Therapeutic exercise, Therapeutic activities, Neuromuscular re-education, Manual therapy, and ADLs/IADLs.    Planned modalities to include: Biofeedback, Cryotherapy (cold pack), Electrical stimulation - attended, Electrical stimulation - passive/unattended, Thermotherapy (hot pack), and Other (Comment). Dry Needling      Visit Frequency: 2 times Per Week for 10 Weeks.       This plan was discussed with Patient.   Discussion participants: Agreed Upon Plan of Care  Plan details: Outpatient physical therapy 2x week for 10 weeks or 20 visits to include the following:  - Patient education - Therapeutic exercise - Manual therapy - Performance testing  - Neuromuscular Re-education - Therapeutic activity  - Modalities   Pt may be seen by PTA as part of the rehabilitation team.           Patient's spiritual, cultural, and educational needs considered and patient agreeable to plan of care and goals.     Education  Education was done with Patient. The patient's learning style includes Listening and Demonstration. The patient Demonstrates understanding and Verbalizes understanding.                 Goals:   Active       Long term goals: 10 weeks or 20 visits        Pt will demonstrate increased lumbar ROM by at least 6 degrees from initial ROM value, resulting in improved ability to perform functional forward bending while standing and sitting.       Start:  02/25/25    Expected End:  05/06/25            Pt will demonstrate increased MedX  average isometric strength value by 35% from initial test resulting in improved ability to perform bending, lifting, and carrying activities safely and confidently.       Start:  02/25/25    Expected End:  05/06/25            Pt to demonstrate ability to independently control and reduce their pain through posture positioning and mechanical movements throughout a typical day.       Start:  02/25/25    Expected End:  05/06/25            Pt will demonstrate reduced pain and improved functional outcomes as reported on the FOTO by reaching an intake score of >/= 67% functional ability in order to demonstrate subjective improvement in patient's condition. .       Start:  02/25/25    Expected End:  05/06/25            Pt will be able to complete sit to stand transfers without increased pain in the low back       Start:  02/25/25    Expected End:  05/06/25               Long term goals: 10 weeks or 20 visits        Pt will be able to stand up to 30+ min without increase in low back pain       Start:  02/25/25    Expected End:  05/06/25            Pt will demonstrate independence with the HEP at discharge.       Start:  02/25/25    Expected End:  05/06/25               Short term goals:  6 weeks or 10 visits        Pt will demonstrate increased lumbar ROM by at least 3 degrees from the initial ROM value with improvements noted in functional ROM and ability to perform ADLs.       Start:  02/25/25    Expected End:  04/08/25            Pt will demonstrate increased MedX average isometric strength value by 20% from initial test resulting in improved ability to perform bending, lifting, and carrying activities safely, confidently.       Start:  02/25/25    Expected End:  04/08/25            Pt will report a reduction in worst pain score by 1-2 points for improved tolerance for morning transfers.       Start:  02/25/25    Expected End:  04/08/25            Pt able to perform HEP correctly with minimal cueing or supervision from  therapist to encourage independent management of symptoms.       Start:  02/25/25    Expected End:  04/08/25                Margarita Ndiaye PT

## 2025-02-26 ENCOUNTER — OFFICE VISIT (OUTPATIENT)
Dept: OPHTHALMOLOGY | Facility: CLINIC | Age: 75
End: 2025-02-26
Attending: OPHTHALMOLOGY
Payer: MEDICARE

## 2025-02-26 DIAGNOSIS — H25.13 NUCLEAR SCLEROSIS, BILATERAL: Primary | ICD-10-CM

## 2025-02-26 PROCEDURE — 1101F PT FALLS ASSESS-DOCD LE1/YR: CPT | Mod: HCNC,CPTII,S$GLB, | Performed by: OPHTHALMOLOGY

## 2025-02-26 PROCEDURE — 92014 COMPRE OPH EXAM EST PT 1/>: CPT | Mod: HCNC,S$GLB,, | Performed by: OPHTHALMOLOGY

## 2025-02-26 PROCEDURE — 1159F MED LIST DOCD IN RCRD: CPT | Mod: HCNC,CPTII,S$GLB, | Performed by: OPHTHALMOLOGY

## 2025-02-26 PROCEDURE — 3288F FALL RISK ASSESSMENT DOCD: CPT | Mod: HCNC,CPTII,S$GLB, | Performed by: OPHTHALMOLOGY

## 2025-02-26 PROCEDURE — 99999 PR PBB SHADOW E&M-EST. PATIENT-LVL III: CPT | Mod: PBBFAC,HCNC,, | Performed by: OPHTHALMOLOGY

## 2025-02-26 PROCEDURE — 1126F AMNT PAIN NOTED NONE PRSNT: CPT | Mod: HCNC,CPTII,S$GLB, | Performed by: OPHTHALMOLOGY

## 2025-02-26 NOTE — PROGRESS NOTES
HPI    73 y/o male present today for Cataract Evaluation   Patient state blurry [facility] OU.   Ready for SX       Eye Meds: OTC Allergy Drops PRN OU  Last edited by Jeanie Wharton on 2/26/2025  2:11 PM.            Assessment /Plan     For exam results, see Encounter Report.    Nuclear sclerosis, bilateral      Incipient cataract: Patient does reports mild visual decline, but not sufficient to affect activities of daily living. I recommend monitoring visual status and follow up when visual symptoms worsen.  Diboo 16.5//17.0    Try new MRx first, may need phaco OU if not satisfied

## 2025-02-27 ENCOUNTER — CLINICAL SUPPORT (OUTPATIENT)
Dept: REHABILITATION | Facility: OTHER | Age: 75
End: 2025-02-27
Payer: MEDICARE

## 2025-02-27 DIAGNOSIS — M53.86 DECREASED ROM OF INTERVERTEBRAL DISCS OF LUMBAR SPINE: Primary | ICD-10-CM

## 2025-02-27 DIAGNOSIS — R29.898 DECREASED STRENGTH OF TRUNK AND BACK: ICD-10-CM

## 2025-02-27 DIAGNOSIS — R68.89 DECREASED FUNCTIONAL ACTIVITY TOLERANCE: ICD-10-CM

## 2025-02-27 PROCEDURE — 97110 THERAPEUTIC EXERCISES: CPT | Mod: HCNC

## 2025-02-27 PROCEDURE — 97112 NEUROMUSCULAR REEDUCATION: CPT | Mod: HCNC

## 2025-02-27 NOTE — PROGRESS NOTES
Outpatient Rehab    Physical Therapy Visit    Patient Name: Zion Guillen  MRN: [id number]  YOB: 1950  Encounter Date: 2/27/2025    Therapy Diagnosis:   Encounter Diagnoses   Name Primary?    Decreased ROM of intervertebral discs of lumbar spine Yes    Decreased strength of trunk and back     Decreased functional activity tolerance      Physician: Shoshana Richards MD    Physician Orders: Eval and Treat  Medical Diagnosis: M54.50,G89.29 (ICD-10-CM) - Chronic bilateral low back pain without sciatica     Visit # / Visits Authorized:  1 / 1   Date of Evaluation:  2/25/2025   Insurance Authorization Period: 2/25/2025 to 4/27/2025  Plan of Care Certification:  2/25/2025 to 4/7/2025      Time In: 1230   Time Out: 1330  Total Time: 60   Total Billable Time: 55    PTA visits: 0/5    INSURANCE and OUTCOMES: Fee for Service with FOTO Outcomes 1/3     Precautions: standard     Pattern of pain determined: 1 mobility     FOTO:  Intake Score:  %  Survey Score 1:  %  Survey Score 2:  %         Subjective   Zion reports no complaints of low back pain but does have questions about the stretches.  Pain reported as 0/10.      Objective            Isometric Testing on Med X equipment: Testing administered by PT    Test Initial Midpoint Final   Spine area: Lumbar      Date 2/27/2025     ROM 0-51 deg     Max Peak Torque 120      Min Peak Torque 50      Flex/Ext Ratio 2.4:1     % below normative data 57     % gain from initial test Not available visit 1        Treatment:  Therapeutic Exercise  Therapeutic Exercise Activity 1: Warm up next - Bike  Therapeutic Exercise Activity 2: Lower Trunk Rotation x10  Therapeutic Exercise Activity 3: Double Knee to Chest x10  Therapeutic Exercise Activity 4: Extension in Lying x10  Therapeutic Exercise Activity 5: Extension in standing x10  Therapeutic Exercise Activity 6: Open book stretch x10         MedX testing: Patient  received neuromuscular education to engage spinal  musculature correctly for motor control and engagement of musculature for 15 minutes including the MedX exercise component and practice and standard testing. MedX dynamic exercise and baseline isometric test performed with instructions to guide the patient safely through the testing procedure. Patient instructed to perform isometric test correctly and safely while building to an optimal force with a pain-free effort. Patient also instructed that they should feel support/pressure from MedX restraints but no pain/discomfort, and encouraged to report any pain to therapist. Patient demonstrated appropriate understanding of information and tolerance of test.  Education regarding purpose of test, safety during test given, and reviewed possible more soreness and strategies.             2/27/2025     1:05 PM   [facility]   Visit Number 2   VAS Pain Rating 0   Time 5   Extension in Lying 10   Extension in Standing 10   Flexion in Lying 10   Lumbar Extension Seat Pad 1   Femur Restraint 5   Top Dead Center 24   Counterweight 312   Lumbar Flexion 51   Lumbar Extension 0   Lumbar Peak Torque 120 ft. lbs.   Min Torque 50   Test Percent Below Normative Data 57 %         Assessment & Plan   Assessment: Zion presents to second healthy back visit reporting no complaints of low back pain but does have some questions about the streches. Continued with HEP stretches and pt was able to complete with moderate cues for hand and foot position. Pt completed lumbar MedX testing and is 57% below averages. Pt was also able to complete half of the peripheral strengthening exercises without increased discomfort and will complete the complete circuit next visit as tolerated.  Evaluation/Treatment Tolerance: Patient tolerated treatment well    Patient will continue to benefit from skilled outpatient physical therapy to address the deficits listed in the problem list box on initial evaluation, provide pt/family education and to maximize  pt's level of independence in the home and community environment.     Patient's spiritual, cultural, and educational needs considered and patient agreeable to plan of care and goals.           Plan: Continue with established Plan of Care towards established PT goals.    Goals:   Active       Long term goals: 10 weeks or 20 visits        Pt will demonstrate increased lumbar ROM by at least 6 degrees from initial ROM value, resulting in improved ability to perform functional forward bending while standing and sitting. (Progressing)       Start:  02/25/25    Expected End:  05/06/25            Pt will demonstrate increased MedX average isometric strength value by 35% from initial test resulting in improved ability to perform bending, lifting, and carrying activities safely and confidently. (Progressing)       Start:  02/25/25    Expected End:  05/06/25            Pt to demonstrate ability to independently control and reduce their pain through posture positioning and mechanical movements throughout a typical day. (Progressing)       Start:  02/25/25    Expected End:  05/06/25            Pt will demonstrate reduced pain and improved functional outcomes as reported on the FOTO by reaching an intake score of >/= 67% functional ability in order to demonstrate subjective improvement in patient's condition. . (Progressing)       Start:  02/25/25    Expected End:  05/06/25            Pt will be able to complete sit to stand transfers without increased pain in the low back (Progressing)       Start:  02/25/25    Expected End:  05/06/25               Long term goals: 10 weeks or 20 visits        Pt will be able to stand up to 30+ min without increase in low back pain (Progressing)       Start:  02/25/25    Expected End:  05/06/25            Pt will demonstrate independence with the HEP at discharge. (Progressing)       Start:  02/25/25    Expected End:  05/06/25               Short term goals:  6 weeks or 10 visits        Pt will  demonstrate increased lumbar ROM by at least 3 degrees from the initial ROM value with improvements noted in functional ROM and ability to perform ADLs. (Progressing)       Start:  02/25/25    Expected End:  04/08/25            Pt will demonstrate increased MedX average isometric strength value by 20% from initial test resulting in improved ability to perform bending, lifting, and carrying activities safely, confidently. (Progressing)       Start:  02/25/25    Expected End:  04/08/25            Pt will report a reduction in worst pain score by 1-2 points for improved tolerance for morning transfers. (Progressing)       Start:  02/25/25    Expected End:  04/08/25            Pt able to perform HEP correctly with minimal cueing or supervision from therapist to encourage independent management of symptoms. (Progressing)       Start:  02/25/25    Expected End:  04/08/25                Margarita Ndiaye, PT

## 2025-03-02 DIAGNOSIS — I10 ESSENTIAL HYPERTENSION: ICD-10-CM

## 2025-03-02 DIAGNOSIS — Z91.09 ENVIRONMENTAL ALLERGIES: ICD-10-CM

## 2025-03-02 NOTE — TELEPHONE ENCOUNTER
No care due was identified.  [facility] Embedded Care Due Messages. Reference number: [id number].   3/02/2025 7:38:45 AM CST

## 2025-03-03 ENCOUNTER — CLINICAL SUPPORT (OUTPATIENT)
Dept: REHABILITATION | Facility: OTHER | Age: 75
End: 2025-03-03
Payer: MEDICARE

## 2025-03-03 DIAGNOSIS — M53.86 DECREASED ROM OF INTERVERTEBRAL DISCS OF LUMBAR SPINE: Primary | ICD-10-CM

## 2025-03-03 DIAGNOSIS — R68.89 DECREASED FUNCTIONAL ACTIVITY TOLERANCE: ICD-10-CM

## 2025-03-03 DIAGNOSIS — R29.898 DECREASED STRENGTH OF TRUNK AND BACK: ICD-10-CM

## 2025-03-03 PROCEDURE — 97112 NEUROMUSCULAR REEDUCATION: CPT | Mod: HCNC,CQ

## 2025-03-03 PROCEDURE — 97110 THERAPEUTIC EXERCISES: CPT | Mod: HCNC,CQ

## 2025-03-03 RX ORDER — LEVOCETIRIZINE DIHYDROCHLORIDE 5 MG/1
5 TABLET, FILM COATED ORAL NIGHTLY
Qty: 90 TABLET | Refills: 3 | Status: SHIPPED | OUTPATIENT
Start: 2025-03-03

## 2025-03-03 RX ORDER — LOSARTAN POTASSIUM 25 MG/1
25 TABLET ORAL DAILY
Qty: 90 TABLET | Refills: 2 | Status: SHIPPED | OUTPATIENT
Start: 2025-03-03

## 2025-03-03 NOTE — TELEPHONE ENCOUNTER
No care due was identified.  [facility] Embedded Care Due Messages. Reference number: [id number].   3/02/2025 9:12:14 PM CST

## 2025-03-03 NOTE — PROGRESS NOTES
Outpatient Rehab    Physical Therapy Visit    Patient Name: Zion Guillen  MRN: [id number]  YOB: 1950  Encounter Date: 3/3/2025    Therapy Diagnosis:   Encounter Diagnoses   Name Primary?    Decreased ROM of intervertebral discs of lumbar spine Yes    Decreased strength of trunk and back     Decreased functional activity tolerance        Physician: Shoshana Richards MD    Physician Orders: Eval and Treat  Medical Diagnosis: M54.50,G89.29 (ICD-10-CM) - Chronic bilateral low back pain without sciatica     Visit # / Visits Authorized:  3 / 20  Date of Evaluation:  2/25/2025   Insurance Authorization Period: 2/25/2025 to 4/27/2025  Plan of Care Certification:  2/25/2025 to 4/7/2025      Time In: 0930    Time Out:  1030  Total Time:   60  Total Billable Time: 55    PTA visits: 1/5    INSURANCE and OUTCOMES: Fee for Service with FOTO Outcomes 1/3     Precautions: standard     Pattern of pain determined: 1 mobility            Subjective   Zion reports no complaints of LBP and feels the HEP stretches are helping manage symptoms..         Objective       Intake Outcome Measure for FOTO Survey     Therapist reviewed FOTO scores for Zion Guillen on 2/25/2025.   FOTO report - see Media section or FOTO account episode details.      Intake Score: 59 (Goal 67)%     Isometric Testing on Med X equipment: Testing administered by PT    Test Initial Midpoint Final   Spine area: Lumbar      Date 2/27/2025     ROM 0-51 deg     Max Peak Torque 120      Min Peak Torque 50      Flex/Ext Ratio 2.4:1     % below normative data 57     % gain from initial test Not available visit 1        Treatment:  Therapeutic Exercise  Therapeutic Exercise Activity 1: Warm up next - Bike  Therapeutic Exercise Activity 2: Lower Trunk Rotation x10  Therapeutic Exercise Activity 3: Double Knee to Chest x10  Therapeutic Exercise Activity 4: Extension in Lying x10  Therapeutic Exercise Activity 5: Extension in standing x10  Therapeutic  Exercise Activity 6: Open book stretch x10  Therapeutic Exercise Activity 10: Peripheral muscle strengthening which included one set of 15-20 repetitions focused on strengthening supporting musculature in order to improve body mechanics and functional mobility. Targeted muscle group machines:Torso rotation, Leg Ext, Leg Curl, Chest Press, Rowing  Triceps, Biceps, Hip Abd, Hip Add, and Leg Press    Balance/Neuromuscular Re-Education  Balance/Neuromuscular Re-Education Activity 1: Lumbar MedX exercise :  Patient received neuromuscular education for 10 minutes via participation on the Medical MedX Machine. Therapist assisted patient in isolating and engaging spinal stabilization musculature in order to improve functional ability and postural control. Patient performed exercise with therapist guidance in order to accurately use pacer function, avoid valsalva, and optimally exert effort within a safe and effective range via the Fransico Exertion Rating Scale. Patient instructed to perform at a midrange of exertion and to complete 15-20 repetitions within appropriate split time, with proper technique, and while maintaining safety.              3/3/2025     9:58 AM   [facility]   Visit Number 3   VAS Pain Rating 0   Speed 5 mph   Lumbar Stretches - Slouch Overcorrection 10   Extension in Lying 10   Flexion in Lying 10   Lumbar Weight 60 lbs   Repetitions 15   Rating of Perceived Exertion 7   Ice - Z Lie (in min.) 5       Assessment & Plan   Assessment:         Patient will continue to benefit from skilled outpatient physical therapy to address the deficits listed in the problem list box on initial evaluation, provide pt/family education and to maximize pt's level of independence in the home and community environment.     Patient's spiritual, cultural, and educational needs considered and patient agreeable to plan of care and goals.           Plan:      Goals:   Active       Long term goals: 10 weeks or 20 visits         Pt will demonstrate increased lumbar ROM by at least 6 degrees from initial ROM value, resulting in improved ability to perform functional forward bending while standing and sitting. (Progressing)       Start:  02/25/25    Expected End:  05/06/25            Pt will demonstrate increased MedX average isometric strength value by 35% from initial test resulting in improved ability to perform bending, lifting, and carrying activities safely and confidently. (Progressing)       Start:  02/25/25    Expected End:  05/06/25            Pt to demonstrate ability to independently control and reduce their pain through posture positioning and mechanical movements throughout a typical day. (Progressing)       Start:  02/25/25    Expected End:  05/06/25            Pt will demonstrate reduced pain and improved functional outcomes as reported on the FOTO by reaching an intake score of >/= 67% functional ability in order to demonstrate subjective improvement in patient's condition. . (Progressing)       Start:  02/25/25    Expected End:  05/06/25            Pt will be able to complete sit to stand transfers without increased pain in the low back (Progressing)       Start:  02/25/25    Expected End:  05/06/25               Long term goals: 10 weeks or 20 visits        Pt will be able to stand up to 30+ min without increase in low back pain (Progressing)       Start:  02/25/25    Expected End:  05/06/25            Pt will demonstrate independence with the HEP at discharge. (Progressing)       Start:  02/25/25    Expected End:  05/06/25               Short term goals:  6 weeks or 10 visits        Pt will demonstrate increased lumbar ROM by at least 3 degrees from the initial ROM value with improvements noted in functional ROM and ability to perform ADLs. (Progressing)       Start:  02/25/25    Expected End:  04/08/25            Pt will demonstrate increased MedX average isometric strength value by 20% from initial test resulting in  improved ability to perform bending, lifting, and carrying activities safely, confidently. (Progressing)       Start:  02/25/25    Expected End:  04/08/25            Pt will report a reduction in worst pain score by 1-2 points for improved tolerance for morning transfers. (Progressing)       Start:  02/25/25    Expected End:  04/08/25            Pt able to perform HEP correctly with minimal cueing or supervision from therapist to encourage independent management of symptoms. (Progressing)       Start:  02/25/25    Expected End:  04/08/25                  Leslye Carreon, PTA

## 2025-03-06 ENCOUNTER — CLINICAL SUPPORT (OUTPATIENT)
Dept: REHABILITATION | Facility: OTHER | Age: 75
End: 2025-03-06
Payer: MEDICARE

## 2025-03-06 DIAGNOSIS — M53.86 DECREASED ROM OF INTERVERTEBRAL DISCS OF LUMBAR SPINE: Primary | ICD-10-CM

## 2025-03-06 DIAGNOSIS — R29.898 DECREASED STRENGTH OF TRUNK AND BACK: ICD-10-CM

## 2025-03-06 DIAGNOSIS — R68.89 DECREASED FUNCTIONAL ACTIVITY TOLERANCE: ICD-10-CM

## 2025-03-06 PROCEDURE — 97110 THERAPEUTIC EXERCISES: CPT | Mod: HCNC

## 2025-03-06 PROCEDURE — 97112 NEUROMUSCULAR REEDUCATION: CPT | Mod: HCNC

## 2025-03-06 NOTE — PROGRESS NOTES
Outpatient Rehab    Physical Therapy Visit    Patient Name: Zion Guillen  MRN: [id number]  YOB: 1950  Encounter Date: 3/6/2025    Therapy Diagnosis:   Encounter Diagnoses   Name Primary?    Decreased ROM of intervertebral discs of lumbar spine Yes    Decreased strength of trunk and back     Decreased functional activity tolerance        Physician: Shoshana Richards MD    Physician Orders: Eval and Treat  Medical Diagnosis: M54.50,G89.29 (ICD-10-CM) - Chronic bilateral low back pain without sciatica     Visit # / Visits Authorized:  3 / 20  Date of Evaluation:  2/25/2025   Insurance Authorization Period: 2/25/2025 to 4/27/2025  Plan of Care Certification:  2/25/2025 to 4/7/2025      Time In: 0855   Time Out: 1000  Total Time: 65   Total Billable Time: 60    PTA visits: 0/5    INSURANCE and OUTCOMES: Fee for Service with FOTO Outcomes 1/3     Precautions: standard     Pattern of pain determined: 1 mobility            Subjective   Zion reports no pain and states he is feeling better..  Pain reported as 0/10.      Peripheral muscle strengthening which included one set of 15-20 repetitions focused on strengthening supporting musculature in order to improve body mechanics and functional mobility. Targeted muscle group machines:Torso rotation, Leg Ext, Leg Curl, Chest Press, Rowing Triceps, Biceps, Hip Abd, Hip Add, and Leg Press     Objective       Intake Outcome Measure for FOTO Survey     Therapist reviewed FOTO scores for Zion Guillen on 2/25/2025.   FOTO report - see Media section or FOTO account episode details.      Intake Score: 59 (Goal 67)%     Isometric Testing on Med X equipment: Testing administered by PT    Test Initial Midpoint Final   Spine area: Lumbar      Date 2/27/2025     ROM 0-51 deg     Max Peak Torque 120      Min Peak Torque 50      Flex/Ext Ratio 2.4:1     % below normative data 57     % gain from initial test Not available visit 1        Treatment:  Therapeutic  "Exercise  Therapeutic Exercise Activity 1: Warm up next - Bike 5'  Therapeutic Exercise Activity 3: Double Knee to Chest x10  Therapeutic Exercise Activity 4: Extension in Lying x10  Therapeutic Exercise Activity 5: Open book stretch x10  Therapeutic Exercise Activity 6: Prone hip extension x15  Therapeutic Exercise Activity 7: PPT x10  Therapeutic Exercise Activity 8: Bridges 2x10  Therapeutic Exercise Activity 9: Pirifomris stretch 30"    Peripheral muscle strengthening which included one set of 15-20 repetitions at a slow and controlled 10-13 second per rep pace focused on strengthening supporting musculature in order to improve body mechanics and functional mobility. Patient and therapist focused on proper form during treatment to ensure optimal strengthening of each targeted muscle group.  Machines utilized included:Torso rotation, Leg Ext, Leg Curl, Chest Press, and RowingTriceps, Biceps, Hip Abd, and Leg Press     Balance/Neuromuscular Re-Education  Balance/Neuromuscular Re-Education Activity 1: Lumbar Medx : 60 ft lbs,    MedX exercise :  Patient received neuromuscular education for 10 minutes via participation on the Medical MedX Machine. Therapist assisted patient in isolating and engaging spinal stabilization musculature in order to improve functional ability and postural control. Patient performed exercise with therapist guidance in order to accurately use pacer function, avoid valsalva, and optimally exert effort within a safe and effective range via the Frans[place] Exertion Rating Scale. Patient instructed to perform at a midrange of exertion and to complete 15-20 repetitions within appropriate split time, with proper technique, and while maintaining safety.               3/6/2025     9:31 AM   [facility]   Visit Number 4   VAS Pain Rating 0   Time 5   Extension in Lying 10   Flexion in Lying 10   Lumbar Weight 60 lbs   Repetitions 15   Rating of Perceived Exertion 7   Ice - Sitting 5 mins. "     **Decrease resistance next visit**    Assessment & Plan   Assessment: Zion returns to  without LBP and compliance with HEP. Added prone and supine lumbar extension strengtheing exercises with hip extension and bridges with moderate cues. Updated HEP given. MAintained resistance on the lumbar medx and completed 15 reps on lumbar MedX with PRE of 7/10, plan to decrease resistance next visit due to high RPE. Some difficulty with bicep curl and leg extension peripheral exercises and will adujst as tolerated.  Evaluation/Treatment Tolerance: Patient tolerated treatment well    Patient will continue to benefit from skilled outpatient physical therapy to address the deficits listed in the problem list box on initial evaluation, provide pt/family education and to maximize pt's level of independence in the home and community environment.     Patient's spiritual, cultural, and educational needs considered and patient agreeable to plan of care and goals.           Plan: Continue with established Plan of Care towards established PT goals.    Goals:   Active       Long term goals: 10 weeks or 20 visits        Pt will demonstrate increased lumbar ROM by at least 6 degrees from initial ROM value, resulting in improved ability to perform functional forward bending while standing and sitting. (Progressing)       Start:  02/25/25    Expected End:  05/06/25            Pt will demonstrate increased MedX average isometric strength value by 35% from initial test resulting in improved ability to perform bending, lifting, and carrying activities safely and confidently. (Progressing)       Start:  02/25/25    Expected End:  05/06/25            Pt to demonstrate ability to independently control and reduce their pain through posture positioning and mechanical movements throughout a typical day. (Progressing)       Start:  02/25/25    Expected End:  05/06/25            Pt will demonstrate reduced pain and improved functional outcomes  as reported on the FOTO by reaching an intake score of >/= 67% functional ability in order to demonstrate subjective improvement in patient's condition. . (Progressing)       Start:  02/25/25    Expected End:  05/06/25            Pt will be able to complete sit to stand transfers without increased pain in the low back (Progressing)       Start:  02/25/25    Expected End:  05/06/25               Long term goals: 10 weeks or 20 visits        Pt will be able to stand up to 30+ min without increase in low back pain (Progressing)       Start:  02/25/25    Expected End:  05/06/25            Pt will demonstrate independence with the HEP at discharge. (Progressing)       Start:  02/25/25    Expected End:  05/06/25               Short term goals:  6 weeks or 10 visits        Pt will demonstrate increased lumbar ROM by at least 3 degrees from the initial ROM value with improvements noted in functional ROM and ability to perform ADLs. (Progressing)       Start:  02/25/25    Expected End:  04/08/25            Pt will demonstrate increased MedX average isometric strength value by 20% from initial test resulting in improved ability to perform bending, lifting, and carrying activities safely, confidently. (Progressing)       Start:  02/25/25    Expected End:  04/08/25            Pt will report a reduction in worst pain score by 1-2 points for improved tolerance for morning transfers. (Progressing)       Start:  02/25/25    Expected End:  04/08/25            Pt able to perform HEP correctly with minimal cueing or supervision from therapist to encourage independent management of symptoms. (Progressing)       Start:  02/25/25    Expected End:  04/08/25                  Margarita Ndiaye, PT

## 2025-03-11 ENCOUNTER — CLINICAL SUPPORT (OUTPATIENT)
Dept: REHABILITATION | Facility: OTHER | Age: 75
End: 2025-03-11
Payer: MEDICARE

## 2025-03-11 DIAGNOSIS — R68.89 DECREASED FUNCTIONAL ACTIVITY TOLERANCE: ICD-10-CM

## 2025-03-11 DIAGNOSIS — M53.86 DECREASED ROM OF INTERVERTEBRAL DISCS OF LUMBAR SPINE: Primary | ICD-10-CM

## 2025-03-11 DIAGNOSIS — R29.898 DECREASED STRENGTH OF TRUNK AND BACK: ICD-10-CM

## 2025-03-11 PROCEDURE — 97110 THERAPEUTIC EXERCISES: CPT | Mod: HCNC,CQ

## 2025-03-11 PROCEDURE — 97112 NEUROMUSCULAR REEDUCATION: CPT | Mod: HCNC,CQ

## 2025-03-11 NOTE — PROGRESS NOTES
"  Outpatient Rehab    Physical Therapy Visit    Patient Name: Zion Guillen  MRN: [id number]  YOB: 1950  Encounter Date: 3/11/2025    Therapy Diagnosis:   Encounter Diagnoses   Name Primary?    Decreased ROM of intervertebral discs of lumbar spine Yes    Decreased strength of trunk and back     Decreased functional activity tolerance      Physician: Shoshana Richards MD    Physician Orders: Eval and Treat  Medical Diagnosis: M54.50,G89.29 (ICD-10-CM) - Chronic bilateral low back pain without sciatica      Visit # / Visits Authorized:  5/ 20  Date of Evaluation:  2/25/2025   Insurance Authorization Period: 2/25/2025 to 4/27/2025  Plan of Care Certification:  2/25/2025 to 4/7/2025                    PTA visits: 1/5     INSURANCE and OUTCOMES: Fee for Service with FOTO Outcomes 1/3     Precautions: standard     Pattern of pain determined: 1 mobility         Time In: 0900   Time Out: 1000  Total Time: 60   Total Billable Time: 30 min (1:1)    FOTO:  Intake Score:  %  Survey Score 1:  %  Survey Score 2:  %         Subjective   Patient reports experiencing increase soreness over the weekend that he was about to manage with HEP. He reports worst pain in the mornings.      Objective         Treatment:  Therapeutic Exercise  Therapeutic Exercise Activity 1: Warm up - Bike/TM  5'  Therapeutic Exercise Activity 2: Lower Trunk Rotation x10  Therapeutic Exercise Activity 3: Double Knee to Chest x10  Therapeutic Exercise Activity 4: Extension in Lying x10  Therapeutic Exercise Activity 5: Open book stretch x10  Therapeutic Exercise Activity 6: Prone hip extension x15-np  Therapeutic Exercise Activity 7: PPT x10  Therapeutic Exercise Activity 8: Bridges 2x10  Therapeutic Exercise Activity 9: Pirifomris stretch 30"  Therapeutic Exercise Activity 10: Peripheral muscle strengthening which included one set of 15-20 repetitions focused on strengthening supporting musculature in order to improve body mechanics and " functional mobility. Targeted muscle group machines:Torso rotation, Leg Ext, Leg Curl, Chest Press, Rowing  Triceps, Biceps, Hip Abd, Hip Add, and Leg Press    Balance/Neuromuscular Re-Education  Balance/Neuromuscular Re-Education Activity 1: Lumbar Medx : 55 ft lbs,    Medical MedX Treatment as follows:  Patient received neuromuscular education for 10 minutes via participation on the Medical MedX Machine. Therapist assisted patient in isolating and engaging spinal stabilization musculature in order to improve functional ability and postural control. Patient performed exercise with therapist guidance in order to accurately use pacer function, avoid valsalva, and optimally exert effort within a safe and effective range via the Frans[place] Exertion Rating Scale. Patient instructed to perform at a midrange of exertion and to complete 15-20 repetitions within appropriate split time, with proper technique, and while maintaining safety.              3/11/2025     9:32 AM   [facility]   Visit Number 5   VAS Pain Rating 6   Speed 5 mph   Extension in Lying 10   Flexion in Lying 10   Lumbar Weight 55 lbs   Repetitions 15   Rating of Perceived Exertion 3   Ice - Sitting 5 mins.          Assessment & Plan   Assessment: Patient presents to therapy with reports of 6/10 pain level this morning. He was instructed in exercises for pain reduction to improve tolerance to functional mobility, requiring cues to prevent substitution patterns. Lumbar MedX was performed at reduced resistance of 55 lbs /ft with completion of 15 reps at an exertion rating of 3/10. Peripheral strengthening completed w/o adverse effects. Cardio handout issued with review.  Evaluation/Treatment Tolerance: Patient tolerated treatment well    Patient will continue to benefit from skilled outpatient physical therapy to address the deficits listed in the problem list box on initial evaluation, provide pt/family education and to maximize pt's level of  independence in the home and community environment.     Patient's spiritual, cultural, and educational needs considered and patient agreeable to plan of care and goals.           Plan: Continue with established Plan of Care towards established PT goals.    Goals:   Active       Long term goals: 10 weeks or 20 visits        Pt will demonstrate increased lumbar ROM by at least 6 degrees from initial ROM value, resulting in improved ability to perform functional forward bending while standing and sitting. (Progressing)       Start:  02/25/25    Expected End:  05/06/25            Pt will demonstrate increased MedX average isometric strength value by 35% from initial test resulting in improved ability to perform bending, lifting, and carrying activities safely and confidently. (Progressing)       Start:  02/25/25    Expected End:  05/06/25            Pt to demonstrate ability to independently control and reduce their pain through posture positioning and mechanical movements throughout a typical day. (Progressing)       Start:  02/25/25    Expected End:  05/06/25            Pt will demonstrate reduced pain and improved functional outcomes as reported on the FOTO by reaching an intake score of >/= 67% functional ability in order to demonstrate subjective improvement in patient's condition. . (Progressing)       Start:  02/25/25    Expected End:  05/06/25            Pt will be able to complete sit to stand transfers without increased pain in the low back (Progressing)       Start:  02/25/25    Expected End:  05/06/25               Long term goals: 10 weeks or 20 visits        Pt will be able to stand up to 30+ min without increase in low back pain (Progressing)       Start:  02/25/25    Expected End:  05/06/25            Pt will demonstrate independence with the HEP at discharge. (Progressing)       Start:  02/25/25    Expected End:  05/06/25               Short term goals:  6 weeks or 10 visits        Pt will demonstrate  increased lumbar ROM by at least 3 degrees from the initial ROM value with improvements noted in functional ROM and ability to perform ADLs. (Progressing)       Start:  02/25/25    Expected End:  04/08/25            Pt will demonstrate increased MedX average isometric strength value by 20% from initial test resulting in improved ability to perform bending, lifting, and carrying activities safely, confidently. (Progressing)       Start:  02/25/25    Expected End:  04/08/25            Pt will report a reduction in worst pain score by 1-2 points for improved tolerance for morning transfers. (Progressing)       Start:  02/25/25    Expected End:  04/08/25            Pt able to perform HEP correctly with minimal cueing or supervision from therapist to encourage independent management of symptoms. (Progressing)       Start:  02/25/25    Expected End:  04/08/25                Leida Arriola PTA

## 2025-03-13 ENCOUNTER — CLINICAL SUPPORT (OUTPATIENT)
Dept: REHABILITATION | Facility: OTHER | Age: 75
End: 2025-03-13
Payer: MEDICARE

## 2025-03-13 ENCOUNTER — PATIENT MESSAGE (OUTPATIENT)
Dept: AUDIOLOGY | Facility: CLINIC | Age: 75
End: 2025-03-13
Payer: MEDICARE

## 2025-03-13 DIAGNOSIS — R68.89 DECREASED FUNCTIONAL ACTIVITY TOLERANCE: ICD-10-CM

## 2025-03-13 DIAGNOSIS — M53.86 DECREASED ROM OF INTERVERTEBRAL DISCS OF LUMBAR SPINE: Primary | ICD-10-CM

## 2025-03-13 DIAGNOSIS — R29.898 DECREASED STRENGTH OF TRUNK AND BACK: ICD-10-CM

## 2025-03-13 PROCEDURE — 97110 THERAPEUTIC EXERCISES: CPT | Mod: HCNC,CQ

## 2025-03-13 PROCEDURE — 97112 NEUROMUSCULAR REEDUCATION: CPT | Mod: HCNC,CQ

## 2025-03-13 NOTE — PROGRESS NOTES
Outpatient Rehab    Physical Therapy Visit    Patient Name: Zion Guillen  MRN: [id number]  YOB: 1950  Encounter Date: 3/13/2025    Therapy Diagnosis:   Encounter Diagnoses   Name Primary?    Decreased ROM of intervertebral discs of lumbar spine Yes    Decreased strength of trunk and back     Decreased functional activity tolerance        Physician: Shoshana Richards MD    Physician Orders: Eval and Treat  Medical Diagnosis: M54.50,G89.29 (ICD-10-CM) - Chronic bilateral low back pain without sciatica      Visit # / Visits Authorized:  6/ 20  Date of Evaluation:  2/25/2025   Insurance Authorization Period: 2/25/2025 to 4/27/2025  Plan of Care Certification:  2/25/2025 to 4/7/2025                    PTA visits: 2/5     INSURANCE and OUTCOMES: Fee for Service with FOTO Outcomes 1/3     Precautions: standard     Pattern of pain determined: 1 mobility         Time In: 0900   Time Out: 1000  Total Time: 6060   Total Billable Time: 38 min (1:1)           Subjective   Zion report increased LBP in the begining of the wk but has improved symptoms now..      Objective   Intake Outcome Measure for FOTO Survey     Therapist reviewed FOTO scores for Zion Guillen on 2/25/2025.   FOTO report - see Media section or FOTO account episode details.    Intake Score: 59 (Goal 67)%     Isometric Testing on Med X equipment: Testing administered by PT     Test Initial Midpoint Final   Spine area: Lumbar         Date 2/27/2025       ROM 0-51 deg       Max Peak Torque 120        Min Peak Torque 50        Flex/Ext Ratio 2.4:1       % below normative data 57       % gain from initial test Not available visit 1           Treatment:  Therapeutic Exercise  TE 1: Warm up - Bike/TM  5'  TE 2: Lower Trunk Rotation x10  TE 3: Double Knee to Chest x10  TE 4: Extension in Lying x10 and EIS x10  TE 5: Standing Open book stretch x10  TE 6: Prone hip extension x15  TE 7: PPT x10  TE 8: Bridges 2x10  TE 10: Peripheral muscle  strengthening which included one set of 15-20 repetitions focused on strengthening supporting musculature in order to improve body mechanics and functional mobility. Targeted muscle group machines:Torso rotation, Leg Ext, Leg Curl, Chest Press, Rowing  Triceps, Biceps, Hip Abd, Hip Add, and Leg Press    Balance/Neuromuscular Re-Education  NMR 1: Lumbar Medx : 55 ft lbs, 20 reps, 4/10 PRE  NMR 2: +Paloff press    Medical MedX Treatment as follows:  Patient received neuromuscular education for 10 minutes via participation on the Medical MedX Machine. Therapist assisted patient in isolating and engaging spinal stabilization musculature in order to improve functional ability and postural control. Patient performed exercise with therapist guidance in order to accurately use pacer function, avoid valsalva, and optimally exert effort within a safe and effective range via the Fransico Exertion Rating Scale. Patient instructed to perform at a midrange of exertion and to complete 15-20 repetitions within appropriate split time, with proper technique, and while maintaining safety.                   3/13/2025     9:30 AM   [facility]   Visit Number 6   VAS Pain Rating 1   Time 5   Extension in Lying 10   Extension in Standing 10   Flexion in Lying 10   Lumbar Weight 55 lbs   Repetitions 20   Rating of Perceived Exertion 4   Ice - Sitting 5 mins.         Assessment & Plan   Assessment: Patient presents to  with less pain than last visit and was able to tolerate all ex well.   Lumbar MedX  resistance maintained at 55 ft/lbs, completing 20 reps at an exertion rating of 4/10. Peripheral strengthening completed w/o adverse effects. Cont to progress with  protocol and pt's tolerance.  Evaluation/Treatment Tolerance: Patient tolerated treatment well    Patient will continue to benefit from skilled outpatient physical therapy to address the deficits listed in the problem list box on initial evaluation, provide pt/family  education and to maximize pt's level of independence in the home and community environment.     Patient's spiritual, cultural, and educational needs considered and patient agreeable to plan of care and goals.           Plan:  Cont POC    Goals:   Active       Long term goals: 10 weeks or 20 visits        Pt will demonstrate increased lumbar ROM by at least 6 degrees from initial ROM value, resulting in improved ability to perform functional forward bending while standing and sitting. (Progressing)       Start:  02/25/25    Expected End:  05/06/25            Pt will demonstrate increased MedX average isometric strength value by 35% from initial test resulting in improved ability to perform bending, lifting, and carrying activities safely and confidently. (Progressing)       Start:  02/25/25    Expected End:  05/06/25            Pt to demonstrate ability to independently control and reduce their pain through posture positioning and mechanical movements throughout a typical day. (Progressing)       Start:  02/25/25    Expected End:  05/06/25            Pt will demonstrate reduced pain and improved functional outcomes as reported on the FOTO by reaching an intake score of >/= 67% functional ability in order to demonstrate subjective improvement in patient's condition. . (Progressing)       Start:  02/25/25    Expected End:  05/06/25            Pt will be able to complete sit to stand transfers without increased pain in the low back (Progressing)       Start:  02/25/25    Expected End:  05/06/25               Long term goals: 10 weeks or 20 visits        Pt will be able to stand up to 30+ min without increase in low back pain (Progressing)       Start:  02/25/25    Expected End:  05/06/25            Pt will demonstrate independence with the HEP at discharge. (Progressing)       Start:  02/25/25    Expected End:  05/06/25               Short term goals:  6 weeks or 10 visits        Pt will demonstrate increased lumbar ROM  by at least 3 degrees from the initial ROM value with improvements noted in functional ROM and ability to perform ADLs. (Progressing)       Start:  02/25/25    Expected End:  04/08/25            Pt will demonstrate increased MedX average isometric strength value by 20% from initial test resulting in improved ability to perform bending, lifting, and carrying activities safely, confidently. (Progressing)       Start:  02/25/25    Expected End:  04/08/25            Pt will report a reduction in worst pain score by 1-2 points for improved tolerance for morning transfers. (Progressing)       Start:  02/25/25    Expected End:  04/08/25            Pt able to perform HEP correctly with minimal cueing or supervision from therapist to encourage independent management of symptoms. (Progressing)       Start:  02/25/25    Expected End:  04/08/25                  Leslye Carreon, PTA

## 2025-03-17 ENCOUNTER — CLINICAL SUPPORT (OUTPATIENT)
Dept: AUDIOLOGY | Facility: CLINIC | Age: 75
End: 2025-03-17
Payer: MEDICARE

## 2025-03-17 DIAGNOSIS — H90.3 SENSORINEURAL HEARING LOSS, BILATERAL: Primary | ICD-10-CM

## 2025-03-17 NOTE — PROGRESS NOTES
Hearing Aid Follow-up:    Mr. Guillen was seen today for a hearing aid follow-up appointment. The TV setting disappeared from his [employer] luana. I followed the steps outlined by Gavi Michaels, our [employer] rep and the problem was resolved. Mr. Guillen's hearing aids were cleaned and checked. A listening check confirmed both devices to be in good working condition. Mr. Guillen will return for his annual f/u or sooner if needed.       Itemized: 12/26/24    Invoice Date: 9/16/2024  [employer] [id number]  Silver Batres  LT SN: [id number]  RT SN: [id number]   6: M1  Dome: Small Open  Warranty Exp: 10/15/2027    [employer] JADA  SN: [id number]  Warranty Exp: 10/15/2027

## 2025-03-18 ENCOUNTER — CLINICAL SUPPORT (OUTPATIENT)
Dept: REHABILITATION | Facility: OTHER | Age: 75
End: 2025-03-18
Payer: MEDICARE

## 2025-03-18 DIAGNOSIS — R68.89 DECREASED FUNCTIONAL ACTIVITY TOLERANCE: ICD-10-CM

## 2025-03-18 DIAGNOSIS — M53.86 DECREASED ROM OF INTERVERTEBRAL DISCS OF LUMBAR SPINE: Primary | ICD-10-CM

## 2025-03-18 DIAGNOSIS — R29.898 DECREASED STRENGTH OF TRUNK AND BACK: ICD-10-CM

## 2025-03-18 PROCEDURE — 97110 THERAPEUTIC EXERCISES: CPT | Mod: HCNC,CQ

## 2025-03-18 PROCEDURE — 97112 NEUROMUSCULAR REEDUCATION: CPT | Mod: HCNC,CQ

## 2025-03-18 NOTE — PROGRESS NOTES
"  Outpatient Rehab    Physical Therapy Visit    Patient Name: Zion Guillen  MRN: [id number]  YOB: 1950  Encounter Date: 3/18/2025    Therapy Diagnosis:   Encounter Diagnoses   Name Primary?    Decreased ROM of intervertebral discs of lumbar spine Yes    Decreased strength of trunk and back     Decreased functional activity tolerance      Physician: Shoshana Richards MD    Physician Orders: Eval and Treat  Medical Diagnosis: Chronic bilateral low back pain without sciatica    Visit # / Visits Authorized:  7/20   Date of Evaluation:  2/25/2025   Insurance Authorization Period: 2/25/2025 to 4/27/2025  Plan of Care Certification:  2/25/2025 to 4/7/2025      PT/PTA: PTA   Number of PTA visits since last PT visit:3  Time In: 1030   Time Out: 1130  Total Time: 60   Total Billable Time: 30 min     FOTO:  Intake Score:  %  Survey Score 1:  %  Survey Score 2:  %         Subjective   Patient reports feeling better this date after experiencing a weekend flare-up that was manageable with stretches and OTC meds. He c/o experiencing increase pain at the end of the week for two weeks now and is unaware of any activity that would cause pain..  Pain reported as 2/10.      Objective              Treatment:  Therapeutic Exercise  TE 1: Warm up - Bike  TE 2: Lower Trunk Rotation x10  TE 3: Double Knee to Chest x10  TE 4: Extension in Lying x10     and EIS x10-np  TE 5: Sidelying Open book stretch x10  TE 6: Prone hip extension x15  TE 7: PPT x10  TE 8: Bridges 2x10  TE 9: Pirifomris stretch 30"  TE 10: Peripheral muscle strengthening which included one set of 15-20 repetitions focused on strengthening supporting musculature in order to improve body mechanics and functional mobility. Targeted muscle group machines:Torso rotation, Leg Ext, Leg Curl, Chest Press, Rowing  Triceps, Biceps, Hip Abd, Hip Add, and Leg Press  Balance/Neuromuscular Re-Education  NMR 1: Lumbar Medx : 60  ft lbs, 15  reps, 5/10 PRE  NMR 2: +Paloff " press-np    Medical MedX Treatment as follows:  Patient received neuromuscular education for 10 minutes via participation on the Medical MedX Machine. Therapist assisted patient in isolating and engaging spinal stabilization musculature in order to improve functional ability and postural control. Patient performed exercise with therapist guidance in order to accurately use pacer function, avoid valsalva, and optimally exert effort within a safe and effective range via the Fransico Exertion Rating Scale. Patient instructed to perform at a midrange of exertion and to complete 15-20 repetitions within appropriate split time, with proper technique, and while maintaining safety.          3/13/2025     9:30 AM   [facility]   Visit Number 6   VAS Pain Rating 1   Time 5   Extension in Lying 10   Extension in Standing 10   Flexion in Lying 10   Lumbar Weight 55 lbs   Repetitions 20   Rating of Perceived Exertion 4   Ice - Sitting 5 mins.       Time Entry(in minutes):  Neuromuscular Re-Education Time Entry: 10  Therapeutic Exercise Time Entry: 20    Assessment & Plan   Assessment: Patient presents to  with less pain this date vs over the weekend. He was able to resume therex without adverse effects, requiring min cues for technique. Lumbar MedX resistance increased to 60 ft/lbs, completing 15 reps at an exertion rating of 5/10. Peripheral strengthening completed w/o adverse effects. Cont to progress with  protocol and pt's tolerance.  Evaluation/Treatment Tolerance: Patient tolerated treatment well    Patient will continue to benefit from skilled outpatient physical therapy to address the deficits listed in the problem list box on initial evaluation, provide pt/family education and to maximize pt's level of independence in the home and community environment.     Patient's spiritual, cultural, and educational needs considered and patient agreeable to plan of care and goals.           Plan: Continue with established  Plan of Care towards established PT goals.    Goals:   Active       Long term goals: 10 weeks or 20 visits        Pt will demonstrate increased lumbar ROM by at least 6 degrees from initial ROM value, resulting in improved ability to perform functional forward bending while standing and sitting. (Progressing)       Start:  02/25/25    Expected End:  05/06/25            Pt will demonstrate increased MedX average isometric strength value by 35% from initial test resulting in improved ability to perform bending, lifting, and carrying activities safely and confidently. (Progressing)       Start:  02/25/25    Expected End:  05/06/25            Pt to demonstrate ability to independently control and reduce their pain through posture positioning and mechanical movements throughout a typical day. (Progressing)       Start:  02/25/25    Expected End:  05/06/25            Pt will demonstrate reduced pain and improved functional outcomes as reported on the FOTO by reaching an intake score of >/= 67% functional ability in order to demonstrate subjective improvement in patient's condition. . (Progressing)       Start:  02/25/25    Expected End:  05/06/25            Pt will be able to complete sit to stand transfers without increased pain in the low back (Progressing)       Start:  02/25/25    Expected End:  05/06/25               Long term goals: 10 weeks or 20 visits        Pt will be able to stand up to 30+ min without increase in low back pain (Progressing)       Start:  02/25/25    Expected End:  05/06/25            Pt will demonstrate independence with the HEP at discharge. (Progressing)       Start:  02/25/25    Expected End:  05/06/25               Short term goals:  6 weeks or 10 visits        Pt will demonstrate increased lumbar ROM by at least 3 degrees from the initial ROM value with improvements noted in functional ROM and ability to perform ADLs. (Progressing)       Start:  02/25/25    Expected End:  04/08/25             Pt will demonstrate increased MedX average isometric strength value by 20% from initial test resulting in improved ability to perform bending, lifting, and carrying activities safely, confidently. (Progressing)       Start:  02/25/25    Expected End:  04/08/25            Pt will report a reduction in worst pain score by 1-2 points for improved tolerance for morning transfers. (Progressing)       Start:  02/25/25    Expected End:  04/08/25            Pt able to perform HEP correctly with minimal cueing or supervision from therapist to encourage independent management of symptoms. (Progressing)       Start:  02/25/25    Expected End:  04/08/25                Leida Arriola PTA

## 2025-03-20 ENCOUNTER — OFFICE VISIT (OUTPATIENT)
Dept: OPTOMETRY | Facility: CLINIC | Age: 75
End: 2025-03-20
Payer: MEDICARE

## 2025-03-20 ENCOUNTER — CLINICAL SUPPORT (OUTPATIENT)
Dept: REHABILITATION | Facility: OTHER | Age: 75
End: 2025-03-20
Payer: MEDICARE

## 2025-03-20 DIAGNOSIS — H52.203 MYOPIA WITH ASTIGMATISM AND PRESBYOPIA, BILATERAL: ICD-10-CM

## 2025-03-20 DIAGNOSIS — R68.89 DECREASED FUNCTIONAL ACTIVITY TOLERANCE: ICD-10-CM

## 2025-03-20 DIAGNOSIS — H52.4 MYOPIA WITH ASTIGMATISM AND PRESBYOPIA, BILATERAL: ICD-10-CM

## 2025-03-20 DIAGNOSIS — H53.10 SUBJECTIVE VISUAL DISTURBANCE: Primary | ICD-10-CM

## 2025-03-20 DIAGNOSIS — H25.13 NUCLEAR SCLEROSIS OF BOTH EYES: ICD-10-CM

## 2025-03-20 DIAGNOSIS — H52.13 MYOPIA WITH ASTIGMATISM AND PRESBYOPIA, BILATERAL: ICD-10-CM

## 2025-03-20 DIAGNOSIS — M53.86 DECREASED ROM OF INTERVERTEBRAL DISCS OF LUMBAR SPINE: Primary | ICD-10-CM

## 2025-03-20 DIAGNOSIS — R29.898 DECREASED STRENGTH OF TRUNK AND BACK: ICD-10-CM

## 2025-03-20 PROCEDURE — 92015 DETERMINE REFRACTIVE STATE: CPT | Mod: HCNC,S$GLB,, | Performed by: OPTOMETRIST

## 2025-03-20 PROCEDURE — 97112 NEUROMUSCULAR REEDUCATION: CPT | Mod: HCNC,CQ

## 2025-03-20 PROCEDURE — 1159F MED LIST DOCD IN RCRD: CPT | Mod: HCNC,CPTII,S$GLB, | Performed by: OPTOMETRIST

## 2025-03-20 PROCEDURE — 97110 THERAPEUTIC EXERCISES: CPT | Mod: HCNC,CQ

## 2025-03-20 PROCEDURE — 99999 PR PBB SHADOW E&M-EST. PATIENT-LVL I: CPT | Mod: PBBFAC,HCNC,, | Performed by: OPTOMETRIST

## 2025-03-20 PROCEDURE — 99212 OFFICE O/P EST SF 10 MIN: CPT | Mod: HCNC,S$GLB,, | Performed by: OPTOMETRIST

## 2025-03-20 NOTE — PROGRESS NOTES
"  Outpatient Rehab    Physical Therapy Visit    Patient Name: Zion Guillen  MRN: [id number]  YOB: 1950  Encounter Date: 3/20/2025    Therapy Diagnosis:   Encounter Diagnoses   Name Primary?    Decreased ROM of intervertebral discs of lumbar spine Yes    Decreased strength of trunk and back     Decreased functional activity tolerance      Physician: Shoshana Richards MD    Physician Orders: Eval and Treat  Medical Diagnosis: Chronic bilateral low back pain without sciatica    Visit # / Visits Authorized:  8/20   Date of Evaluation:  2/25/2025   Insurance Authorization Period: 2/25/2025 to 4/27/2025  Plan of Care Certification:  2/25/2025 to 4/7/2025      PT/PTA: PTA   Number of PTA visits since last PT visit:4  Time In: 3:10    Time Out: 0400  Total Time: 50   Total Billable Time: 20  min     FOTO:  Intake Score:  %  Survey Score 1:  %  Survey Score 2:  %         Subjective   Patient reports feeling better today as he reports min pain this date..  Pain reported as 2/10.      Objective            Treatment:   Therapeutic Exercise  TE 1: Warm up - Bike-np  TE 2: Lower Trunk Rotation x10  TE 3: Double Knee to Chest x10  TE 4: Extension in Lying x10     and EIS x10-np  TE 5: Sidelying Open book stretch x10-np  TE 6: Prone hip extension x15  TE 7: PPT x10  TE 8: Bridges 2x10  TE 9: Pirifomris stretch 30"  Balance/Neuromuscular Re-Education  NMR 1: Lumbar Medx : 60  ft lbs, 18 reps, 4/10 PRE  NMR 2: +Paloff press-np    Medical MedX Treatment as follows:  Patient received neuromuscular education for 10 minutes via participation on the Medical MedX Machine. Therapist assisted patient in isolating and engaging spinal stabilization musculature in order to improve functional ability and postural control. Patient performed exercise with therapist guidance in order to accurately use pacer function, avoid valsalva, and optimally exert effort within a safe and effective range via the Fransico Exertion Rating Scale. " Patient instructed to perform at a midrange of exertion and to complete 15-20 repetitions within appropriate split time, with proper technique, and while maintaining safety.          3/20/2025     4:51 PM   [facility]   Visit Number 8   VAS Pain Rating 2   Treadmill Time (in min.) 0 min   Extension in Lying 15   Flexion in Lying 10   Lumbar Weight 60 lbs   Repetitions 18   Rating of Perceived Exertion 4   Ice - Z Lie (in min.) 5          Time Entry(in minutes):  Neuromuscular Re-Education Time Entry: 10  Therapeutic Exercise Time Entry: 20    Assessment & Plan   Assessment: Patient presents to HB session with less pain this date. He was able to resume therex without adverse effects, requiring min cues for technique. Lumbar MedX resistance maintained to 60 ft/lbs, completing 18 reps at an exertion rating of 4/10. Peripheral strengthening completed w/o adverse effects. Cont to progress with HB protocol and pt's tolerance.  Evaluation/Treatment Tolerance: Patient tolerated treatment well    Patient will continue to benefit from skilled outpatient physical therapy to address the deficits listed in the problem list box on initial evaluation, provide pt/family education and to maximize pt's level of independence in the home and community environment.     Patient's spiritual, cultural, and educational needs considered and patient agreeable to plan of care and goals.           Plan: Continue with established Plan of Care towards established PT goals.    Goals:   Active       Long term goals: 10 weeks or 20 visits        Pt will demonstrate increased lumbar ROM by at least 6 degrees from initial ROM value, resulting in improved ability to perform functional forward bending while standing and sitting. (Progressing)       Start:  02/25/25    Expected End:  05/06/25            Pt will demonstrate increased MedX average isometric strength value by 35% from initial test resulting in improved ability to perform bending,  lifting, and carrying activities safely and confidently. (Progressing)       Start:  02/25/25    Expected End:  05/06/25            Pt to demonstrate ability to independently control and reduce their pain through posture positioning and mechanical movements throughout a typical day. (Progressing)       Start:  02/25/25    Expected End:  05/06/25            Pt will demonstrate reduced pain and improved functional outcomes as reported on the FOTO by reaching an intake score of >/= 67% functional ability in order to demonstrate subjective improvement in patient's condition. . (Progressing)       Start:  02/25/25    Expected End:  05/06/25            Pt will be able to complete sit to stand transfers without increased pain in the low back (Progressing)       Start:  02/25/25    Expected End:  05/06/25               Long term goals: 10 weeks or 20 visits        Pt will be able to stand up to 30+ min without increase in low back pain (Progressing)       Start:  02/25/25    Expected End:  05/06/25            Pt will demonstrate independence with the HEP at discharge. (Progressing)       Start:  02/25/25    Expected End:  05/06/25               Short term goals:  6 weeks or 10 visits        Pt will demonstrate increased lumbar ROM by at least 3 degrees from the initial ROM value with improvements noted in functional ROM and ability to perform ADLs. (Progressing)       Start:  02/25/25    Expected End:  04/08/25            Pt will demonstrate increased MedX average isometric strength value by 20% from initial test resulting in improved ability to perform bending, lifting, and carrying activities safely, confidently. (Progressing)       Start:  02/25/25    Expected End:  04/08/25            Pt will report a reduction in worst pain score by 1-2 points for improved tolerance for morning transfers. (Progressing)       Start:  02/25/25    Expected End:  04/08/25            Pt able to perform HEP correctly with minimal cueing or  supervision from therapist to encourage independent management of symptoms. (Progressing)       Start:  02/25/25    Expected End:  04/08/25                Leida Arriola, PTA

## 2025-03-21 NOTE — PROGRESS NOTES
HPI    Pt sts he is for a refraction.  Pt sts his distance and near are both   blurry . Pt seen , & was told that cataract sx wasn't needed , just   a updated rx.   Last edited by Alessia Callahan on 3/20/2025 10:47 AM.            Assessment /Plan     For exam results, see Encounter Report.    Subjective visual disturbance    Nuclear sclerosis of both eyes    Myopia with astigmatism and presbyopia, bilateral      MONITOR.ED PT ON ALL EXAM FINDINGS  RX FINAL SPECS   CONTINUE TO MONITOR CATARACTS OU; UV PROTECTION   RTC 1 YR//PRN FOR REE/DFE

## 2025-03-25 ENCOUNTER — CLINICAL SUPPORT (OUTPATIENT)
Dept: REHABILITATION | Facility: OTHER | Age: 75
End: 2025-03-25
Payer: MEDICARE

## 2025-03-25 DIAGNOSIS — R68.89 DECREASED FUNCTIONAL ACTIVITY TOLERANCE: ICD-10-CM

## 2025-03-25 DIAGNOSIS — R29.898 DECREASED STRENGTH OF TRUNK AND BACK: ICD-10-CM

## 2025-03-25 DIAGNOSIS — M53.86 DECREASED ROM OF INTERVERTEBRAL DISCS OF LUMBAR SPINE: Primary | ICD-10-CM

## 2025-03-25 PROCEDURE — 97110 THERAPEUTIC EXERCISES: CPT | Mod: HCNC,CQ

## 2025-03-25 PROCEDURE — 97112 NEUROMUSCULAR REEDUCATION: CPT | Mod: HCNC,CQ

## 2025-03-25 NOTE — PROGRESS NOTES
"  Outpatient Rehab    Physical Therapy Visit    Patient Name: Zion Guillen  MRN: [id number]  YOB: 1950  Encounter Date: 3/25/2025    Therapy Diagnosis:   Encounter Diagnoses   Name Primary?    Decreased ROM of intervertebral discs of lumbar spine Yes    Decreased strength of trunk and back     Decreased functional activity tolerance        Physician: Shoshana Richards MD    Physician Orders: Eval and Treat  Medical Diagnosis: Chronic bilateral low back pain without sciatica    Visit # / Visits Authorized:  9/20   Date of Evaluation:  2/25/2025   Insurance Authorization Period: 2/25/2025 to 4/27/2025  Plan of Care Certification:  2/25/2025 to 4/7/2025      Number of PTA visits since last PT visit:5  Time In: 1030  Time Out: 1130   Total Time: 60   Total Billable Time: 30  min              Subjective   Patient reports this morning increased LB symptoms due to increased household chores and not performing his swimming ex the last 3 days. Today was rough first waking up"..  Pain reported as 2/10.      Objective   Intake Outcome Measure for FOTO Survey     Therapist reviewed FOTO scores for Zion Guillen on 2/25/2025.   FOTO report - see Media section or FOTO account episode details.    Intake Score: 59 (Goal 67)%     Isometric Testing on Med X equipment: Testing administered by PT     Test Initial Midpoint Final   Spine area: Lumbar         Date 2/27/2025       ROM 0-51 deg       Max Peak Torque 120        Min Peak Torque 50        Flex/Ext Ratio 2.4:1       % below normative data 57       % gain from initial test Not available visit 1             Treatment:     Therapeutic Exercise  TE 1: Stationary Bike 5 min  TE 2: Lower Trunk Rotation x10  TE 3: Double Knee to Chest x10  TE 4: Extension in Lying x10     and EIS x10  TE 5: Sidelying Open book stretch x10  TE 6: Prone hip extension 2x10  TE 8: Bridges 2x10  TE 10: Peripheral muscle strengthening which included one set of 15-20 repetitions focused " on strengthening supporting musculature in order to improve body mechanics and functional mobility. Targeted muscle group machines:Torso rotation, Leg Ext, Leg Curl, Chest Press, Rowing  Triceps, Biceps, Hip Abd, Hip Add, and Leg Press  Balance/Neuromuscular Re-Education  NMR 1: Lumbar Medx : 60  ft lbs, 18 reps, 4/10 PRE  NMR 2: +Paloff press      Medical MedX Treatment as follows:  Patient received neuromuscular education for 10 minutes via participation on the Medical MedX Machine. Therapist assisted patient in isolating and engaging spinal stabilization musculature in order to improve functional ability and postural control. Patient performed exercise with therapist guidance in order to accurately use pacer function, avoid valsalva, and optimally exert effort within a safe and effective range via the Fransico Exertion Rating Scale. Patient instructed to perform at a midrange of exertion and to complete 15-20 repetitions within appropriate split time, with proper technique, and while maintaining safety.               Time Entry(in minutes):       Assessment & Plan   Assessment:         Patient will continue to benefit from skilled outpatient physical therapy to address the deficits listed in the problem list box on initial evaluation, provide pt/family education and to maximize pt's level of independence in the home and community environment.     Patient's spiritual, cultural, and educational needs considered and patient agreeable to plan of care and goals.           Plan:      Goals:   Active       Long term goals: 10 weeks or 20 visits        Pt will demonstrate increased lumbar ROM by at least 6 degrees from initial ROM value, resulting in improved ability to perform functional forward bending while standing and sitting. (Progressing)       Start:  02/25/25    Expected End:  05/06/25            Pt will demonstrate increased MedX average isometric strength value by 35% from initial test resulting in improved  ability to perform bending, lifting, and carrying activities safely and confidently. (Progressing)       Start:  02/25/25    Expected End:  05/06/25            Pt to demonstrate ability to independently control and reduce their pain through posture positioning and mechanical movements throughout a typical day. (Progressing)       Start:  02/25/25    Expected End:  05/06/25            Pt will demonstrate reduced pain and improved functional outcomes as reported on the FOTO by reaching an intake score of >/= 67% functional ability in order to demonstrate subjective improvement in patient's condition. . (Progressing)       Start:  02/25/25    Expected End:  05/06/25            Pt will be able to complete sit to stand transfers without increased pain in the low back (Progressing)       Start:  02/25/25    Expected End:  05/06/25               Long term goals: 10 weeks or 20 visits        Pt will be able to stand up to 30+ min without increase in low back pain (Progressing)       Start:  02/25/25    Expected End:  05/06/25            Pt will demonstrate independence with the HEP at discharge. (Progressing)       Start:  02/25/25    Expected End:  05/06/25               Short term goals:  6 weeks or 10 visits        Pt will demonstrate increased lumbar ROM by at least 3 degrees from the initial ROM value with improvements noted in functional ROM and ability to perform ADLs. (Progressing)       Start:  02/25/25    Expected End:  04/08/25            Pt will demonstrate increased MedX average isometric strength value by 20% from initial test resulting in improved ability to perform bending, lifting, and carrying activities safely, confidently. (Progressing)       Start:  02/25/25    Expected End:  04/08/25            Pt will report a reduction in worst pain score by 1-2 points for improved tolerance for morning transfers. (Progressing)       Start:  02/25/25    Expected End:  04/08/25            Pt able to perform HEP  correctly with minimal cueing or supervision from therapist to encourage independent management of symptoms. (Progressing)       Start:  02/25/25    Expected End:  04/08/25                  Leslye Carreon, PTA

## 2025-03-27 ENCOUNTER — CLINICAL SUPPORT (OUTPATIENT)
Dept: REHABILITATION | Facility: OTHER | Age: 75
End: 2025-03-27
Payer: MEDICARE

## 2025-03-27 DIAGNOSIS — M53.86 DECREASED ROM OF INTERVERTEBRAL DISCS OF LUMBAR SPINE: Primary | ICD-10-CM

## 2025-03-27 DIAGNOSIS — R68.89 DECREASED FUNCTIONAL ACTIVITY TOLERANCE: ICD-10-CM

## 2025-03-27 DIAGNOSIS — R29.898 DECREASED STRENGTH OF TRUNK AND BACK: ICD-10-CM

## 2025-03-27 PROCEDURE — 97110 THERAPEUTIC EXERCISES: CPT | Mod: HCNC

## 2025-03-27 PROCEDURE — 97112 NEUROMUSCULAR REEDUCATION: CPT | Mod: HCNC

## 2025-03-27 NOTE — PROGRESS NOTES
Outpatient Rehab    Physical Therapy Progress Note    Patient Name: Zion Guillen  MRN: [id number]  YOB: 1950  Encounter Date: 3/27/2025    Therapy Diagnosis:   Encounter Diagnoses   Name Primary?    Decreased ROM of intervertebral discs of lumbar spine Yes    Decreased strength of trunk and back     Decreased functional activity tolerance      Physician: Shoshana Richards MD    Physician Orders: Eval and Treat  Medical Diagnosis: Chronic bilateral low back pain without sciatica    Visit # / Visits Authorized:  9 / 20  Insurance Authorization Period: 2/25/2025 to 4/27/2025  Date of Evaluation: 2/25/2025   Plan of Care Certification: 2/25/2025 to 4/7/2025      PT/PTA: PT   Number of PTA visits since last PT visit:0  Time In: 1105   Time Out: 1205  Total Time: 60   Total Billable Time: 60    FOTO:  Intake Score: 59%  Survey Score 1: 62%  Survey Score 2:  %         Subjective   Zion reports he woke up with less pain about 1/10 and it usually about 2 or 3/10. He did a lot of cores (repetitive bending) on Sunday and that aggrivated his back to where he couldn't swim for 2-3 day.  Pain reported as 1/10.      Objective            Isometric Testing on Med X equipment: Testing administered by PT     Test Initial Midpoint Final   Spine area: Lumbar         Date 2/27/2025  3/27/2025     ROM 0-51 deg  0-57     Max Peak Torque 120   114     Min Peak Torque 50   26     Flex/Ext Ratio 2.4:1  4.3     % below normative data 57  64     % gain from initial test Not available visit 1  -14         Treatment:  Therapeutic Exercise  TE 1: Stationary Bike 5 min  TE 2: Lower Trunk Rotation x10  TE 3: Double Knee to Chest x10  TE 5: Sidelying Open book stretch x10 - Side lying Clams x15  TE 6: Prone hip extension x15  TE 8: Bridges x15 GTB  TE 10: Peripheral muscle strengthening which included one set of 15-20 repetitions focused on strengthening supporting musculature in order to improve body mechanics and functional  mobility. Targeted muscle group machines:Torso rotation, Leg Ext, Leg Curl, Chest Press, Rowing  Triceps, Biceps, Hip Abd, Hip Add, and Leg Press  Balance/Neuromuscular Re-Education  NMR 1: Lumbar Medx : TESTING  NMR 2: Paloff press GTB x15  NMR 3: Bird/Dog    MedX testing: Patient  received neuromuscular education to engage spinal musculature correctly for motor control and engagement of musculature for 10 minutes including the MedX exercise component and practice and standard testing. MedX dynamic exercise and baseline isometric test performed with instructions to guide the patient safely through the testing procedure. Patient instructed to perform isometric test correctly and safely while building to an optimal force with a pain-free effort. Patient also instructed that they should feel support/pressure from MedX restraints but no pain/discomfort, and encouraged to report any pain to therapist. Patient demonstrated appropriate understanding of information and tolerance of test.  Education regarding purpose of test, safety during test given, and reviewed possible more soreness and strategies.             3/27/2025    11:36 AM   [facility]   Visit Number 10   VAS Pain Rating 1   Time 5   Lumbar Flexion 57   Lumbar Extension 0   Lumbar Peak Torque 114 ft. lbs.   Min Torque 26   Test Percent Below Normative Data 67 %         Time Entry(in minutes):  Hot/Cold Pack Time Entry: 5  Neuromuscular Re-Education Time Entry: 15  Therapeutic Exercise Time Entry: 40    Assessment & Plan   Assessment: Patient has attended 10 visits at [facility] which included MD evaluation, PT evaluation with isometric testing, and physical therapy treatment including HEP instruction, education, aerobic work, dynamic strengthening on med ex equipment for the spine, and whole body strengthening on med ex equipment with increasing weight loads.  Patient  is demonstrating increased ability to reduce symptoms, improved posture,  Increase ROM, and decrease strength on med ex test by -14% average.  Evaluation/Treatment Tolerance: Patient tolerated treatment well    Patient will continue to benefit from skilled outpatient physical therapy to address the deficits listed in the problem list box on initial evaluation, provide pt/family education and to maximize pt's level of independence in the home and community environment.     Patient's spiritual, cultural, and educational needs considered and patient agreeable to plan of care and goals.           Plan: Continue with established Plan of Care towards established PT goals.    Goals:   Active       Long term goals: 10 weeks or 20 visits        Pt will demonstrate increased lumbar ROM by at least 6 degrees from initial ROM value, resulting in improved ability to perform functional forward bending while standing and sitting. (Met)       Start:  02/25/25    Expected End:  05/06/25    Resolved:  03/27/25         Pt will demonstrate increased MedX average isometric strength value by 35% from initial test resulting in improved ability to perform bending, lifting, and carrying activities safely and confidently. (Progressing)       Start:  02/25/25    Expected End:  05/06/25            Pt to demonstrate ability to independently control and reduce their pain through posture positioning and mechanical movements throughout a typical day. (Progressing)       Start:  02/25/25    Expected End:  05/06/25            Pt will demonstrate reduced pain and improved functional outcomes as reported on the FOTO by reaching an intake score of >/= 67% functional ability in order to demonstrate subjective improvement in patient's condition. . (Progressing)       Start:  02/25/25    Expected End:  05/06/25            Pt will be able to complete sit to stand transfers without increased pain in the low back (Progressing)       Start:  02/25/25    Expected End:  05/06/25               Long term goals: 10 weeks or 20 visits         Pt will be able to stand up to 30+ min without increase in low back pain (Progressing)       Start:  02/25/25    Expected End:  05/06/25            Pt will demonstrate independence with the HEP at discharge. (Progressing)       Start:  02/25/25    Expected End:  05/06/25               Short term goals:  6 weeks or 10 visits        Pt will demonstrate increased lumbar ROM by at least 3 degrees from the initial ROM value with improvements noted in functional ROM and ability to perform ADLs. (Met)       Start:  02/25/25    Expected End:  04/08/25    Resolved:  03/27/25         Pt will demonstrate increased MedX average isometric strength value by 20% from initial test resulting in improved ability to perform bending, lifting, and carrying activities safely, confidently. (Progressing)       Start:  02/25/25    Expected End:  04/08/25            Pt will report a reduction in worst pain score by 1-2 points for improved tolerance for morning transfers. (Met)       Start:  02/25/25    Expected End:  04/08/25    Resolved:  03/27/25         Pt able to perform HEP correctly with minimal cueing or supervision from therapist to encourage independent management of symptoms. (Met)       Start:  02/25/25    Expected End:  04/08/25    Resolved:  03/27/25             Margarita Ndiaye, PT

## 2025-04-01 ENCOUNTER — CLINICAL SUPPORT (OUTPATIENT)
Dept: REHABILITATION | Facility: OTHER | Age: 75
End: 2025-04-01
Payer: MEDICARE

## 2025-04-01 DIAGNOSIS — M53.86 DECREASED ROM OF INTERVERTEBRAL DISCS OF LUMBAR SPINE: Primary | ICD-10-CM

## 2025-04-01 DIAGNOSIS — R29.898 DECREASED STRENGTH OF TRUNK AND BACK: ICD-10-CM

## 2025-04-01 DIAGNOSIS — R68.89 DECREASED FUNCTIONAL ACTIVITY TOLERANCE: ICD-10-CM

## 2025-04-01 PROCEDURE — 97112 NEUROMUSCULAR REEDUCATION: CPT | Mod: HCNC,CQ

## 2025-04-01 PROCEDURE — 97110 THERAPEUTIC EXERCISES: CPT | Mod: HCNC,CQ

## 2025-04-01 NOTE — PROGRESS NOTES
"  Outpatient Rehab    Physical Therapy Progress Note    Patient Name: Zion Guillen  MRN: [id number]  YOB: 1950  Encounter Date: 4/1/2025    Therapy Diagnosis:   Encounter Diagnoses   Name Primary?    Decreased ROM of intervertebral discs of lumbar spine Yes    Decreased strength of trunk and back     Decreased functional activity tolerance        Physician: Shoshana Richards MD    Physician Orders: Eval and Treat  Medical Diagnosis: Chronic bilateral low back pain without sciatica    Visit # / Visits Authorized:  11/20  Insurance Authorization Period: 2/25/2025 to 4/27/2025  Date of Evaluation: 2/25/2025   Plan of Care Certification: 2/25/2025 to 4/7/2025      PT/PTA: PTA   Number of PTA visits since last PT visit:1  Time In: 1030   Time Out: 1130  Total Time: 60   Total Billable Time: 30             Subjective   Zion reports no pain this morning and performing chores over the weekend which did not cause flare and able to swim Mon..         Objective          Isometric Testing on Med X equipment: Testing administered by PT     Test Initial Midpoint Final   Spine area: Lumbar         Date 2/27/2025  3/27/2025     ROM 0-51 deg  0-57     Max Peak Torque 120   114     Min Peak Torque 50   26     Flex/Ext Ratio 2.4:1  4.3     % below normative data 57  64     % gain from initial test Not available visit 1  -14         Treatment:  Therapeutic Exercise  TE 1: Stationary Bike 5 min  TE 2: Lower Trunk Rotation x10  TE 3: Double Knee to Chest x10  TE 4: Extension in Lying x10     and EIS x10  TE 5: Sidelying Open book stretch x10 - Side lying Clams x15  TE 6: Prone hip extension x15  TE 7: PPT x10  TE 8: Bridges x15 GTB  TE 9: Pirifomris stretch 30"  Balance/Neuromuscular Re-Education  NMR 1: Lumbar Medx  NMR 2: Paloff press GTB x15  NMR 3: Bird/Dog    MedX testing: Patient  received neuromuscular education to engage spinal musculature correctly for motor control and engagement of musculature for 10 " minutes including the MedX exercise component and practice and standard testing. MedX dynamic exercise and baseline isometric test performed with instructions to guide the patient safely through the testing procedure. Patient instructed to perform isometric test correctly and safely while building to an optimal force with a pain-free effort. Patient also instructed that they should feel support/pressure from MedX restraints but no pain/discomfort, and encouraged to report any pain to therapist. Patient demonstrated appropriate understanding of information and tolerance of test.  Education regarding purpose of test, safety during test given, and reviewed possible more soreness and strategies.             4/1/2025    11:02 AM   [facility]   Visit Number 11   VAS Pain Rating 0   Time 5   Extension in Lying 10   Extension in Standing 10   Flexion in Lying 10   Lumbar Weight 63 lbs   Repetitions 15   Rating of Perceived Exertion 4   Ice - Z Lie (in min.) 5           Time Entry(in minutes):  Hot/Cold Pack Time Entry: 5  Neuromuscular Re-Education Time Entry: 15  Therapeutic Exercise Time Entry: 40    Assessment & Plan   Assessment: Zion returns to  withoout LBP and ability to perform chores without flare. Pt performed his mobility and stability ex with min cues demonstrating knowledge of HEP. Return to neuro re-ed of spine musculature on Lumbar MedX with progressed resistance to 63ft/lbs completing 15 reps and PRE 4/10. Peripheral machines completed without incidence, Progress as tolerated.  Evaluation/Treatment Tolerance: Patient tolerated treatment well    Patient will continue to benefit from skilled outpatient physical therapy to address the deficits listed in the problem list box on initial evaluation, provide pt/family education and to maximize pt's level of independence in the home and community environment.     Patient's spiritual, cultural, and educational needs considered and patient agreeable to  plan of care and goals.           Plan: Continue with established Plan of Care towards established PT goals.    Goals:   Active       Long term goals: 10 weeks or 20 visits        Pt will demonstrate increased lumbar ROM by at least 6 degrees from initial ROM value, resulting in improved ability to perform functional forward bending while standing and sitting. (Met)       Start:  02/25/25    Expected End:  05/06/25    Resolved:  03/27/25         Pt will demonstrate increased MedX average isometric strength value by 35% from initial test resulting in improved ability to perform bending, lifting, and carrying activities safely and confidently. (Progressing)       Start:  02/25/25    Expected End:  05/06/25            Pt to demonstrate ability to independently control and reduce their pain through posture positioning and mechanical movements throughout a typical day. (Progressing)       Start:  02/25/25    Expected End:  05/06/25            Pt will demonstrate reduced pain and improved functional outcomes as reported on the FOTO by reaching an intake score of >/= 67% functional ability in order to demonstrate subjective improvement in patient's condition. . (Progressing)       Start:  02/25/25    Expected End:  05/06/25            Pt will be able to complete sit to stand transfers without increased pain in the low back (Progressing)       Start:  02/25/25    Expected End:  05/06/25               Long term goals: 10 weeks or 20 visits        Pt will be able to stand up to 30+ min without increase in low back pain (Progressing)       Start:  02/25/25    Expected End:  05/06/25            Pt will demonstrate independence with the HEP at discharge. (Progressing)       Start:  02/25/25    Expected End:  05/06/25               Short term goals:  6 weeks or 10 visits        Pt will demonstrate increased lumbar ROM by at least 3 degrees from the initial ROM value with improvements noted in functional ROM and ability to perform  ADLs. (Met)       Start:  02/25/25    Expected End:  04/08/25    Resolved:  03/27/25         Pt will demonstrate increased MedX average isometric strength value by 20% from initial test resulting in improved ability to perform bending, lifting, and carrying activities safely, confidently. (Progressing)       Start:  02/25/25    Expected End:  04/08/25            Pt will report a reduction in worst pain score by 1-2 points for improved tolerance for morning transfers. (Met)       Start:  02/25/25    Expected End:  04/08/25    Resolved:  03/27/25         Pt able to perform HEP correctly with minimal cueing or supervision from therapist to encourage independent management of symptoms. (Met)       Start:  02/25/25    Expected End:  04/08/25    Resolved:  03/27/25               Leslye Carreon, PTA

## 2025-04-02 ENCOUNTER — PATIENT MESSAGE (OUTPATIENT)
Dept: AUDIOLOGY | Facility: CLINIC | Age: 75
End: 2025-04-02
Payer: MEDICARE

## 2025-04-03 ENCOUNTER — OFFICE VISIT (OUTPATIENT)
Dept: OTOLARYNGOLOGY | Facility: CLINIC | Age: 75
End: 2025-04-03
Payer: MEDICARE

## 2025-04-03 ENCOUNTER — CLINICAL SUPPORT (OUTPATIENT)
Dept: REHABILITATION | Facility: OTHER | Age: 75
End: 2025-04-03
Payer: MEDICARE

## 2025-04-03 ENCOUNTER — DOCUMENTATION ONLY (OUTPATIENT)
Dept: AUDIOLOGY | Facility: CLINIC | Age: 75
End: 2025-04-03
Payer: MEDICARE

## 2025-04-03 DIAGNOSIS — R29.898 DECREASED STRENGTH OF TRUNK AND BACK: ICD-10-CM

## 2025-04-03 DIAGNOSIS — H61.23 BILATERAL IMPACTED CERUMEN: ICD-10-CM

## 2025-04-03 DIAGNOSIS — T16.9XXA: Primary | ICD-10-CM

## 2025-04-03 DIAGNOSIS — R68.89 DECREASED FUNCTIONAL ACTIVITY TOLERANCE: ICD-10-CM

## 2025-04-03 DIAGNOSIS — M53.86 DECREASED ROM OF INTERVERTEBRAL DISCS OF LUMBAR SPINE: Primary | ICD-10-CM

## 2025-04-03 PROCEDURE — 99999 PR PBB SHADOW E&M-EST. PATIENT-LVL II: CPT | Mod: PBBFAC,HCNC,,

## 2025-04-03 PROCEDURE — 97110 THERAPEUTIC EXERCISES: CPT | Mod: HCNC,CQ

## 2025-04-03 PROCEDURE — 97112 NEUROMUSCULAR REEDUCATION: CPT | Mod: HCNC,CQ

## 2025-04-03 NOTE — PROGRESS NOTES
Subjective:   Zion Guillen is a 75 y.o. male who was referred by audiology for foreign bodies in both ear canals. Patient reports his hearing aid domes got stuck in his ears last night. Denies ear pain or drainage.     Past Medical History  He has a past medical history of Arthritis, BCC (basal cell carcinoma of skin), BPH (benign prostatic hyperplasia), Hyperlipidemia, and Hypertension.    Past Surgical History  He has a past surgical history that includes Hernia repair (11/29/2017); Prostate surgery (10/25/2018); Colonoscopy (2006); Knee arthroscopy (Left, 2011); Hernia repair (1962); Cystourethroscopy (05/15/2019); and Transurethral resection of prostate (10/25/2018).    Family History  His family history includes Arthritis in his mother; Cancer in his mother; Coronary artery disease in his father; Heart attack in his father; Hypertension in his father; Uterine cancer in his mother.    Social History  He reports that he quit smoking about 35 years ago. His smoking use included cigarettes. He started smoking about 65 years ago. He has a 30 pack-year smoking history. He has never used smokeless tobacco. He reports current alcohol use of about 6.0 standard drinks of alcohol per week. He reports that he does not use drugs.    Allergies  He has no known allergies.    Medications  He has a current medication list which includes the following prescription(s): albuterol, amlodipine, atorvastatin, azelastine, cholecalciferol (vitamin d3), clotrimazole-betamethasone 1-0.05%, diclofenac sodium, glucosamine-chondroitin, levocetirizine, losartan, methocarbamol, and montelukast.    Review of Systems   HENT: Positive for hearing loss.  Negative for ear discharge, ear infection and ear pain.          Objective:       Head:  Normocephalic and atraumatic.     Ears:    Right Ear: No drainage, swelling or tenderness. Tympanic membrane is not injected, not scarred, not perforated and not erythematous. No middle ear effusion.    Left Ear: No drainage, swelling or tenderness. Tympanic membrane is not injected, not scarred, not perforated and not erythematous.  No middle ear effusion.   Hearing aid domes mixed with wax in bilateral EACs obstructing TM was removed with alligator forceps.     Procedure  Foreign Body Removal: The patient was taken to the treatment room. FB removed with right angle hook and/or forceps.       Assessment:     1. Acute foreign body of ear canal, unspecified laterality, initial encounter    2. Bilateral impacted cerumen      Plan:   Zion was seen today for foreign body in ear.  Diagnoses and all orders for this visit:    Acute foreign body of ear canal, unspecified laterality, initial encounter  Bilateral impacted cerumen  Hearing aid domes and cerumen cleared from both ear canals. Patient tolerated the procedure well with no complications. Follow up as needed.

## 2025-04-03 NOTE — PROGRESS NOTES
Mr. Guillen stopped by clinic today due to suspicion that his hearing aid domes came off in his ears. He reported both of his domes were missing when he took the hearing aids out last night. He noted that he cleaned the hearing aids and put on new domes yesterday afternoon.     Otoscopy revealed domes in both of Mr. Guillen's external auditory canals. He was seen by KIM Bullard PA-C for removal. Mr. Guillen was shown how to properly place domes on his hearing aids. He will return to clinic as needed.       Itemized: 12/26/24    Invoice Date: 9/16/2024  Phonak [id number]  Silver Batres  LT SN: [id number]  RT SN: [id number]   6: M1  Dome: Small Open  Warranty Exp: 10/15/2027    [employer]ger[employer] JADA  SN: [id number]  Warranty Exp: 10/15/2027

## 2025-04-04 NOTE — PROGRESS NOTES
"  Outpatient Rehab    Physical Therapy Visit    Patient Name: Zion Guillen  MRN: [id number]  YOB: 1950  Encounter Date: 4/3/2025    Therapy Diagnosis:   Encounter Diagnoses   Name Primary?    Decreased ROM of intervertebral discs of lumbar spine Yes    Decreased strength of trunk and back     Decreased functional activity tolerance      Physician: Shoshana Richards MD    Physician Orders: Eval and Treat  Medical Diagnosis: Chronic bilateral low back pain without sciatica    Visit # / Visits Authorized:  12/20   Insurance Authorization Period: 2/25/2025 to 4/27/2025  Date of Evaluation: 2/25/2025    Plan of Care Certification: 2/25/2025 to 4/7/2025       PT/PTA: PTA   Number of PTA visits since last PT visit:2  Time In: 0330   Time Out: 0430  Total Time: 60   Total Billable Time: 30    FOTO:  Intake Score:  %  Survey Score 1:  %  Survey Score 2:  %         Subjective   Patient reports overall improvements with therapy..  Pain reported as 1/10.      Objective        Treatment:  Therapeutic Exercise  TE 1: Stationary Bike 5 min  TE 2: Lower Trunk Rotation x10  TE 3: Double Knee to Chest x10  TE 4: Extension in Lying x10     and EIS x10  TE 5: Sidelying Open book stretch x10 - Side lying Clams x15 GTB  TE 6: Prone hip extension x15-np  TE 7: PPT x10  TE 8: Bridges x15 GTB  TE 9: Pirifomris stretch 30"  TE 10: Peripheral muscle strengthening which included one set of 15-20 repetitions focused on strengthening supporting musculature in order to improve body mechanics and functional mobility. Targeted muscle group machines:Torso rotation, Leg Ext, Leg Curl, Chest Press, Rowing  Triceps, Biceps, Hip Abd, Hip Add, and Leg Press  Balance/Neuromuscular Re-Education  NMR 1: Lumbar Medx  NMR 2: Paloff press GTB x15-np  NMR 3: Bird/Dog-np      Medical MedX Treatment as follows:  Patient received neuromuscular education for 10 minutes via participation on the Medical MedX Machine. Therapist assisted patient in " isolating and engaging spinal stabilization musculature in order to improve functional ability and postural control. Patient performed exercise with therapist guidance in order to accurately use pacer function, avoid valsalva, and optimally exert effort within a safe and effective range via the Fransico Exertion Rating Scale. Patient instructed to perform at a midrange of exertion and to complete 15-20 repetitions within appropriate split time, with proper technique, and while maintaining safety.           4/3/2025    10:57 AM   [facility]   Visit Number 12   VAS Pain Rating 0   Time 5   Extension in Lying 10   Flexion in Lying 10   Lumbar Weight 63 lbs   Repetitions 18   Rating of Perceived Exertion 4   Ice - Sitting 5 mins.         Time Entry(in minutes):  Neuromuscular Re-Education Time Entry: 10  Therapeutic Exercise Time Entry: 20    Assessment & Plan   Assessment: Zion returns to HB session with reports of overall improvements. Pt resumed therex to promote mobility and stability for lumbar support, requiring min cues for technique. Lumbar MedX completed with resistance of  63ft/lbs, completing 18 reps and PRE 5/10. Peripheral machines completed without incidence, Progress as tolerated.  Evaluation/Treatment Tolerance: Patient tolerated treatment well    Patient will continue to benefit from skilled outpatient physical therapy to address the deficits listed in the problem list box on initial evaluation, provide pt/family education and to maximize pt's level of independence in the home and community environment.     Patient's spiritual, cultural, and educational needs considered and patient agreeable to plan of care and goals.           Plan: Continue with established Plan of Care towards established PT goals.    Goals:   Active       Long term goals: 10 weeks or 20 visits        Pt will demonstrate increased lumbar ROM by at least 6 degrees from initial ROM value, resulting in improved ability to  perform functional forward bending while standing and sitting. (Met)       Start:  02/25/25    Expected End:  05/06/25    Resolved:  03/27/25         Pt will demonstrate increased MedX average isometric strength value by 35% from initial test resulting in improved ability to perform bending, lifting, and carrying activities safely and confidently. (Progressing)       Start:  02/25/25    Expected End:  05/06/25            Pt to demonstrate ability to independently control and reduce their pain through posture positioning and mechanical movements throughout a typical day. (Progressing)       Start:  02/25/25    Expected End:  05/06/25            Pt will demonstrate reduced pain and improved functional outcomes as reported on the FOTO by reaching an intake score of >/= 67% functional ability in order to demonstrate subjective improvement in patient's condition. . (Progressing)       Start:  02/25/25    Expected End:  05/06/25            Pt will be able to complete sit to stand transfers without increased pain in the low back (Progressing)       Start:  02/25/25    Expected End:  05/06/25               Long term goals: 10 weeks or 20 visits        Pt will be able to stand up to 30+ min without increase in low back pain (Progressing)       Start:  02/25/25    Expected End:  05/06/25            Pt will demonstrate independence with the HEP at discharge. (Progressing)       Start:  02/25/25    Expected End:  05/06/25               Short term goals:  6 weeks or 10 visits        Pt will demonstrate increased lumbar ROM by at least 3 degrees from the initial ROM value with improvements noted in functional ROM and ability to perform ADLs. (Met)       Start:  02/25/25    Expected End:  04/08/25    Resolved:  03/27/25         Pt will demonstrate increased MedX average isometric strength value by 20% from initial test resulting in improved ability to perform bending, lifting, and carrying activities safely, confidently.  (Progressing)       Start:  02/25/25    Expected End:  04/08/25            Pt will report a reduction in worst pain score by 1-2 points for improved tolerance for morning transfers. (Met)       Start:  02/25/25    Expected End:  04/08/25    Resolved:  03/27/25         Pt able to perform HEP correctly with minimal cueing or supervision from therapist to encourage independent management of symptoms. (Met)       Start:  02/25/25    Expected End:  04/08/25    Resolved:  03/27/25             Leida Arriola PTA

## 2025-04-20 DIAGNOSIS — I10 BENIGN HYPERTENSION: ICD-10-CM

## 2025-04-20 NOTE — TELEPHONE ENCOUNTER
No care due was identified.  Health [facility] Embedded Care Due Messages. Reference number: [id number].   4/20/2025 9:07:11 AM CDT

## 2025-04-22 RX ORDER — AMLODIPINE BESYLATE 10 MG/1
10 TABLET ORAL DAILY
Qty: 90 TABLET | Refills: 3 | Status: SHIPPED | OUTPATIENT
Start: 2025-04-22

## 2025-04-25 ENCOUNTER — TELEPHONE (OUTPATIENT)
Dept: OPTOMETRY | Facility: CLINIC | Age: 75
End: 2025-04-25
Payer: MEDICARE

## 2025-04-25 NOTE — TELEPHONE ENCOUNTER
----- Message from [facility]SIC sent at 4/25/2025  1:50 PM CDT -----  Regarding: Consult/Advisory  Contact: Zion Guillen   Consult/Advisory Name Of Caller:Zion Guillen   Contact Preference:[phone] (home)   Nature of call:Patient is calling to get rechecked due to he's see blurry and can't see out of new script. Requesting a call back

## 2025-04-29 ENCOUNTER — OFFICE VISIT (OUTPATIENT)
Dept: OPTOMETRY | Facility: CLINIC | Age: 75
End: 2025-04-29
Payer: MEDICARE

## 2025-04-29 DIAGNOSIS — H52.13 MYOPIA WITH ASTIGMATISM AND PRESBYOPIA, BILATERAL: ICD-10-CM

## 2025-04-29 DIAGNOSIS — H25.13 NUCLEAR SCLEROSIS OF BOTH EYES: ICD-10-CM

## 2025-04-29 DIAGNOSIS — H53.10 SUBJECTIVE VISUAL DISTURBANCE: Primary | ICD-10-CM

## 2025-04-29 DIAGNOSIS — I70.0 AORTIC ATHEROSCLEROSIS: ICD-10-CM

## 2025-04-29 DIAGNOSIS — H52.4 MYOPIA WITH ASTIGMATISM AND PRESBYOPIA, BILATERAL: ICD-10-CM

## 2025-04-29 DIAGNOSIS — H52.203 MYOPIA WITH ASTIGMATISM AND PRESBYOPIA, BILATERAL: ICD-10-CM

## 2025-04-29 PROCEDURE — 99999 PR PBB SHADOW E&M-EST. PATIENT-LVL II: CPT | Mod: PBBFAC,HCNC,, | Performed by: OPTOMETRIST

## 2025-04-29 PROCEDURE — 99499 UNLISTED E&M SERVICE: CPT | Mod: HCNC,S$GLB,, | Performed by: OPTOMETRIST

## 2025-04-29 RX ORDER — ATORVASTATIN CALCIUM 40 MG/1
40 TABLET, FILM COATED ORAL DAILY
Qty: 90 TABLET | Refills: 2 | Status: SHIPPED | OUTPATIENT
Start: 2025-04-29

## 2025-04-29 NOTE — PROGRESS NOTES
HPI    PAL. New pair with 2025 Rx from 3 weeks ago. Distance is still blurry, but   reading is good. Street signs are hard to see.     Currently wearing 2024 pair and feels as if they are better.     Last edited by Giselle Rayo, OD on 4/29/2025 11:26 AM.        ROS    Negative for: Constitutional, Gastrointestinal, Neurological, Skin,   Genitourinary, Musculoskeletal, HENT, Endocrine, Cardiovascular, Eyes,   Respiratory, Psychiatric, Allergic/Imm, Heme/Lymph  Last edited by Giselle Rayo, OD on 4/29/2025 10:53 AM.        Assessment /Plan     For exam results, see Encounter Report.    Subjective visual disturbance    Myopia with astigmatism and presbyopia, bilateral    Nuclear sclerosis of both eyes    PT ED ON EXAM FINDINGS. MONITOR.   SPECS; UPDATED AND FINALIZED SPEC RX FOR FTW.   BCVA OD 20/25-3 OS 20/25-  ADVISED TO USE ARTIFICIAL TEARS TID-QID FOR VISION FLUCTUATION AND TO STABILIZE FRONT OCULAR SURFACE.   IF STILL NO IMPROVEMENT WITH UPDATED GLASSES AND DAILY LIFE IMPACTED, CAN CONSIDER SENDING BACK TO DR. NEW FOR CATARACT SURGERY   RTC 1 YEAR CEE/DFE, SOONER PRN.

## 2025-04-29 NOTE — TELEPHONE ENCOUNTER
No care due was identified.  [facility] Embedded Care Due Messages. Reference number: [id number].   4/29/2025 3:37:18 PM CDT

## 2025-05-04 DIAGNOSIS — M17.0 BILATERAL PRIMARY OSTEOARTHRITIS OF KNEE: ICD-10-CM

## 2025-05-05 RX ORDER — DICLOFENAC SODIUM 10 MG/G
2 GEL TOPICAL 4 TIMES DAILY PRN
Qty: 100 G | Refills: 3 | Status: SHIPPED | OUTPATIENT
Start: 2025-05-05

## 2025-05-05 NOTE — TELEPHONE ENCOUNTER
Refill Encounter    PCP Visits: Recent Visits  Date Type Provider Dept   02/18/25 Office Visit Shoshana Richards MD [facility]   08/27/24 Office Visit Shoshana Richards MD [facility]   Showing recent visits within past 360 days and meeting all other requirements  Future Appointments  Date Type Provider Dept   08/19/25 Appointment Shoshana Richards MD [facility]   Showing future appointments within next 720 days and meeting all other requirements     Last 3 Blood Pressure:   BP Readings from Last 3 Encounters:   02/18/25 112/60   01/16/25 110/70   12/10/24 116/71     Preferred Pharmacy:   [facility]/pharmacy #[id number] - [place], [place] - [address]  [address] [address]  Phone: [phone] Fax: [phone]    Requested RX:  Requested Prescriptions     Pending Prescriptions Disp Refills    diclofenac sodium (VOLTAREN) 1 % Gel 100 g 3     Sig: Apply 2 g topically 4 (four) times daily as needed (pain).      RX Route: Normal

## 2025-05-13 ENCOUNTER — CLINICAL SUPPORT (OUTPATIENT)
Dept: AUDIOLOGY | Facility: CLINIC | Age: 75
End: 2025-05-13
Payer: MEDICARE

## 2025-05-13 DIAGNOSIS — H90.3 SENSORINEURAL HEARING LOSS, BILATERAL: Primary | ICD-10-CM

## 2025-05-13 NOTE — PROGRESS NOTES
Hearing Aid Follow-up:    Mr. Guillen was seen today for a hearing aid follow-up appointment. He reported he is doing very well wit his hearing aids, and feels he hears noticeably better in restaurants. Mr. Guillen's hearing aids were cleaned and checked. A listening check confirmed both hearing aids to be in good working condition following cleaning. Mr. Guillen declined additional programming adjustments. He will return for his annual audiogram and hearing aid follow-up or sooner if needed.       Itemized: 12/26/24    Invoice Date: 9/16/2024  Phonak [id number]  Silver Batres  LT SN: [id number]  RT SN: [id number]   6: M1  Dome: Small Open  Warranty Exp: 10/15/2027    Karoline ELIAS  SN: [id number]  Warranty Exp: 10/15/2027

## 2025-05-30 DIAGNOSIS — Z91.09 ENVIRONMENTAL ALLERGIES: ICD-10-CM

## 2025-05-30 RX ORDER — MONTELUKAST SODIUM 10 MG/1
TABLET ORAL
Qty: 90 TABLET | Refills: 2 | Status: SHIPPED | OUTPATIENT
Start: 2025-05-30

## 2025-05-30 NOTE — TELEPHONE ENCOUNTER
Care Due:                  Date            Visit Type   Department     Provider  --------------------------------------------------------------------------------                                EP -                              PRIMARY      [facility] INTERNAL  Last Visit: 02-      CARE ([facility])   MCKENNA Richards                              EP -                              PRIMARY      [facility] INTERNAL  Next Visit: 08-      CARE ([facility])   MCKENNA Richards                                                            Last  Test          Frequency    Reason                     Performed    Due Date  --------------------------------------------------------------------------------    CBC.........  12 months..  diclofenac...............  08- 08-    CMP.........  12 months..  atorvastatin, diclofenac.  08- 08-    Lipid Panel.  12 months..  atorvastatin.............  08- 08-    Health [facility] Embedded Care Due Messages. Reference number: [id number].   5/30/2025 12:05:23 AM CDT

## 2025-05-30 NOTE — TELEPHONE ENCOUNTER
Refill Decision Note   Zion Guillen  is requesting a refill authorization.  Brief Assessment and Rationale for Refill:  Approve     Medication Therapy Plan:  FLOS      Comments:     Note composed:7:34 AM 05/30/2025

## 2025-06-03 DIAGNOSIS — Z91.09 ENVIRONMENTAL ALLERGIES: ICD-10-CM

## 2025-06-03 RX ORDER — LEVOCETIRIZINE DIHYDROCHLORIDE 5 MG/1
5 TABLET, FILM COATED ORAL NIGHTLY
Qty: 90 TABLET | Refills: 3 | Status: CANCELLED | OUTPATIENT
Start: 2025-06-03

## 2025-07-19 DIAGNOSIS — M17.0 BILATERAL PRIMARY OSTEOARTHRITIS OF KNEE: ICD-10-CM

## 2025-07-19 DIAGNOSIS — I10 BENIGN HYPERTENSION: ICD-10-CM

## 2025-07-19 NOTE — TELEPHONE ENCOUNTER
No care due was identified.  [facility] Embedded Care Due Messages. Reference number: [id number].   7/19/2025 10:53:55 AM CDT

## 2025-07-19 NOTE — TELEPHONE ENCOUNTER
No care due was identified.  Health [facility] Embedded Care Due Messages. Reference number: [id number].   7/19/2025 10:54:17 AM CDT

## 2025-07-21 RX ORDER — DICLOFENAC SODIUM 10 MG/G
2 GEL TOPICAL 4 TIMES DAILY PRN
Qty: 100 G | Refills: 3 | Status: SHIPPED | OUTPATIENT
Start: 2025-07-21

## 2025-07-21 RX ORDER — AMLODIPINE BESYLATE 10 MG/1
10 TABLET ORAL DAILY
Qty: 90 TABLET | Refills: 3 | Status: SHIPPED | OUTPATIENT
Start: 2025-07-21

## 2025-08-14 ENCOUNTER — LAB VISIT (OUTPATIENT)
Dept: LAB | Facility: OTHER | Age: 75
End: 2025-08-14
Attending: STUDENT IN AN ORGANIZED HEALTH CARE EDUCATION/TRAINING PROGRAM
Payer: MEDICARE

## 2025-08-14 DIAGNOSIS — I10 ESSENTIAL HYPERTENSION: ICD-10-CM

## 2025-08-14 DIAGNOSIS — E55.9 VITAMIN D DEFICIENCY: ICD-10-CM

## 2025-08-14 DIAGNOSIS — Z00.00 HEALTH MAINTENANCE EXAMINATION: ICD-10-CM

## 2025-08-14 DIAGNOSIS — E78.2 MIXED HYPERLIPIDEMIA: ICD-10-CM

## 2025-08-14 DIAGNOSIS — R73.09 OTHER ABNORMAL GLUCOSE: ICD-10-CM

## 2025-08-14 LAB
25(OH)D3+25(OH)D2 SERPL-MCNC: 51 NG/ML (ref 30–96)
ABSOLUTE EOSINOPHIL (OHS): 0.16 K/UL
ABSOLUTE MONOCYTE (OHS): 0.4 K/UL (ref 0.3–1)
ABSOLUTE NEUTROPHIL COUNT (OHS): 2.37 K/UL (ref 1.8–7.7)
ALBUMIN SERPL BCP-MCNC: 4.4 G/DL (ref 3.5–5.2)
ALBUMIN/CREAT UR: 8.6 UG/MG
ALP SERPL-CCNC: 57 UNIT/L (ref 40–150)
ALT SERPL W/O P-5'-P-CCNC: 21 UNIT/L (ref 10–44)
ANION GAP (OHS): 6 MMOL/L (ref 8–16)
AST SERPL-CCNC: 31 UNIT/L (ref 11–45)
BASOPHILS # BLD AUTO: 0.03 K/UL
BASOPHILS NFR BLD AUTO: 0.7 %
BILIRUB SERPL-MCNC: 1 MG/DL (ref 0.1–1)
BUN SERPL-MCNC: 8 MG/DL (ref 8–23)
CALCIUM SERPL-MCNC: 9.4 MG/DL (ref 8.7–10.5)
CHLORIDE SERPL-SCNC: 108 MMOL/L (ref 95–110)
CHOLEST SERPL-MCNC: 110 MG/DL (ref 120–199)
CHOLEST/HDLC SERPL: 2.3 {RATIO} (ref 2–5)
CO2 SERPL-SCNC: 29 MMOL/L (ref 23–29)
CREAT SERPL-MCNC: 0.8 MG/DL (ref 0.5–1.4)
CREAT UR-MCNC: 116 MG/DL (ref 23–375)
EAG (OHS): 103 MG/DL (ref 68–131)
ERYTHROCYTE [DISTWIDTH] IN BLOOD BY AUTOMATED COUNT: 12.5 % (ref 11.5–14.5)
GFR SERPLBLD CREATININE-BSD FMLA CKD-EPI: >60 ML/MIN/1.73/M2
GLUCOSE SERPL-MCNC: 89 MG/DL (ref 70–110)
HBA1C MFR BLD: 5.2 % (ref 4–5.6)
HCT VFR BLD AUTO: 45.1 % (ref 40–54)
HDLC SERPL-MCNC: 48 MG/DL (ref 40–75)
HDLC SERPL: 43.6 % (ref 20–50)
HGB BLD-MCNC: 15 GM/DL (ref 14–18)
IMM GRANULOCYTES # BLD AUTO: 0.01 K/UL (ref 0–0.04)
IMM GRANULOCYTES NFR BLD AUTO: 0.2 % (ref 0–0.5)
LDLC SERPL CALC-MCNC: 48.6 MG/DL (ref 63–159)
LYMPHOCYTES # BLD AUTO: 1.26 K/UL (ref 1–4.8)
MCH RBC QN AUTO: 32.2 PG (ref 27–31)
MCHC RBC AUTO-ENTMCNC: 33.3 G/DL (ref 32–36)
MCV RBC AUTO: 97 FL (ref 82–98)
MICROALBUMIN UR-MCNC: 10 UG/ML (ref ?–5000)
NONHDLC SERPL-MCNC: 62 MG/DL
NUCLEATED RBC (/100WBC) (OHS): 0 /100 WBC
PLATELET # BLD AUTO: 189 K/UL (ref 150–450)
PMV BLD AUTO: 10.6 FL (ref 9.2–12.9)
POTASSIUM SERPL-SCNC: 4.6 MMOL/L (ref 3.5–5.1)
PROT SERPL-MCNC: 7 GM/DL (ref 6–8.4)
RBC # BLD AUTO: 4.66 M/UL (ref 4.6–6.2)
RELATIVE EOSINOPHIL (OHS): 3.8 %
RELATIVE LYMPHOCYTE (OHS): 29.8 % (ref 18–48)
RELATIVE MONOCYTE (OHS): 9.5 % (ref 4–15)
RELATIVE NEUTROPHIL (OHS): 56 % (ref 38–73)
SODIUM SERPL-SCNC: 143 MMOL/L (ref 136–145)
TRIGL SERPL-MCNC: 67 MG/DL (ref 30–150)
TSH SERPL-ACNC: 2.73 UIU/ML (ref 0.4–4)
WBC # BLD AUTO: 4.23 K/UL (ref 3.9–12.7)

## 2025-08-14 PROCEDURE — 84443 ASSAY THYROID STIM HORMONE: CPT | Mod: HCNC

## 2025-08-14 PROCEDURE — 83036 HEMOGLOBIN GLYCOSYLATED A1C: CPT | Mod: HCNC

## 2025-08-14 PROCEDURE — 82465 ASSAY BLD/SERUM CHOLESTEROL: CPT | Mod: HCNC

## 2025-08-14 PROCEDURE — 85025 COMPLETE CBC W/AUTO DIFF WBC: CPT | Mod: HCNC

## 2025-08-14 PROCEDURE — 36415 COLL VENOUS BLD VENIPUNCTURE: CPT | Mod: HCNC

## 2025-08-14 PROCEDURE — 80051 ELECTROLYTE PANEL: CPT | Mod: HCNC

## 2025-08-14 PROCEDURE — 82306 VITAMIN D 25 HYDROXY: CPT | Mod: HCNC

## 2025-08-14 PROCEDURE — 82570 ASSAY OF URINE CREATININE: CPT | Mod: HCNC

## 2025-08-25 DIAGNOSIS — I10 ESSENTIAL HYPERTENSION: ICD-10-CM

## 2025-08-25 RX ORDER — LOSARTAN POTASSIUM 25 MG/1
25 TABLET ORAL DAILY
Qty: 90 TABLET | Refills: 2 | Status: SHIPPED | OUTPATIENT
Start: 2025-08-25

## 2025-08-30 DIAGNOSIS — I10 ESSENTIAL HYPERTENSION: ICD-10-CM

## 2025-08-30 RX ORDER — LOSARTAN POTASSIUM 25 MG/1
25 TABLET ORAL DAILY
Qty: 90 TABLET | Refills: 2 | Status: CANCELLED | OUTPATIENT
Start: 2025-08-30